# Patient Record
Sex: FEMALE | Race: WHITE | NOT HISPANIC OR LATINO | URBAN - METROPOLITAN AREA
[De-identification: names, ages, dates, MRNs, and addresses within clinical notes are randomized per-mention and may not be internally consistent; named-entity substitution may affect disease eponyms.]

---

## 2017-03-01 ENCOUNTER — EMERGENCY (EMERGENCY)
Facility: HOSPITAL | Age: 82
LOS: 1 days | End: 2017-03-01
Admitting: EMERGENCY MEDICINE
Payer: MEDICARE

## 2017-03-01 PROCEDURE — 72125 CT NECK SPINE W/O DYE: CPT | Mod: 26

## 2017-03-01 PROCEDURE — 12011 RPR F/E/E/N/L/M 2.5 CM/<: CPT

## 2017-03-01 PROCEDURE — 99284 EMERGENCY DEPT VISIT MOD MDM: CPT | Mod: 25

## 2017-03-01 PROCEDURE — 70450 CT HEAD/BRAIN W/O DYE: CPT | Mod: 26

## 2017-03-01 PROCEDURE — 96372 THER/PROPH/DIAG INJ SC/IM: CPT

## 2017-03-01 PROCEDURE — 72125 CT NECK SPINE W/O DYE: CPT

## 2017-03-01 PROCEDURE — 90471 IMMUNIZATION ADMIN: CPT

## 2017-03-01 PROCEDURE — 70450 CT HEAD/BRAIN W/O DYE: CPT

## 2017-03-01 PROCEDURE — 90715 TDAP VACCINE 7 YRS/> IM: CPT

## 2017-03-01 PROCEDURE — 70486 CT MAXILLOFACIAL W/O DYE: CPT

## 2017-03-01 PROCEDURE — 70486 CT MAXILLOFACIAL W/O DYE: CPT | Mod: 26

## 2017-06-25 ENCOUNTER — INPATIENT (INPATIENT)
Facility: HOSPITAL | Age: 82
LOS: 5 days | Discharge: HOME HEALTH | DRG: 641 | End: 2017-07-01
Attending: FAMILY MEDICINE | Admitting: FAMILY MEDICINE
Payer: MEDICARE

## 2017-06-25 VITALS
RESPIRATION RATE: 16 BRPM | WEIGHT: 134.92 LBS | HEART RATE: 72 BPM | HEIGHT: 62 IN | TEMPERATURE: 98 F | OXYGEN SATURATION: 94 % | SYSTOLIC BLOOD PRESSURE: 118 MMHG | DIASTOLIC BLOOD PRESSURE: 73 MMHG

## 2017-06-25 LAB
ALBUMIN SERPL ELPH-MCNC: 3 G/DL — LOW (ref 3.3–5)
ALP SERPL-CCNC: 96 U/L — SIGNIFICANT CHANGE UP (ref 40–120)
ALT FLD-CCNC: 19 U/L — SIGNIFICANT CHANGE UP (ref 12–78)
ANION GAP SERPL CALC-SCNC: 10 MMOL/L — SIGNIFICANT CHANGE UP (ref 5–17)
APTT BLD: 26.6 SEC — LOW (ref 27.5–37.4)
AST SERPL-CCNC: 28 U/L — SIGNIFICANT CHANGE UP (ref 15–37)
BASOPHILS # BLD AUTO: 0.1 K/UL — SIGNIFICANT CHANGE UP (ref 0–0.2)
BASOPHILS NFR BLD AUTO: 0.6 % — SIGNIFICANT CHANGE UP (ref 0–2)
BILIRUB SERPL-MCNC: 0.8 MG/DL — SIGNIFICANT CHANGE UP (ref 0.2–1.2)
BUN SERPL-MCNC: 10 MG/DL — SIGNIFICANT CHANGE UP (ref 7–23)
CALCIUM SERPL-MCNC: 8.2 MG/DL — LOW (ref 8.5–10.1)
CHLORIDE SERPL-SCNC: 83 MMOL/L — LOW (ref 96–108)
CK MB BLD-MCNC: 3.2 % — SIGNIFICANT CHANGE UP (ref 0–3.5)
CK MB CFR SERPL CALC: 9.1 NG/ML — HIGH (ref 0–3.6)
CK SERPL-CCNC: 284 U/L — HIGH (ref 26–192)
CO2 SERPL-SCNC: 28 MMOL/L — SIGNIFICANT CHANGE UP (ref 22–31)
CREAT SERPL-MCNC: 0.64 MG/DL — SIGNIFICANT CHANGE UP (ref 0.5–1.3)
EOSINOPHIL # BLD AUTO: 0.1 K/UL — SIGNIFICANT CHANGE UP (ref 0–0.5)
EOSINOPHIL NFR BLD AUTO: 0.5 % — SIGNIFICANT CHANGE UP (ref 0–6)
GLUCOSE SERPL-MCNC: 124 MG/DL — HIGH (ref 70–99)
HCT VFR BLD CALC: 36.5 % — SIGNIFICANT CHANGE UP (ref 34.5–45)
HGB BLD-MCNC: 12.8 G/DL — SIGNIFICANT CHANGE UP (ref 11.5–15.5)
INR BLD: 1.07 RATIO — SIGNIFICANT CHANGE UP (ref 0.88–1.16)
LYMPHOCYTES # BLD AUTO: 1.4 K/UL — SIGNIFICANT CHANGE UP (ref 1–3.3)
LYMPHOCYTES # BLD AUTO: 11.1 % — LOW (ref 13–44)
MCHC RBC-ENTMCNC: 32.3 PG — SIGNIFICANT CHANGE UP (ref 27–34)
MCHC RBC-ENTMCNC: 35.1 GM/DL — SIGNIFICANT CHANGE UP (ref 32–36)
MCV RBC AUTO: 92 FL — SIGNIFICANT CHANGE UP (ref 80–100)
MONOCYTES # BLD AUTO: 0.9 K/UL — SIGNIFICANT CHANGE UP (ref 0–0.9)
MONOCYTES NFR BLD AUTO: 6.9 % — SIGNIFICANT CHANGE UP (ref 1–9)
NEUTROPHILS # BLD AUTO: 10.5 K/UL — HIGH (ref 1.8–7.4)
NEUTROPHILS NFR BLD AUTO: 80.8 % — HIGH (ref 43–77)
PLATELET # BLD AUTO: 201 K/UL — SIGNIFICANT CHANGE UP (ref 150–400)
POTASSIUM SERPL-MCNC: 2.7 MMOL/L — CRITICAL LOW (ref 3.5–5.3)
POTASSIUM SERPL-SCNC: 2.7 MMOL/L — CRITICAL LOW (ref 3.5–5.3)
PROT SERPL-MCNC: 6.6 G/DL — SIGNIFICANT CHANGE UP (ref 6–8.3)
PROTHROM AB SERPL-ACNC: 11.7 SEC — SIGNIFICANT CHANGE UP (ref 9.8–12.7)
RBC # BLD: 3.96 M/UL — SIGNIFICANT CHANGE UP (ref 3.8–5.2)
RBC # FLD: 12 % — SIGNIFICANT CHANGE UP (ref 10.3–14.5)
SODIUM SERPL-SCNC: 121 MMOL/L — LOW (ref 135–145)
TROPONIN I SERPL-MCNC: 0.45 NG/ML — HIGH (ref 0.01–0.04)
WBC # BLD: 13 K/UL — HIGH (ref 3.8–10.5)
WBC # FLD AUTO: 13 K/UL — HIGH (ref 3.8–10.5)

## 2017-06-25 PROCEDURE — 93010 ELECTROCARDIOGRAM REPORT: CPT

## 2017-06-25 RX ORDER — CEFTRIAXONE 500 MG/1
1 INJECTION, POWDER, FOR SOLUTION INTRAMUSCULAR; INTRAVENOUS ONCE
Qty: 0 | Refills: 0 | Status: COMPLETED | OUTPATIENT
Start: 2017-06-25 | End: 2017-06-25

## 2017-06-25 RX ORDER — AMLODIPINE BESYLATE 2.5 MG/1
5 TABLET ORAL DAILY
Qty: 0 | Refills: 0 | Status: DISCONTINUED | OUTPATIENT
Start: 2017-06-25 | End: 2017-06-26

## 2017-06-25 RX ORDER — ACETAMINOPHEN 500 MG
650 TABLET ORAL EVERY 6 HOURS
Qty: 0 | Refills: 0 | Status: DISCONTINUED | OUTPATIENT
Start: 2017-06-25 | End: 2017-07-01

## 2017-06-25 RX ORDER — METOPROLOL TARTRATE 50 MG
25 TABLET ORAL DAILY
Qty: 0 | Refills: 0 | Status: DISCONTINUED | OUTPATIENT
Start: 2017-06-25 | End: 2017-07-01

## 2017-06-25 RX ORDER — LOSARTAN POTASSIUM 100 MG/1
25 TABLET, FILM COATED ORAL DAILY
Qty: 0 | Refills: 0 | Status: DISCONTINUED | OUTPATIENT
Start: 2017-06-25 | End: 2017-06-26

## 2017-06-25 RX ORDER — SENNA PLUS 8.6 MG/1
2 TABLET ORAL AT BEDTIME
Qty: 0 | Refills: 0 | Status: DISCONTINUED | OUTPATIENT
Start: 2017-06-25 | End: 2017-07-01

## 2017-06-25 RX ORDER — POTASSIUM CHLORIDE 20 MEQ
40 PACKET (EA) ORAL ONCE
Qty: 0 | Refills: 0 | Status: DISCONTINUED | OUTPATIENT
Start: 2017-06-25 | End: 2017-06-25

## 2017-06-25 RX ORDER — POTASSIUM CHLORIDE 20 MEQ
10 PACKET (EA) ORAL
Qty: 0 | Refills: 0 | Status: COMPLETED | OUTPATIENT
Start: 2017-06-25 | End: 2017-06-26

## 2017-06-25 RX ORDER — MIRTAZAPINE 45 MG/1
15 TABLET, ORALLY DISINTEGRATING ORAL AT BEDTIME
Qty: 0 | Refills: 0 | Status: DISCONTINUED | OUTPATIENT
Start: 2017-06-25 | End: 2017-07-01

## 2017-06-25 RX ORDER — SODIUM CHLORIDE 9 MG/ML
1000 INJECTION INTRAMUSCULAR; INTRAVENOUS; SUBCUTANEOUS
Qty: 0 | Refills: 0 | Status: DISCONTINUED | OUTPATIENT
Start: 2017-06-25 | End: 2017-06-26

## 2017-06-25 RX ORDER — AZITHROMYCIN 500 MG/1
500 TABLET, FILM COATED ORAL ONCE
Qty: 0 | Refills: 0 | Status: COMPLETED | OUTPATIENT
Start: 2017-06-25 | End: 2017-06-26

## 2017-06-25 RX ORDER — FOLIC ACID 0.8 MG
1 TABLET ORAL DAILY
Qty: 0 | Refills: 0 | Status: DISCONTINUED | OUTPATIENT
Start: 2017-06-25 | End: 2017-07-01

## 2017-06-25 RX ORDER — CEFTRIAXONE 500 MG/1
1 INJECTION, POWDER, FOR SOLUTION INTRAMUSCULAR; INTRAVENOUS EVERY 24 HOURS
Qty: 0 | Refills: 0 | Status: DISCONTINUED | OUTPATIENT
Start: 2017-06-26 | End: 2017-06-26

## 2017-06-25 RX ORDER — DIVALPROEX SODIUM 500 MG/1
125 TABLET, DELAYED RELEASE ORAL
Qty: 0 | Refills: 0 | Status: DISCONTINUED | OUTPATIENT
Start: 2017-06-25 | End: 2017-07-01

## 2017-06-25 RX ORDER — POTASSIUM CHLORIDE 20 MEQ
40 PACKET (EA) ORAL ONCE
Qty: 0 | Refills: 0 | Status: COMPLETED | OUTPATIENT
Start: 2017-06-25 | End: 2017-06-25

## 2017-06-25 RX ORDER — AZITHROMYCIN 500 MG/1
500 TABLET, FILM COATED ORAL EVERY 24 HOURS
Qty: 0 | Refills: 0 | Status: DISCONTINUED | OUTPATIENT
Start: 2017-06-26 | End: 2017-06-26

## 2017-06-25 RX ORDER — ENOXAPARIN SODIUM 100 MG/ML
40 INJECTION SUBCUTANEOUS DAILY
Qty: 0 | Refills: 0 | Status: DISCONTINUED | OUTPATIENT
Start: 2017-06-25 | End: 2017-07-01

## 2017-06-25 RX ORDER — LEVOTHYROXINE SODIUM 125 MCG
88 TABLET ORAL DAILY
Qty: 0 | Refills: 0 | Status: DISCONTINUED | OUTPATIENT
Start: 2017-06-25 | End: 2017-07-01

## 2017-06-25 RX ORDER — ENOXAPARIN SODIUM 100 MG/ML
60 INJECTION SUBCUTANEOUS ONCE
Qty: 0 | Refills: 0 | Status: DISCONTINUED | OUTPATIENT
Start: 2017-06-25 | End: 2017-06-27

## 2017-06-25 RX ORDER — ACETAMINOPHEN 500 MG
650 TABLET ORAL EVERY 6 HOURS
Qty: 0 | Refills: 0 | Status: DISCONTINUED | OUTPATIENT
Start: 2017-06-25 | End: 2017-06-28

## 2017-06-25 RX ORDER — CLOPIDOGREL BISULFATE 75 MG/1
75 TABLET, FILM COATED ORAL ONCE
Qty: 0 | Refills: 0 | Status: COMPLETED | OUTPATIENT
Start: 2017-06-25 | End: 2017-06-25

## 2017-06-25 RX ADMIN — CLOPIDOGREL BISULFATE 75 MILLIGRAM(S): 75 TABLET, FILM COATED ORAL at 23:21

## 2017-06-25 RX ADMIN — Medication 40 MILLIEQUIVALENT(S): at 23:53

## 2017-06-25 RX ADMIN — Medication 100 MILLIEQUIVALENT(S): at 23:21

## 2017-06-25 NOTE — ED PROVIDER NOTE - MEDICAL DECISION MAKING DETAILS
Pt is a 86 yo female sent from assisted living dementia unit for reported hemiplegia, resolved by time ems arrived. neuro exam wnl, check hct, ekg, labs, cxr, ce's.  neuro consult

## 2017-06-25 NOTE — ED ADULT NURSE NOTE - CHIEF COMPLAINT QUOTE
as per EMS facility member stated patient was found to have one sided weakness (unspecified what side) and that the weakness resolved. patients mental baseline is alert and oriented X2.   clear speech noted. patient is alert and oriented X2   as per EMS as per EMS facility member stated patient was found to have one sided weakness (unspecified what side) and that the weakness resolved. patients mental baseline is alert and oriented X2.   clear speech noted. patient is alert and oriented X2   as per EMS.  unknown start time of one sided weakness as per EMS.

## 2017-06-25 NOTE — ED PROVIDER NOTE - NS ED ROS FT
staff at assisted living called ems for hemiplegia, side unknown. pt states felt " faint"   denies any other compliant

## 2017-06-25 NOTE — ED ADULT NURSE REASSESSMENT NOTE - COMFORT CARE
side rails up/plan of care explained/warm blanket provided/assisted to bathroom/po fluids offered/repositioned

## 2017-06-25 NOTE — ED PROVIDER NOTE - CARE PLAN
Principal Discharge DX:	Transient cerebral ischemia, unspecified type  Secondary Diagnosis:	NSTEMI (non-ST elevation myocardial infarction)

## 2017-06-25 NOTE — ED ADULT TRIAGE NOTE - CHIEF COMPLAINT QUOTE
as per EMS facility member stated patient was found to have one sided weakness (unspecified what side) and that the weakness resolved. patients mental baseline is alert and oriented X2.   clear speech noted. patient is alert and oriented X2   as per EMS

## 2017-06-25 NOTE — ED ADULT NURSE NOTE - PMH
Anxiety disorder    Asthma    Constipation    Delusional disorder    Dementia    Hyperlipidemia    Hypertension

## 2017-06-25 NOTE — ED PROVIDER NOTE - OBJECTIVE STATEMENT
Pt is an 88 yo female with hx of dementia on dementia unit at [facility]. per ems, they were called for patient with hemiplegia. pt on their arrival had a [place] score of zero with no deficits. pt unable to recall what happened. pt glucose 138. pt denies any pain but states she felt "faint" earlier

## 2017-06-26 DIAGNOSIS — I10 ESSENTIAL (PRIMARY) HYPERTENSION: ICD-10-CM

## 2017-06-26 DIAGNOSIS — F22 DELUSIONAL DISORDERS: ICD-10-CM

## 2017-06-26 DIAGNOSIS — E03.9 HYPOTHYROIDISM, UNSPECIFIED: ICD-10-CM

## 2017-06-26 DIAGNOSIS — G45.9 TRANSIENT CEREBRAL ISCHEMIC ATTACK, UNSPECIFIED: ICD-10-CM

## 2017-06-26 DIAGNOSIS — Z71.89 OTHER SPECIFIED COUNSELING: ICD-10-CM

## 2017-06-26 DIAGNOSIS — E87.1 HYPO-OSMOLALITY AND HYPONATREMIA: ICD-10-CM

## 2017-06-26 DIAGNOSIS — I21.4 NON-ST ELEVATION (NSTEMI) MYOCARDIAL INFARCTION: ICD-10-CM

## 2017-06-26 LAB
ANION GAP SERPL CALC-SCNC: 9 MMOL/L — SIGNIFICANT CHANGE UP (ref 5–17)
BUN SERPL-MCNC: 7 MG/DL — SIGNIFICANT CHANGE UP (ref 7–23)
CALCIUM SERPL-MCNC: 8.6 MG/DL — SIGNIFICANT CHANGE UP (ref 8.5–10.1)
CHLORIDE SERPL-SCNC: 87 MMOL/L — LOW (ref 96–108)
CHOLEST SERPL-MCNC: 161 MG/DL — SIGNIFICANT CHANGE UP (ref 10–199)
CK SERPL-CCNC: 540 U/L — HIGH (ref 26–192)
CK SERPL-CCNC: 741 U/L — HIGH (ref 26–192)
CK SERPL-CCNC: 897 U/L — HIGH (ref 26–192)
CO2 SERPL-SCNC: 28 MMOL/L — SIGNIFICANT CHANGE UP (ref 22–31)
CREAT SERPL-MCNC: 0.5 MG/DL — SIGNIFICANT CHANGE UP (ref 0.5–1.3)
GLUCOSE SERPL-MCNC: 94 MG/DL — SIGNIFICANT CHANGE UP (ref 70–99)
HCT VFR BLD CALC: 39.1 % — SIGNIFICANT CHANGE UP (ref 34.5–45)
HDLC SERPL-MCNC: 83 MG/DL — SIGNIFICANT CHANGE UP (ref 40–125)
HGB BLD-MCNC: 14.1 G/DL — SIGNIFICANT CHANGE UP (ref 11.5–15.5)
LACTATE SERPL-SCNC: 2 MMOL/L — SIGNIFICANT CHANGE UP (ref 0.7–2)
LIPID PNL WITH DIRECT LDL SERPL: 61 MG/DL — SIGNIFICANT CHANGE UP
MAGNESIUM SERPL-MCNC: 1.8 MG/DL — SIGNIFICANT CHANGE UP (ref 1.6–2.6)
MCHC RBC-ENTMCNC: 33.1 PG — SIGNIFICANT CHANGE UP (ref 27–34)
MCHC RBC-ENTMCNC: 36 GM/DL — SIGNIFICANT CHANGE UP (ref 32–36)
MCV RBC AUTO: 91.9 FL — SIGNIFICANT CHANGE UP (ref 80–100)
PLATELET # BLD AUTO: 233 K/UL — SIGNIFICANT CHANGE UP (ref 150–400)
POTASSIUM SERPL-MCNC: 3.3 MMOL/L — LOW (ref 3.5–5.3)
POTASSIUM SERPL-SCNC: 3.3 MMOL/L — LOW (ref 3.5–5.3)
PROCALCITONIN SERPL-MCNC: 0.08 NG/ML — HIGH (ref 0–0.04)
RBC # BLD: 4.25 M/UL — SIGNIFICANT CHANGE UP (ref 3.8–5.2)
RBC # FLD: 11.8 % — SIGNIFICANT CHANGE UP (ref 10.3–14.5)
SODIUM SERPL-SCNC: 124 MMOL/L — LOW (ref 135–145)
T3 SERPL-MCNC: 69 NG/DL — LOW (ref 80–200)
T4 AB SER-ACNC: 8.5 UG/DL — SIGNIFICANT CHANGE UP (ref 4.6–12)
TOTAL CHOLESTEROL/HDL RATIO MEASUREMENT: 1.9 RATIO — LOW (ref 3.3–7.1)
TRIGL SERPL-MCNC: 85 MG/DL — SIGNIFICANT CHANGE UP (ref 10–149)
TROPONIN I SERPL-MCNC: 0.8 NG/ML — HIGH (ref 0.01–0.04)
TROPONIN I SERPL-MCNC: 1.2 NG/ML — HIGH (ref 0.01–0.04)
TROPONIN I SERPL-MCNC: 2.02 NG/ML — HIGH (ref 0.01–0.04)
TSH SERPL-MCNC: 0.08 UIU/ML — LOW (ref 0.36–3.74)
WBC # BLD: 10.8 K/UL — HIGH (ref 3.8–10.5)
WBC # FLD AUTO: 10.8 K/UL — HIGH (ref 3.8–10.5)

## 2017-06-26 PROCEDURE — 99223 1ST HOSP IP/OBS HIGH 75: CPT

## 2017-06-26 PROCEDURE — 93306 TTE W/DOPPLER COMPLETE: CPT | Mod: 26

## 2017-06-26 RX ORDER — SODIUM CHLORIDE 9 MG/ML
2 INJECTION INTRAMUSCULAR; INTRAVENOUS; SUBCUTANEOUS EVERY 6 HOURS
Qty: 0 | Refills: 0 | Status: COMPLETED | OUTPATIENT
Start: 2017-06-26 | End: 2017-06-26

## 2017-06-26 RX ORDER — POTASSIUM CHLORIDE 20 MEQ
40 PACKET (EA) ORAL EVERY 6 HOURS
Qty: 0 | Refills: 0 | Status: COMPLETED | OUTPATIENT
Start: 2017-06-26 | End: 2017-06-26

## 2017-06-26 RX ORDER — CLOPIDOGREL BISULFATE 75 MG/1
75 TABLET, FILM COATED ORAL DAILY
Qty: 0 | Refills: 0 | Status: DISCONTINUED | OUTPATIENT
Start: 2017-06-26 | End: 2017-06-28

## 2017-06-26 RX ORDER — AMLODIPINE BESYLATE 2.5 MG/1
5 TABLET ORAL DAILY
Qty: 0 | Refills: 0 | Status: DISCONTINUED | OUTPATIENT
Start: 2017-06-27 | End: 2017-06-30

## 2017-06-26 RX ADMIN — SODIUM CHLORIDE 2 GRAM(S): 9 INJECTION INTRAMUSCULAR; INTRAVENOUS; SUBCUTANEOUS at 17:45

## 2017-06-26 RX ADMIN — CLOPIDOGREL BISULFATE 75 MILLIGRAM(S): 75 TABLET, FILM COATED ORAL at 13:02

## 2017-06-26 RX ADMIN — Medication 40 MILLIEQUIVALENT(S): at 17:45

## 2017-06-26 RX ADMIN — CEFTRIAXONE 100 GRAM(S): 500 INJECTION, POWDER, FOR SOLUTION INTRAMUSCULAR; INTRAVENOUS at 00:36

## 2017-06-26 RX ADMIN — Medication 88 MICROGRAM(S): at 06:26

## 2017-06-26 RX ADMIN — SODIUM CHLORIDE 2 GRAM(S): 9 INJECTION INTRAMUSCULAR; INTRAVENOUS; SUBCUTANEOUS at 13:02

## 2017-06-26 RX ADMIN — AMLODIPINE BESYLATE 5 MILLIGRAM(S): 2.5 TABLET ORAL at 06:27

## 2017-06-26 RX ADMIN — Medication 1 MILLIGRAM(S): at 13:02

## 2017-06-26 RX ADMIN — ENOXAPARIN SODIUM 40 MILLIGRAM(S): 100 INJECTION SUBCUTANEOUS at 13:02

## 2017-06-26 RX ADMIN — DIVALPROEX SODIUM 125 MILLIGRAM(S): 500 TABLET, DELAYED RELEASE ORAL at 06:26

## 2017-06-26 RX ADMIN — CEFTRIAXONE 100 GRAM(S): 500 INJECTION, POWDER, FOR SOLUTION INTRAMUSCULAR; INTRAVENOUS at 22:05

## 2017-06-26 RX ADMIN — DIVALPROEX SODIUM 125 MILLIGRAM(S): 500 TABLET, DELAYED RELEASE ORAL at 17:45

## 2017-06-26 RX ADMIN — SENNA PLUS 2 TABLET(S): 8.6 TABLET ORAL at 22:05

## 2017-06-26 RX ADMIN — Medication 100 MILLIEQUIVALENT(S): at 08:27

## 2017-06-26 RX ADMIN — Medication 1 MILLIGRAM(S): at 03:50

## 2017-06-26 RX ADMIN — Medication 40 MILLIEQUIVALENT(S): at 13:02

## 2017-06-26 RX ADMIN — MIRTAZAPINE 15 MILLIGRAM(S): 45 TABLET, ORALLY DISINTEGRATING ORAL at 22:05

## 2017-06-26 RX ADMIN — AZITHROMYCIN 255 MILLIGRAM(S): 500 TABLET, FILM COATED ORAL at 02:59

## 2017-06-26 RX ADMIN — LOSARTAN POTASSIUM 25 MILLIGRAM(S): 100 TABLET, FILM COATED ORAL at 06:26

## 2017-06-26 RX ADMIN — AZITHROMYCIN 255 MILLIGRAM(S): 500 TABLET, FILM COATED ORAL at 22:05

## 2017-06-26 RX ADMIN — Medication 100 MILLIEQUIVALENT(S): at 05:08

## 2017-06-26 NOTE — DISCHARGE NOTE ADULT - CARE PROVIDER_API CALL
randy perea  Phone: (   )    -  Fax: (   )    -    Tomas Damian), Cardiology Cardiology  [facility]  [address] [place] [address]  Phone: [phone]  Fax: [phone]    Jeffrey Matthews), Urology  [address]  [place] [address]  Phone: [phone]  Fax: [phone]    Juan Lopez), Medicine  [address]  [place] [address]  Phone: [phone]  Fax: [phone]

## 2017-06-26 NOTE — H&P ADULT - PROBLEM SELECTOR PLAN 1
- Admit to tele  - plavix  - neuro check q4h  - carotid u/s  - 2decho  - lipid profile  - physical therapy  - neuro consult

## 2017-06-26 NOTE — DISCHARGE NOTE ADULT - CARE PROVIDERS DIRECT ADDRESSES
,DirectAddress_Unknown,DirectAddress_Unknown,[email or web address],DirectAddress_Unknown

## 2017-06-26 NOTE — DISCHARGE NOTE ADULT - SECONDARY DIAGNOSIS.
Anxiety disorder Essential hypertension Hyperlipidemia Hypertension Hyponatremia Hypothyroidism, unspecified type

## 2017-06-26 NOTE — DISCHARGE NOTE ADULT - NS AS DC STROKE ED MATERIALS
Prescribed Medications/Stroke Education Booklet/Stroke Warning Signs and Symptoms/Call 911 for Stroke/Need for Followup After Discharge/High Blood pressure is a risk factor for Stroke/Risk Factors for Stroke High Blood pressure is a risk factor for Stroke

## 2017-06-26 NOTE — DISCHARGE NOTE ADULT - CARE PLAN
Principal Discharge DX:	Syncope  Goal:	free from falling  Instructions for follow-up, activity and diet:	follow up with PMD Dr. GARCIA  Secondary Diagnosis:	Anxiety disorder  Secondary Diagnosis:	Essential hypertension  Secondary Diagnosis:	Hyperlipidemia  Secondary Diagnosis:	Hypertension  Secondary Diagnosis:	Hyponatremia  Secondary Diagnosis:	Hypothyroidism, unspecified type

## 2017-06-26 NOTE — DISCHARGE NOTE ADULT - MEDICATION SUMMARY - MEDICATIONS TO TAKE
I will START or STAY ON the medications listed below when I get home from the hospital:    losartan 100 mg oral tablet  -- 1 tab(s) by mouth once a day hold for SBP<110  -- with potassium   -- Indication: For Essential hypertension    magnesium hydroxide 8% oral suspension  -- 30 milliliter(s) by mouth once a day, As needed, Constipation  -- Indication: For Constipation    tamsulosin 0.4 mg oral capsule  -- 1 cap(s) by mouth once a day (at bedtime)  -- Indication: For Retention, urine    LORazepam 0.5 mg oral tablet  -- 0.5 tab(s) by mouth 3 times a day, As Needed  -- as needed for anxiety   -- Indication: For Anxiety disorder    Depakote Sprinkles 125 mg oral delayed release capsule  --  by mouth 2 times a day  -- Indication: For Delusional disorder    mirtazapine 15 mg oral tablet  -- 1 tab(s) by mouth once a day (at bedtime)  -- Indication: For Depression    Metoprolol Succinate ER 25 mg oral tablet, extended release  --  by mouth 2 times a day hold for SBP<110 HR<60  -- Indication: For Essential hypertension    Norvasc 5 mg oral tablet  -- 1 tab(s) by mouth once a day  hold for SBP<110  -- Indication: For Essential hypertension    senna 8.6 mg oral tablet  -- 2 tab(s) by mouth once a day (at bedtime)  -- Indication: For Constipation    docusate sodium 100 mg oral capsule  -- 1 cap(s) by mouth once a day  -- Indication: For Constipation    levothyroxine 88 mcg (0.088 mg) oral capsule  -- 1 cap(s) by mouth once a day  -- Indication: For Hypothyroidism, unspecified type    folic acid 1 mg oral tablet  -- 1 tab(s) by mouth once a day  -- Indication: For suplement I will START or STAY ON the medications listed below when I get home from the hospital:    losartan 100 mg oral tablet  -- 1 tab(s) by mouth once a day hold for SBP<110  -- with potassium   -- Indication: For Essential hypertension    magnesium hydroxide 8% oral suspension  -- 30 milliliter(s) by mouth once a day, As needed, Constipation  -- Indication: For Constipation    LORazepam 0.5 mg oral tablet  -- 0.5 tab(s) by mouth 3 times a day, As Needed  -- as needed for anxiety   -- Indication: For Anxiety disorder    Depakote Sprinkles 125 mg oral delayed release capsule  --  by mouth 2 times a day  -- Indication: For Delusional disorder    mirtazapine 15 mg oral tablet  -- 1 tab(s) by mouth once a day (at bedtime)  -- Indication: For Depression    Metoprolol Succinate ER 25 mg oral tablet, extended release  --  by mouth 2 times a day hold for SBP<110 HR<60  -- Indication: For Essential hypertension    senna 8.6 mg oral tablet  -- 2 tab(s) by mouth once a day (at bedtime)  -- Indication: For Constipation    docusate sodium 100 mg oral capsule  -- 1 cap(s) by mouth once a day  -- Indication: For Constipation    levothyroxine 88 mcg (0.088 mg) oral capsule  -- 1 cap(s) by mouth once a day  -- Indication: For Hypothyroidism, unspecified type    folic acid 1 mg oral tablet  -- 1 tab(s) by mouth once a day  -- Indication: For suplement I will START or STAY ON the medications listed below when I get home from the hospital:    losartan 100 mg oral tablet  -- 1 tab(s) by mouth once a day hold for SBP<110  -- with potassium   -- Indication: For Essential hypertension    magnesium hydroxide 8% oral suspension  -- 30 milliliter(s) by mouth once a day, As needed, Constipation  -- Indication: For Constipation    tamsulosin 0.4 mg oral capsule  -- 2 cap(s) by mouth once a day (at bedtime)  -- Indication: For Retention, urine    LORazepam 0.5 mg oral tablet  -- 0.5 tab(s) by mouth 3 times a day, As Needed  -- as needed for anxiety   -- Indication: For Anxiety disorder    Depakote Sprinkles 125 mg oral delayed release capsule  --  by mouth 2 times a day  -- Indication: For Delusional disorder    mirtazapine 15 mg oral tablet  -- 1 tab(s) by mouth once a day (at bedtime)  -- Indication: For Depression    Metoprolol Succinate ER 25 mg oral tablet, extended release  --  by mouth 2 times a day hold for SBP<110 HR<60  -- Indication: For Essential hypertension    amLODIPine 2.5 mg oral tablet  -- 1 tab(s) by mouth once a day  -- Indication: For Essential hypertension    senna 8.6 mg oral tablet  -- 2 tab(s) by mouth once a day (at bedtime)  -- Indication: For Constipation    docusate sodium 100 mg oral capsule  -- 1 cap(s) by mouth once a day  -- Indication: For Constipation    levothyroxine 88 mcg (0.088 mg) oral capsule  -- 1 cap(s) by mouth once a day  -- Indication: For Hypothyroidism, unspecified type    folic acid 1 mg oral tablet  -- 1 tab(s) by mouth once a day  -- Indication: For suplement

## 2017-06-26 NOTE — DISCHARGE NOTE ADULT - PROVIDER TOKENS
FREE:[LAST:[brit],FIRST:[randy],PHONE:[(   )    -],FAX:[(   )    -],ADDRESS:[[place]]],TOKEN:'[id number]:'[id number]:'[id number]'

## 2017-06-26 NOTE — DISCHARGE NOTE ADULT - MEDICATION SUMMARY - MEDICATIONS TO CHANGE
I will SWITCH the dose or number of times a day I take the medications listed below when I get home from the hospital:  None I will SWITCH the dose or number of times a day I take the medications listed below when I get home from the hospital:    Norvasc 5 mg oral tablet  -- 1 tab(s) by mouth once a day

## 2017-06-26 NOTE — H&P ADULT - HISTORY OF PRESENT ILLNESS
This is an 88 yo female with hx of dementia on dementia unit at [facility], HTN, HLD per ems, they were called for patient with hemiplegia. pt on their arrival had a c[place] score of zero with no deficits. pt unable to recall what happened. pt glucose 138. pt denies any pain but states she felt "faint" earlier This is an 86 yo female with hx of dementia on dementia unit at [facility], HTN, HLD per ems, they were called for patient with left hemiplegia. Pt on their arrival had a c[place] score of zero with no deficits. Pt unable to recall what happened. Pt glucose 138. Pt denies any pain but states she felt "faint" earlier. She denies CP, n/v/d, palpitations, SOB, cough, fever.

## 2017-06-26 NOTE — H&P ADULT - ASSESSMENT
This is an 88 F comes after passing out This is an 88 F comes after passing out with possible left sided weakness

## 2017-06-26 NOTE — CONSULT NOTE ADULT - ASSESSMENT
this is an 87-year-old white female with a known history of hypertension hyperlipidemia. According to the daughter, and she was told that she might have had a small heart attack many years ago. The daughter is not sure about her workup however no intervention was required. She does not complain of any chest pain, or shortness of breath. It is unclear if this event was due to a mechanical fall or an episode of lightheadedness, possibly due to hypotension. There were no symptoms that would be  consistent with acute cardiac syndrome. Her CK is rising from 284 to 897 consistent with her fall. Troponin is 0.445 and 2.0, which may be due to cardiac strain. in the absence of a good history we must rule out acute cardiac syndrome.    we will trend her cardiac enzymes and get MB on the elevated CK. We'll get an echocardiogram both to check on a murmur that sounds like aortic  valve disease, as well as her ejection fraction. In the meantime, she'll be started on low-dose aspirin will be continued on her metoprolol, amlodipine, and losartan. In view of her hyponatremia her diuretic will be held. also reduce her dose of hypertensive medications since she is relatively hypotensive in the hospital.    This was discussed with the daughter. this is an 87-year-old white female with a known history of hypertension hyperlipidemia. According to the daughter, and she was told that she might have had a small heart attack many years ago. The daughter is not sure about her workup however no intervention was required. She does not complain of any chest pain, or shortness of breath. It is unclear if this event was due to a mechanical fall or an episode of lightheadedness, possibly due to hypotension. There were no symptoms that would be  consistent with acute cardiac syndrome. Her CK is rising from 284 to 897 consistent with her fall. Troponin is 0.445 and 2.0, which may be due to cardiac strain. in the absence of a good history we must rule out acute cardiac syndrome.    we will trend her cardiac enzymes and get MB on the elevated CK. We'll get an echocardiogram both to check on a murmur that sounds like aortic  valve disease, as well as her ejection fraction. In the meantime, she'll be started on plavix and will be continued on her metoprolol, amlodipine, and losartan. In view of her hyponatremia her diuretic will be held. also reduce her dose of hypertensive medications since she is relatively hypotensive in the hospital.    This was discussed with the daughter.

## 2017-06-26 NOTE — DISCHARGE NOTE ADULT - HOSPITAL COURSE
admitted for syncope  neurocardiac work up negative  found to have hyponatremia - HCTZ discontinued  went into retention of urine - staight cath and borrero per Urology  periods of agitation - pulled IV and borrero  DC after urology and nephrology clearance

## 2017-06-26 NOTE — DISCHARGE NOTE ADULT - ADDITIONAL INSTRUCTIONS
follow up with heart Dr. SEN follow up with heart Dr. SEN  intermitent urinary catheterization as needed

## 2017-06-26 NOTE — DISCHARGE NOTE ADULT - PATIENT PORTAL LINK FT
“You can access the Follow[facility] Patient Portal, offered by [facility], by registering with the following website: [email or web address]”

## 2017-06-26 NOTE — INPATIENT CERTIFICATION FOR MEDICARE PATIENTS - RISKS OF ADVERSE EVENTS
Concern for delay in diagnosis and treatment/Concern for neurologic deterioration/Concern for cardiopulmonary deterioration

## 2017-06-26 NOTE — CONSULT NOTE ADULT - ASSESSMENT
·	Hyponatremia: HCTZ rx, ? SIADH  ·	hypokalemia  ·	Dementia  ·	MI  ·	? TIA    Maintain PO fluid restriction. Check urine sodium, urine osm and serum uric acid level. Avoid hypotonic fluids. Monitor BP closely. Low TSH. Endocrine evaluation. Check serial sodium levels. Salt tablets if BP acceptable. Pt advised on compliance with PO fluid restriction. Potassium supplements. Will follow electrolytes and renal function trend. Cardiology evaluation. Further recommendations pending clinical course. Thank you for the courtesy of this referral.

## 2017-06-27 LAB
-  CANDIDA ALBICANS: SIGNIFICANT CHANGE UP
-  CANDIDA GLABRATA: SIGNIFICANT CHANGE UP
-  CANDIDA KRUSEI: SIGNIFICANT CHANGE UP
-  CANDIDA PARAPSILOSIS: SIGNIFICANT CHANGE UP
-  CANDIDA TROPICALIS: SIGNIFICANT CHANGE UP
-  COAGULASE NEGATIVE STAPHYLOCOCCUS: SIGNIFICANT CHANGE UP
-  K. PNEUMONIAE GROUP: SIGNIFICANT CHANGE UP
-  KPC RESISTANCE GENE: SIGNIFICANT CHANGE UP
-  STREPTOCOCCUS SP. (NOT GRP A, B OR S PNEUMONIAE): SIGNIFICANT CHANGE UP
A BAUMANNII DNA SPEC QL NAA+PROBE: SIGNIFICANT CHANGE UP
ANION GAP SERPL CALC-SCNC: 8 MMOL/L — SIGNIFICANT CHANGE UP (ref 5–17)
BUN SERPL-MCNC: 8 MG/DL — SIGNIFICANT CHANGE UP (ref 7–23)
CALCIUM SERPL-MCNC: 8.4 MG/DL — LOW (ref 8.5–10.1)
CHLORIDE SERPL-SCNC: 89 MMOL/L — LOW (ref 96–108)
CO2 SERPL-SCNC: 27 MMOL/L — SIGNIFICANT CHANGE UP (ref 22–31)
CREAT SERPL-MCNC: 0.78 MG/DL — SIGNIFICANT CHANGE UP (ref 0.5–1.3)
E CLOAC COMP DNA BLD POS QL NAA+PROBE: SIGNIFICANT CHANGE UP
E COLI DNA BLD POS QL NAA+NON-PROBE: SIGNIFICANT CHANGE UP
ENTEROCOC DNA BLD POS QL NAA+NON-PROBE: SIGNIFICANT CHANGE UP
ENTEROCOC DNA BLD POS QL NAA+NON-PROBE: SIGNIFICANT CHANGE UP
GLUCOSE SERPL-MCNC: 143 MG/DL — HIGH (ref 70–99)
GP B STREP DNA BLD POS QL NAA+NON-PROBE: SIGNIFICANT CHANGE UP
GRAM STN FLD: SIGNIFICANT CHANGE UP
HAEM INFLU DNA BLD POS QL NAA+NON-PROBE: SIGNIFICANT CHANGE UP
HBA1C BLD-MCNC: 5.9 % — HIGH (ref 4–5.6)
K OXYTOCA DNA BLD POS QL NAA+NON-PROBE: SIGNIFICANT CHANGE UP
L MONOCYTOG DNA BLD POS QL NAA+NON-PROBE: SIGNIFICANT CHANGE UP
METHOD TYPE: SIGNIFICANT CHANGE UP
MRSA SPEC QL CULT: SIGNIFICANT CHANGE UP
MSSA DNA SPEC QL NAA+PROBE: SIGNIFICANT CHANGE UP
N MEN ISLT CULT: SIGNIFICANT CHANGE UP
OSMOLALITY UR: 310 MOS/KG — SIGNIFICANT CHANGE UP (ref 50–1200)
P AERUGINOSA DNA BLD POS NAA+NON-PROBE: SIGNIFICANT CHANGE UP
POTASSIUM SERPL-MCNC: 4.1 MMOL/L — SIGNIFICANT CHANGE UP (ref 3.5–5.3)
POTASSIUM SERPL-SCNC: 4.1 MMOL/L — SIGNIFICANT CHANGE UP (ref 3.5–5.3)
PROTEUS SP DNA BLD POS QL NAA+NON-PROBE: SIGNIFICANT CHANGE UP
S MARCESCENS DNA BLD POS NAA+NON-PROBE: SIGNIFICANT CHANGE UP
S PNEUM DNA BLD POS QL NAA+NON-PROBE: SIGNIFICANT CHANGE UP
S PYO DNA BLD POS QL NAA+NON-PROBE: SIGNIFICANT CHANGE UP
SODIUM SERPL-SCNC: 124 MMOL/L — LOW (ref 135–145)
SODIUM UR-SCNC: 30 MMOL/L — SIGNIFICANT CHANGE UP

## 2017-06-27 PROCEDURE — 99232 SBSQ HOSP IP/OBS MODERATE 35: CPT

## 2017-06-27 RX ORDER — VANCOMYCIN HCL 1 G
1000 VIAL (EA) INTRAVENOUS ONCE
Qty: 0 | Refills: 0 | Status: COMPLETED | OUTPATIENT
Start: 2017-06-27 | End: 2017-06-27

## 2017-06-27 RX ORDER — HALOPERIDOL DECANOATE 100 MG/ML
1 INJECTION INTRAMUSCULAR EVERY 6 HOURS
Qty: 0 | Refills: 0 | Status: DISCONTINUED | OUTPATIENT
Start: 2017-06-27 | End: 2017-06-28

## 2017-06-27 RX ADMIN — Medication 1 MILLIGRAM(S): at 12:33

## 2017-06-27 RX ADMIN — SENNA PLUS 2 TABLET(S): 8.6 TABLET ORAL at 21:42

## 2017-06-27 RX ADMIN — Medication 25 MILLIGRAM(S): at 16:02

## 2017-06-27 RX ADMIN — DIVALPROEX SODIUM 125 MILLIGRAM(S): 500 TABLET, DELAYED RELEASE ORAL at 16:52

## 2017-06-27 RX ADMIN — Medication 0.5 MILLIGRAM(S): at 16:52

## 2017-06-27 RX ADMIN — MIRTAZAPINE 15 MILLIGRAM(S): 45 TABLET, ORALLY DISINTEGRATING ORAL at 21:42

## 2017-06-27 RX ADMIN — HALOPERIDOL DECANOATE 1 MILLIGRAM(S): 100 INJECTION INTRAMUSCULAR at 18:01

## 2017-06-27 RX ADMIN — CLOPIDOGREL BISULFATE 75 MILLIGRAM(S): 75 TABLET, FILM COATED ORAL at 12:33

## 2017-06-27 RX ADMIN — AMLODIPINE BESYLATE 5 MILLIGRAM(S): 2.5 TABLET ORAL at 16:02

## 2017-06-27 RX ADMIN — ENOXAPARIN SODIUM 40 MILLIGRAM(S): 100 INJECTION SUBCUTANEOUS at 12:33

## 2017-06-27 RX ADMIN — Medication 250 MILLIGRAM(S): at 16:52

## 2017-06-27 NOTE — PROGRESS NOTE ADULT - ASSESSMENT
Sanaz is hemodynamically stable in sinus rhythm with no evidence of heart failure. Her elevated troponin level does not likely represent primary atherothrombosis although she may have had a component of demand ischemia. Conservative therapy is warranted. She has normal left ventricular function by echo with no significant valvular disease    Recommend     telemetry can be D/C'd  Continue aspirin but discontinue clopidogrel  Antibiotics per medicine  Continue amlodipine for hypertension  Continue metoprolol  We will follow closely with you

## 2017-06-27 NOTE — PROGRESS NOTE ADULT - ASSESSMENT
87 white female admitted with a syncopal episode.   HCTZ induced hyponatremia.   advised to stay on 1000 cc fluid restriction.  avoid HCTZ on discharge.  will also check magnesium in am.

## 2017-06-27 NOTE — PHYSICAL THERAPY INITIAL EVALUATION ADULT - PERTINENT HX OF CURRENT PROBLEM, REHAB EVAL
This is an 86 yo female with hx of dementia on dementia unit at [place], HTN, HLD per ems, they were called for patient with left hemiplegia. Pt on their arrival had a c[place] score of zero with no deficits.

## 2017-06-28 DIAGNOSIS — R41.0 DISORIENTATION, UNSPECIFIED: ICD-10-CM

## 2017-06-28 LAB
ALBUMIN SERPL ELPH-MCNC: 3.1 G/DL — LOW (ref 3.3–5)
ALP SERPL-CCNC: 99 U/L — SIGNIFICANT CHANGE UP (ref 40–120)
ALT FLD-CCNC: 28 U/L — SIGNIFICANT CHANGE UP (ref 12–78)
ANION GAP SERPL CALC-SCNC: 8 MMOL/L — SIGNIFICANT CHANGE UP (ref 5–17)
AST SERPL-CCNC: 37 U/L — SIGNIFICANT CHANGE UP (ref 15–37)
BILIRUB SERPL-MCNC: 0.8 MG/DL — SIGNIFICANT CHANGE UP (ref 0.2–1.2)
BUN SERPL-MCNC: 7 MG/DL — SIGNIFICANT CHANGE UP (ref 7–23)
CALCIUM SERPL-MCNC: 8.6 MG/DL — SIGNIFICANT CHANGE UP (ref 8.5–10.1)
CHLORIDE SERPL-SCNC: 90 MMOL/L — LOW (ref 96–108)
CO2 SERPL-SCNC: 29 MMOL/L — SIGNIFICANT CHANGE UP (ref 22–31)
CREAT SERPL-MCNC: 0.5 MG/DL — SIGNIFICANT CHANGE UP (ref 0.5–1.3)
CULTURE RESULTS: SIGNIFICANT CHANGE UP
CULTURE RESULTS: SIGNIFICANT CHANGE UP
GLUCOSE SERPL-MCNC: 104 MG/DL — HIGH (ref 70–99)
GRAM STN FLD: SIGNIFICANT CHANGE UP
GRAM STN FLD: SIGNIFICANT CHANGE UP
HCT VFR BLD CALC: 43.1 % — SIGNIFICANT CHANGE UP (ref 34.5–45)
HGB BLD-MCNC: 15.3 G/DL — SIGNIFICANT CHANGE UP (ref 11.5–15.5)
MAGNESIUM SERPL-MCNC: 1.9 MG/DL — SIGNIFICANT CHANGE UP (ref 1.6–2.6)
MCHC RBC-ENTMCNC: 32.9 PG — SIGNIFICANT CHANGE UP (ref 27–34)
MCHC RBC-ENTMCNC: 35.5 GM/DL — SIGNIFICANT CHANGE UP (ref 32–36)
MCV RBC AUTO: 92.7 FL — SIGNIFICANT CHANGE UP (ref 80–100)
ORGANISM # SPEC MICROSCOPIC CNT: SIGNIFICANT CHANGE UP
ORGANISM # SPEC MICROSCOPIC CNT: SIGNIFICANT CHANGE UP
PLATELET # BLD AUTO: 276 K/UL — SIGNIFICANT CHANGE UP (ref 150–400)
POTASSIUM SERPL-MCNC: 3.1 MMOL/L — LOW (ref 3.5–5.3)
POTASSIUM SERPL-SCNC: 3.1 MMOL/L — LOW (ref 3.5–5.3)
PROT SERPL-MCNC: 7 G/DL — SIGNIFICANT CHANGE UP (ref 6–8.3)
RBC # BLD: 4.65 M/UL — SIGNIFICANT CHANGE UP (ref 3.8–5.2)
RBC # FLD: 12.2 % — SIGNIFICANT CHANGE UP (ref 10.3–14.5)
SODIUM SERPL-SCNC: 127 MMOL/L — LOW (ref 135–145)
SPECIMEN SOURCE: SIGNIFICANT CHANGE UP
T3FREE SERPL-MCNC: 1.79 PG/ML — LOW (ref 1.8–4.6)
T4 FREE SERPL-MCNC: 1.5 NG/DL — SIGNIFICANT CHANGE UP (ref 0.9–1.8)
TSH SERPL-MCNC: 0.35 UIU/ML — LOW (ref 0.36–3.74)
WBC # BLD: 9.6 K/UL — SIGNIFICANT CHANGE UP (ref 3.8–10.5)
WBC # FLD AUTO: 9.6 K/UL — SIGNIFICANT CHANGE UP (ref 3.8–10.5)

## 2017-06-28 PROCEDURE — 99232 SBSQ HOSP IP/OBS MODERATE 35: CPT

## 2017-06-28 RX ORDER — SODIUM CHLORIDE 9 MG/ML
2 INJECTION INTRAMUSCULAR; INTRAVENOUS; SUBCUTANEOUS EVERY 6 HOURS
Qty: 0 | Refills: 0 | Status: COMPLETED | OUTPATIENT
Start: 2017-06-28 | End: 2017-06-28

## 2017-06-28 RX ORDER — POTASSIUM CHLORIDE 20 MEQ
40 PACKET (EA) ORAL EVERY 6 HOURS
Qty: 0 | Refills: 0 | Status: COMPLETED | OUTPATIENT
Start: 2017-06-28 | End: 2017-06-28

## 2017-06-28 RX ORDER — CLOPIDOGREL BISULFATE 75 MG/1
75 TABLET, FILM COATED ORAL DAILY
Qty: 0 | Refills: 0 | Status: DISCONTINUED | OUTPATIENT
Start: 2017-06-28 | End: 2017-07-01

## 2017-06-28 RX ORDER — ASPIRIN/CALCIUM CARB/MAGNESIUM 324 MG
81 TABLET ORAL DAILY
Qty: 0 | Refills: 0 | Status: DISCONTINUED | OUTPATIENT
Start: 2017-06-28 | End: 2017-06-28

## 2017-06-28 RX ADMIN — Medication 40 MILLIEQUIVALENT(S): at 23:30

## 2017-06-28 RX ADMIN — DIVALPROEX SODIUM 125 MILLIGRAM(S): 500 TABLET, DELAYED RELEASE ORAL at 05:17

## 2017-06-28 RX ADMIN — Medication 1 MILLIGRAM(S): at 11:59

## 2017-06-28 RX ADMIN — SODIUM CHLORIDE 2 GRAM(S): 9 INJECTION INTRAMUSCULAR; INTRAVENOUS; SUBCUTANEOUS at 17:31

## 2017-06-28 RX ADMIN — Medication 0.5 MILLIGRAM(S): at 21:07

## 2017-06-28 RX ADMIN — MIRTAZAPINE 15 MILLIGRAM(S): 45 TABLET, ORALLY DISINTEGRATING ORAL at 21:01

## 2017-06-28 RX ADMIN — SENNA PLUS 2 TABLET(S): 8.6 TABLET ORAL at 21:01

## 2017-06-28 RX ADMIN — SODIUM CHLORIDE 2 GRAM(S): 9 INJECTION INTRAMUSCULAR; INTRAVENOUS; SUBCUTANEOUS at 23:31

## 2017-06-28 RX ADMIN — AMLODIPINE BESYLATE 5 MILLIGRAM(S): 2.5 TABLET ORAL at 05:17

## 2017-06-28 RX ADMIN — CLOPIDOGREL BISULFATE 75 MILLIGRAM(S): 75 TABLET, FILM COATED ORAL at 11:59

## 2017-06-28 RX ADMIN — ENOXAPARIN SODIUM 40 MILLIGRAM(S): 100 INJECTION SUBCUTANEOUS at 11:59

## 2017-06-28 RX ADMIN — DIVALPROEX SODIUM 125 MILLIGRAM(S): 500 TABLET, DELAYED RELEASE ORAL at 17:31

## 2017-06-28 RX ADMIN — Medication 25 MILLIGRAM(S): at 05:17

## 2017-06-28 RX ADMIN — Medication 88 MICROGRAM(S): at 05:17

## 2017-06-28 RX ADMIN — Medication 40 MILLIEQUIVALENT(S): at 17:31

## 2017-06-28 NOTE — BEHAVIORAL HEALTH ASSESSMENT NOTE - HPI (INCLUDE ILLNESS QUALITY, SEVERITY, DURATION, TIMING, CONTEXT, MODIFYING FACTORS, ASSOCIATED SIGNS AND SYMPTOMS)
Patient is an 86 y/o WF, with multiple prior psychiatric hospitalizations, history of bipolar disorder

## 2017-06-28 NOTE — PROGRESS NOTE ADULT - ASSESSMENT
·	Hyponatremia: HCTZ rx, ? SIADH  ·	hypokalemia  ·	Dementia  ·	MI  ·	? TIA    Salt tabs. Potassium supps. Maintain PO fluid restriction.  Avoid hypotonic fluids. Monitor BP closely. Will follow electrolytes and renal function trend.

## 2017-06-28 NOTE — PROGRESS NOTE ADULT - ASSESSMENT
· Assessment		  Sanaz is hemodynamically stable in sinus rhythm with no evidence of heart failure. Her elevated troponin level does not likely represent primary atherothrombosis although she may have had a component of demand ischemia. Conservative therapy is warranted. She has normal left ventricular function by echo with no significant valvular disease.  Patient is a DNR.    Recommendation:     Patient is allergic to ASA.  Will continue on Plavix  Continue amlodipine for hypertension  Continue metoprolol  We will follow closely with you · Assessment		  Sanaz is hemodynamically stable in sinus rhythm with no evidence of heart failure. Her elevated troponin level does not likely represent primary atherothrombosis although she may have had a component of demand ischemia. Conservative therapy is warranted. She has normal left ventricular function by echo with no significant valvular disease.  Patient is a DNR.    Recommendation:     Patient is allergic to ASA.  Will continue on Plavix  Orthostatic BP and HR x 24 hrs  Continue amlodipine for hypertension  Continue metoprolol  We will follow closely with you

## 2017-06-28 NOTE — PROVIDER CONTACT NOTE (CRITICAL VALUE NOTIFICATION) - TEST AND RESULT REPORTED:
blood culture from 6/25 growth in aerobic bottle coag negative staphylococcus.single cell isolate possible contaminant.contact microbiology.2nd growth in anaerobic bottle gram positive cocciin clusters. blood culture from 6/25 growth in aerobic bottle coag negative     contaminant.contact microbiology if susceptibilitytesting clinically indicated and growth in anaerobic bottle gram positive cocciin clusters.

## 2017-06-28 NOTE — PROGRESS NOTE ADULT - ASSESSMENT
abnl tfts - pos dynamic changes: tsh 0.08 to 0.35 consistent w/ non-thyroidal illness in setting of hypothyroidism

## 2017-06-28 NOTE — CHART NOTE - NSCHARTNOTEFT_GEN_A_CORE
Do you have Advance Directives (HCP / LV / Organ donation / Documentation of oral advance Directive):   ( x   )  yes    (      )    NO                                                                            Do you have LV - Living will :                                                                                                                                             (    )  yes    (   x   )   No    Do you have HCP - Health Care Proxy:                                                                                                                            (   x  )  yes   (       ) N0    Do you have DNR- Do Not Resuscitate :                                                                                                                           (    x  )  yes  (        )  No    Do you have DNI- Do Not intubate  :                                                                                                                               (   x   )  yes   (       ) No    Do you have MOLST - Medical orders for Life sustaining treatment  :                                                                    (  x    ) yes    (       ) No  x  Decision Maker :  (     ) Patient     (   x   )  HCA   (     ) Public Health Law Surrogate     (      ) Surrogate  (       ) Guardian    Goals of Care :  (      )   Complete Care     (       ) No Limitations                              (       )   Comfort Care       (       )  Hospice                               (   x   )   Limited medical Intervention / s    Medical Interventions :   (        )   CPR       (    x    )  DNR                                               (        )  Intubation with MV - Mechanical Ventilation  (      ) BIPAP/CPAP    (     x    )   DNI                                               (         )  Artificial Nutrition -  IVF, TPN / PPN, Tube Feeds             (   x      )   No Feeding Tube                                                (  x      ) Use Antibiotics                         (          ) No Antibiotics                                                (  x       ) Blood and Blood Products     (         )   No Blood or Blood products                                                (          )  Dialysis                                    (    x     )  No Dialysis                                                (       x   )  Medical Management only  (         )  No Invasive Interventions or Surgery  Time spent :                        (     x  ) up to 30 minutes                       (           )   more than 30 minutes    reviewed goals of care

## 2017-06-29 DIAGNOSIS — I10 ESSENTIAL (PRIMARY) HYPERTENSION: ICD-10-CM

## 2017-06-29 DIAGNOSIS — R33.9 RETENTION OF URINE, UNSPECIFIED: ICD-10-CM

## 2017-06-29 LAB
ANION GAP SERPL CALC-SCNC: 8 MMOL/L — SIGNIFICANT CHANGE UP (ref 5–17)
BUN SERPL-MCNC: 11 MG/DL — SIGNIFICANT CHANGE UP (ref 7–23)
CALCIUM SERPL-MCNC: 8.5 MG/DL — SIGNIFICANT CHANGE UP (ref 8.5–10.1)
CHLORIDE SERPL-SCNC: 88 MMOL/L — LOW (ref 96–108)
CO2 SERPL-SCNC: 26 MMOL/L — SIGNIFICANT CHANGE UP (ref 22–31)
CREAT SERPL-MCNC: 0.65 MG/DL — SIGNIFICANT CHANGE UP (ref 0.5–1.3)
CULTURE RESULTS: SIGNIFICANT CHANGE UP
GLUCOSE SERPL-MCNC: 100 MG/DL — HIGH (ref 70–99)
POTASSIUM SERPL-MCNC: 4.5 MMOL/L — SIGNIFICANT CHANGE UP (ref 3.5–5.3)
POTASSIUM SERPL-SCNC: 4.5 MMOL/L — SIGNIFICANT CHANGE UP (ref 3.5–5.3)
SODIUM SERPL-SCNC: 122 MMOL/L — LOW (ref 135–145)

## 2017-06-29 PROCEDURE — 99232 SBSQ HOSP IP/OBS MODERATE 35: CPT

## 2017-06-29 RX ORDER — SODIUM CHLORIDE 9 MG/ML
1 INJECTION INTRAMUSCULAR; INTRAVENOUS; SUBCUTANEOUS DAILY
Qty: 0 | Refills: 0 | Status: DISCONTINUED | OUTPATIENT
Start: 2017-06-29 | End: 2017-06-29

## 2017-06-29 RX ORDER — TOLVAPTAN 15 MG/1
15 TABLET ORAL ONCE
Qty: 0 | Refills: 0 | Status: COMPLETED | OUTPATIENT
Start: 2017-06-29 | End: 2017-06-29

## 2017-06-29 RX ORDER — TAMSULOSIN HYDROCHLORIDE 0.4 MG/1
0.4 CAPSULE ORAL AT BEDTIME
Qty: 0 | Refills: 0 | Status: DISCONTINUED | OUTPATIENT
Start: 2017-06-29 | End: 2017-07-01

## 2017-06-29 RX ADMIN — ENOXAPARIN SODIUM 40 MILLIGRAM(S): 100 INJECTION SUBCUTANEOUS at 12:29

## 2017-06-29 RX ADMIN — Medication 88 MICROGRAM(S): at 05:43

## 2017-06-29 RX ADMIN — Medication 1 MILLIGRAM(S): at 12:29

## 2017-06-29 RX ADMIN — TAMSULOSIN HYDROCHLORIDE 0.4 MILLIGRAM(S): 0.4 CAPSULE ORAL at 21:53

## 2017-06-29 RX ADMIN — SENNA PLUS 2 TABLET(S): 8.6 TABLET ORAL at 21:53

## 2017-06-29 RX ADMIN — MIRTAZAPINE 15 MILLIGRAM(S): 45 TABLET, ORALLY DISINTEGRATING ORAL at 21:53

## 2017-06-29 RX ADMIN — Medication 25 MILLIGRAM(S): at 05:43

## 2017-06-29 RX ADMIN — DIVALPROEX SODIUM 125 MILLIGRAM(S): 500 TABLET, DELAYED RELEASE ORAL at 05:43

## 2017-06-29 RX ADMIN — CLOPIDOGREL BISULFATE 75 MILLIGRAM(S): 75 TABLET, FILM COATED ORAL at 12:29

## 2017-06-29 RX ADMIN — AMLODIPINE BESYLATE 5 MILLIGRAM(S): 2.5 TABLET ORAL at 05:43

## 2017-06-29 RX ADMIN — DIVALPROEX SODIUM 125 MILLIGRAM(S): 500 TABLET, DELAYED RELEASE ORAL at 17:41

## 2017-06-29 RX ADMIN — TOLVAPTAN 15 MILLIGRAM(S): 15 TABLET ORAL at 12:29

## 2017-06-29 NOTE — CONSULT NOTE ADULT - SUBJECTIVE AND OBJECTIVE BOX
Patient is a 87y old  Female who presents with a chief complaint of fainted (26 Jun 2017 12:27)      Reason For Consult: abnl tfts: tsh o.o8, low tt3 69 , nl tt4; hx of hypothyroidism on lt4 88mcg/day    HPI:  This is an 86 yo female with hx of dementia on dementia unit at [facility], HTN, HLD per ems, they were called for patient with left hemiplegia. Pt on their arrival had a [place] score of zero with no deficits. Pt unable to recall what happened. Pt glucose 138. Pt denies any pain but states she felt "faint" earlier. She denies CP, n/v/d, palpitations, SOB, cough, fever. (26 Jun 2017 10:29)      PAST MEDICAL & SURGICAL HISTORY:  Constipation  Asthma  Hyperlipidemia  Hypertension  Anxiety disorder  Delusional disorder  Dementia  No significant past surgical history      FAMILY HISTORY:  No pertinent family history in first degree relatives        Social History:    MEDICATIONS  (STANDING):  diVALproex Sprinkle 125 milliGRAM(s) Oral two times a day  mirtazapine 15 milliGRAM(s) Oral at bedtime  metoprolol succinate ER 25 milliGRAM(s) Oral daily  levothyroxine 88 MICROGram(s) Oral daily  folic acid 1 milliGRAM(s) Oral daily  enoxaparin Injectable 60 milliGRAM(s) SubCutaneous once  senna 2 Tablet(s) Oral at bedtime  enoxaparin Injectable 40 milliGRAM(s) SubCutaneous daily  LORazepam   Injectable 0.5 milliGRAM(s) IV Push once  clopidogrel Tablet 75 milliGRAM(s) Oral daily  amLODIPine   Tablet 5 milliGRAM(s) Oral daily    MEDICATIONS  (PRN):  acetaminophen   Tablet 650 milliGRAM(s) Oral every 6 hours PRN For Temp greater than 38 C (100.4 F)  acetaminophen   Tablet. 650 milliGRAM(s) Oral every 6 hours PRN Mild Pain (1 - 3)  LORazepam     Tablet 0.5 milliGRAM(s) Oral three times a day PRN anxiety/agitation        T(C): 36.8 (06-26-17 @ 23:44), Max: 37 (06-26-17 @ 21:40)  HR: 60 (06-26-17 @ 23:44) (60 - 72)  BP: 124/68 (06-26-17 @ 23:44) (120/67 - 155/80)  RR: 17 (06-26-17 @ 23:44) (17 - 18)  SpO2: 95% (06-26-17 @ 23:44) (95% - 100%)  Wt(kg): --    PHYSICAL EXAM:  GENERAL: NAD, well-groomed, well-developed  HEAD:  Atraumatic, Normocephalic  NECK: Supple, No JVD, Normal thyroid  CHEST/LUNG: Clear to percussion bilaterally; No rales, rhonchi, wheezing, or rubs  HEART: Regular rate and rhythm; No murmurs, rubs, or gallops  ABDOMEN: Soft, Nontender, Nondistended; Bowel sounds present  EXTREMITIES:  2+ Peripheral Pulses, No clubbing, cyanosis, or edema  SKIN: No rashes or lesions    CAPILLARY BLOOD GLUCOSE                                14.1   10.8  )-----------( 233      ( 26 Jun 2017 09:05 )             39.1       CMP:  06-26 @ 09:05  SGPT --  Albumin --   Alk Phos --   Anion Gap 9   SGOT --   Total Bili --   BUN 7   Calcium Total 8.6   CO2 28   Chloride 87   Creatinine 0.50   eGFR if    eGFR if non AA 87   Glucose 94   Potassium 3.3   Protein --   Sodium 124      Thyroid Function Tests:  06-26 @ 12:00 TSH -- FreeT4 -- T3 69 Anti TPO -- Anti Thyroglobulin Ab -- TSI --  06-26 @ 09:05 TSH 0.08 FreeT4 -- T3 -- Anti TPO -- Anti Thyroglobulin Ab -- TSI --      Diabetes Tests:       Radiology:
CHIEF COMPLAINT: Urinary retention    HISTORY OF PRESENT ILLNESS:This is an 86 yo female with hx of dementia on dementia unit at [facility], HTN, HLD per ems, they were called for patient with left hemiplegia. Pt on their arrival had a [place] score of zero with no deficits. Pt unable to recall what happened. Pt glucose 138. Pt denies any pain but states she felt "faint" earlier. She denies CP, n/v/d, palpitations, SOB, cough, fever. (26 Jun 2017 10:29)    History obtained from chart.          cc on bladder sono-unable to void , had not voided and straight cath for approximately 440 cc urine, incontinent and diaper soaked, PVR at present 425 cc, had BM earlier this afternoon    PAST MEDICAL & SURGICAL HISTORY:  Constipation  Asthma  Hyperlipidemia  Hypertension  Anxiety disorder  Delusional disorder  Dementia  No significant past surgical history      REVIEW OF SYSTEMS:    CONSTITUTIONAL: No weakness, fevers or chills  EYES/ENT: No visual changes;  No vertigo or throat pain   NECK: No pain or stiffness  RESPIRATORY: No cough, wheezing, hemoptysis; No shortness of breath  CARDIOVASCULAR: No chest pain or palpitations  GASTROINTESTINAL: No abdominal or epigastric pain. No nausea, vomiting, or hematemesis; No diarrhea or constipation. No melena or hematochezia.  GENITOURINARY:   incontinence  NEUROLOGICAL: No numbness or weakness  SKIN: No itching, burning, rashes, or lesions .    MEDICATIONS  (STANDING):  diVALproex Sprinkle 125 milliGRAM(s) Oral two times a day  mirtazapine 15 milliGRAM(s) Oral at bedtime  metoprolol succinate ER 25 milliGRAM(s) Oral daily  levothyroxine 88 MICROGram(s) Oral daily  folic acid 1 milliGRAM(s) Oral daily  senna 2 Tablet(s) Oral at bedtime  enoxaparin Injectable 40 milliGRAM(s) SubCutaneous daily  amLODIPine   Tablet 5 milliGRAM(s) Oral daily  clopidogrel Tablet 75 milliGRAM(s) Oral daily    MEDICATIONS  (PRN):  acetaminophen   Tablet 650 milliGRAM(s) Oral every 6 hours PRN For Temp greater than 38 C (100.4 F)  LORazepam   Injectable 0.5 milliGRAM(s) IntraMuscular every 6 hours PRN Agitation      Allergies    aspirin (Unknown)  mangoes (Unknown)  shellfish (Unknown)    Intolerances        SOCIAL HISTORY:    FAMILY HISTORY:  No pertinent family history in first degree relatives      Vital Signs Last 24 Hrs  T(C): 36.6 (29 Jun 2017 15:23), Max: 36.7 (28 Jun 2017 20:29)  T(F): 97.9 (29 Jun 2017 15:23), Max: 98.1 (28 Jun 2017 20:29)  HR: 83 (29 Jun 2017 15:23) (71 - 89)  BP: 109/71 (29 Jun 2017 15:23) (109/71 - 158/81)  BP(mean): --  RR: 16 (29 Jun 2017 15:23) (12 - 19)  SpO2: 95% (29 Jun 2017 15:23) (95% - 98%)    PHYSICAL EXAM:    Constitutional: NAD, well-developed  HEENT: LINDA, EOMI, Normal Hearing, MMM  Neck: No LAD, No JVD  Back: No CVA tenderness  Respiratory: CTAB   Cardiovascular: S1 and S2, RRR, no M/G/R  Abd: BS+, soft, NT/ND, No CVAT   Extremities: No peripheral edema  Vascular: 2+ peripheral pulses  Neurological: A/O x 3, no focal deficits  Psychiatric: Normal mood, normal affect  Musculoskeletal: 5/5 strength b/l upper and lower extremities  LABS:                        15.3   9.6   )-----------( 276      ( 28 Jun 2017 07:28 )             43.1     06-29    122<L>  |  88<L>  |  11  ----------------------------<  100<H>  4.5   |  26  |  0.65    Ca    8.5      29 Jun 2017 05:46  Mg     1.9     06-28    TPro  7.0  /  Alb  3.1<L>  /  TBili  0.8  /  DBili  x   /  AST  37  /  ALT  28  /  AlkPhos  99  06-28        Urine Culture:   Hemoglobin: 15.3 g/dL (06-28 @ 07:28)  Hematocrit: 43.1 % (06-28 @ 07:28)      RADIOLOGY & ADDITIONAL STUDIES:< from: US Urinary Bladder (06.29.17 @ 13:01) >  EXAM:  US URINARY BLADDER                            PROCEDURE DATE:  06/29/2017        INTERPRETATION:  Clinical indication: Urinary retention. Evaluate   postvoid residual.    Technique: Pre and post void sonographic images of the urinary bladder  were acquired.    Findings: The urinary bladder has a prevoid volume of 156 cc. No bladder   wall thickening is seen.    Patient was unable to void.    Ureteral jets were not visualized bilaterally.    Impression:    Unable to calculate a postvoid residual as the patient was unable to void.              SHANA TORRES M.D., ATTENDING RADIOLOGIST  This document has been electronically signed. Jun 29 2017  1:10PM        < end of copied text >
[facility] Consultation    CHIEF COMPLAINT: Patient is a 87y old  Female who presents with a chief complaint of feeling lightheaded. Patient is demented and a poor historian. According to the chart her daughter was present and she was found slumped over but apparently not unconscious.      HPI:  This is an 86 yo female with hx of dementia on dementia unit at [facility]. History obtained from chart since patient is a very poor historian. has a history of hypertension and hyperlipidemia. No diabetes and no known history of heart disease, EMS were called for patient with decreased level of consciousness. Pt on their arrival had  no deficits. Pt unable to recall what happened. she remembers that she felt lightheaded like her balance is off. No chest pain, shortness of breath, or palpitation.      PAST MEDICAL & SURGICAL HISTORY:  Constipation  Asthma  Hyperlipidemia  Hypertension  Anxiety disorder  Delusional disorder  Hyperlipidemia  Dementia  No significant past surgical history      SOCIAL HISTORY: No history of smoking, alcohol or illicit drugs    FAMILY HISTORY: FAMILY HISTORY:  No pertinent family history in first degree relatives      MEDICATIONS  (STANDING):  diVALproex Sprinkle 125milliGRAM(s) Oral two times a day  mirtazapine 15milliGRAM(s) Oral at bedtime  metoprolol succinate ER 25milliGRAM(s) Oral daily  amLODIPine   Tablet 5milliGRAM(s) Oral daily  levothyroxine 88MICROGram(s) Oral daily  folic acid 1milliGRAM(s) Oral daily  enoxaparin Injectable 60milliGRAM(s) SubCutaneous once  senna 2Tablet(s) Oral at bedtime  cefTRIAXone   IVPB 1Gram(s) IV Intermittent every 24 hours  azithromycin  IVPB 500milliGRAM(s) IV Intermittent every 24 hours  sodium chloride 0.9%. 1000milliLiter(s) IV Continuous <Continuous>  losartan 25milliGRAM(s) Oral daily  enoxaparin Injectable 40milliGRAM(s) SubCutaneous daily  LORazepam   Injectable 0.5milliGRAM(s) IV Push once    MEDICATIONS  (PRN):  acetaminophen   Tablet 650milliGRAM(s) Oral every 6 hours PRN For Temp greater than 38 C (100.4 F)  acetaminophen   Tablet. 650milliGRAM(s) Oral every 6 hours PRN Mild Pain (1 - 3)  LORazepam     Tablet 0.5milliGRAM(s) Oral three times a day PRN anxiety/agitation      Allergies    aspirin (Unknown)  mangoes (Unknown)  shellfish (Unknown)    Intolerances        REVIEW OF SYSTEMS:    CONSTITUTIONAL: No weakness, fevers or chills, patient states that she can walk with a walker  EYES: No visual changes, No diplopia  ENMT: No throat pain , No exudate  NECK: No pain or stiffness  RESPIRATORY: No cough, wheezing, hemoptysis; No shortness of breath  CARDIOVASCULAR: No chest pain or palpitations  GASTROINTESTINAL: No abdominal pain. No nausea, vomiting, or hematemesis; No diarrhea or constipation. No melena or hematochezia.  GENITOURINARY: No dysuria, frequency or hematuria  NEUROLOGICAL: No numbness or weakness  SKIN: No itching or rash  All other review of systems is negative unless indicated above    VITAL SIGNS:   Vital Signs Last 24 Hrs  T(C): 36.6, Max: 36.8 (06-26 @ 02:25)  T(F): 97.9, Max: 98.3 (06-26 @ 02:25)  HR: 60 (56 - 82)  BP: 112/66 (92/57 - 156/80)  BP(mean): --  RR: 17 (16 - 18)  SpO2: 92% (92% - 98%)    I&O's Summary    I & Os for current day (as of 26 Jun 2017 11:23)  =============================================  IN: 670 ml / OUT: 0 ml / NET: 670 ml      PHYSICAL EXAM:    Constitutional: NAD, awake and alert, well-developed  Eyes:  EOMI,  Pupils round, no lesions  ENMT: no exudate or erythema  Pulmonary: Non-labored, breath sounds are clear bilaterally, No wheezing, rales or rhonchi  Cardiovascular: PMI not palpable  Regular S1 and S2, MALAIKA LSB,  No rubs, gallops or clicks  Gastrointestinal: Bowel Sounds present, soft, nontender.   Lymph: No peripheral edema. No cervical lymphadenopathy.  Neurological: Alert, no focal deficits  Skin: No rashes. No changes of chronic venous stasis. No cyanosis.  Psych: difficult to evaluate with dementia    LABS: All Labs Reviewed:                        14.1   10.8  )-----------( 233      ( 26 Jun 2017 09:05 )             39.1                         12.8   13.0  )-----------( 201      ( 25 Jun 2017 21:05 )             36.5     26 Jun 2017 09:05    124    |  87     |  7      ----------------------------<  94     3.3     |  28     |  0.50   25 Jun 2017 21:05    121    |  83     |  10     ----------------------------<  124    2.7     |  28     |  0.64     Ca    8.6        26 Jun 2017 09:05  Ca    8.2        25 Jun 2017 21:05  Mg     1.8       26 Jun 2017 09:05    TPro  6.6    /  Alb  3.0    /  TBili  0.8    /  DBili  x      /  AST  28     /  ALT  19     /  AlkPhos  96     25 Jun 2017 21:05    PT/INR - ( 25 Jun 2017 21:05 )   PT: 11.7 sec;   INR: 1.07 ratio         PTT - ( 25 Jun 2017 21:05 )  PTT:26.6 sec  CARDIAC MARKERS ( 26 Jun 2017 09:05 )  2.020 ng/mL / x     / 897 U/L / x     / x      CARDIAC MARKERS ( 25 Jun 2017 21:05 )  .445 ng/mL / x     / 284 U/L / x     / 9.1 ng/mL      Blood Culture:     06-26 @ 09:05  TSH: 0.08      RADIOLOGY/EKG:    CT BRAIN  IMPRESSION:   No CT evidence of acute intracranial hemorrhage, brain edema, mass effect   or acute territorial infarct.  Mild cerebral volume loss and mild chronic   right thoracic ischemic disease.  No significant interval change since   03/01/2017.  Follow-up MRI can be obtained as clinically warranted.    EXAM:  PORTABLE CHEST URGENT                            PROCEDURE DATE:  06/25/2017        INTERPRETATION:  Admission.    AP chest. Prior 11/22/2016.    Low lung volumes. No change heart mediastinum. Grossly clear lungs. No   consolidation or effusion. Soft tissue calcification left breast    Impression: No active disease. Stable exam      ECG: RSR , voltage for LVH, nonspecific repolarization
Patient is a 87y old  Female who presents with a chief complaint of fainted (26 Jun 2017 10:29). Renal consult called for hyponatremia.    HPI:  This is an 88 yo female with hx of dementia on dementia unit at [facility], HTN, HLD per ems, they were called for patient with left hemiplegia. Pt on their arrival had a [place] score of zero with no deficits. Pt unable to recall what happened. Pt glucose 138. Pt denies any pain but states she felt "faint" earlier. She denies CP, n/v/d, palpitations, SOB, cough, fever. (26 Jun 2017 10:29)    Renal consult called for hyponatremia. History obtained from chart. Pt denies any urinary complaints.       PAST MEDICAL HISTORY:  Constipation  Asthma  Hyperlipidemia  Hypertension  Anxiety disorder  Delusional disorder  Hyperlipidemia  Dementia      PAST SURGICAL HISTORY:  No significant past surgical history      FAMILY HISTORY:  No pertinent family history in first degree relatives      SOCIAL HISTORY: No smoking or alcohol use     Allergies    aspirin (Unknown)  mangoes (Unknown)  shellfish (Unknown)    Intolerances      MEDICATIONS  (STANDING):  diVALproex Sprinkle 125milliGRAM(s) Oral two times a day  mirtazapine 15milliGRAM(s) Oral at bedtime  metoprolol succinate ER 25milliGRAM(s) Oral daily  amLODIPine   Tablet 5milliGRAM(s) Oral daily  levothyroxine 88MICROGram(s) Oral daily  folic acid 1milliGRAM(s) Oral daily  enoxaparin Injectable 60milliGRAM(s) SubCutaneous once  senna 2Tablet(s) Oral at bedtime  cefTRIAXone   IVPB 1Gram(s) IV Intermittent every 24 hours  azithromycin  IVPB 500milliGRAM(s) IV Intermittent every 24 hours  sodium chloride 0.9%. 1000milliLiter(s) IV Continuous <Continuous>  losartan 25milliGRAM(s) Oral daily  enoxaparin Injectable 40milliGRAM(s) SubCutaneous daily  LORazepam   Injectable 0.5milliGRAM(s) IV Push once    MEDICATIONS  (PRN):  acetaminophen   Tablet 650milliGRAM(s) Oral every 6 hours PRN For Temp greater than 38 C (100.4 F)  acetaminophen   Tablet. 650milliGRAM(s) Oral every 6 hours PRN Mild Pain (1 - 3)  LORazepam     Tablet 0.5milliGRAM(s) Oral three times a day PRN anxiety/agitation    HOME MEDS:  mirtazapine 15 mg oral tablet: 1 tab(s) orally once a day (at bedtime), Last Dose Taken:    · 	Depakote Sprinkles 125 mg oral delayed release capsule:  orally 2 times a day, Last Dose Taken:    · 	Metoprolol Succinate ER 25 mg oral tablet, extended release:  orally 2 times a day, Last Dose Taken:    · 	levothyroxine 88 mcg (0.088 mg) oral capsule: 1 cap(s) orally once a day, Last Dose Taken:    · 	senna 8.6 mg oral tablet: 2 tab(s) orally once a day (at bedtime), Last Dose Taken:    · 	LORazepam 0.5 mg oral tablet: 0.5 tab(s) orally 3 times a day, As Needed, Last Dose Taken:    · 	Norvasc 5 mg oral tablet: 1 tab(s) orally once a day, Last Dose Taken:    · 	folic acid 1 mg oral tablet: 1 tab(s) orally once a day, Last Dose Taken:    · 	hydroCHLOROthiazide 25 mg oral tablet: 1 tab(s) orally once a day, Last Dose Taken:    · 	losartan 100 mg oral tablet: 1 tab(s) orally once a day, Last Dose Taken:       REVIEW OF SYSTEMS:  General: NAD  Respiratory: No cough, SOB  Cardiovascular: No CP or Palpitations	  Gastrointestinal: No nausea, Vomiting. No diarrhea  Genitourinary: No urinary complaints	      T(F): 97.9, Max: 98.3 (06-26 @ 02:25)  HR: 60 (56 - 82)  BP: 112/66 (92/57 - 156/80)  RR: 17 (16 - 18)  SpO2: 92% (92% - 98%)  Wt(kg): --    PHYSICAL EXAM:  General: NAD  Respiratory: b/l air entry  Cardiovascular: S1 S2  Gastrointestinal: soft  Extremities: no edema        06-26    124<L>  |  87<L>  |  7   ----------------------------<  94  3.3<L>   |  28  |  0.50    Ca    8.6      26 Jun 2017 09:05  Mg     1.8     06-26    TPro  6.6  /  Alb  3.0<L>  /  TBili  0.8  /  DBili  x   /  AST  28  /  ALT  19  /  AlkPhos  96  06-25                          14.1   10.8  )-----------( 233      ( 26 Jun 2017 09:05 )             39.1       Potassium, Serum: 3.3 mmol/L (06-26 @ 09:05)  Blood Urea Nitrogen, Serum: 7 mg/dL (06-26 @ 09:05)  Calcium, Total Serum: 8.6 mg/dL (06-26 @ 09:05)  Hemoglobin: 14.1 g/dL (06-26 @ 09:05)      Creatinine, Serum: 0.50 (06-26 @ 09:05)  Creatinine, Serum: 0.64 (06-25 @ 21:05)        LIVER FUNCTIONS - ( 25 Jun 2017 21:05 )  Alb: 3.0 g/dL / Pro: 6.6 g/dL / ALK PHOS: 96 U/L / ALT: 19 U/L / AST: 28 U/L / GGT: x           CARDIAC MARKERS ( 26 Jun 2017 09:05 )  2.020 ng/mL / x     / 897 U/L / x     / x      CARDIAC MARKERS ( 25 Jun 2017 21:05 )  .445 ng/mL / x     / 284 U/L / x     / 9.1 ng/mL      Creatine Kinase, Serum: 897 U/L (06-26 @ 09:05)  Creatine Kinase, Serum: 284 U/L (06-25 @ 21:05)            I&O's Detail    I & Os for current day (as of 26 Jun 2017 11:42)  =============================================  IN:    sodium chloride 0.9%.: 320 ml    Solution: 250 ml    Solution: 100 ml    Total IN: 670 ml  ---------------------------------------------  OUT:    Total OUT: 0 ml  ---------------------------------------------  Total NET: 670 ml

## 2017-06-29 NOTE — PROGRESS NOTE ADULT - ATTENDING COMMENTS
87f dementia, fall htn, chol, hypotension, elevated ce      telemetry can be D/C'd  cont plavix  Continue amlodipine for hypertension  Continue metoprolol  We follow
Chart reviewed patient examined. Agree with plan as outlined above

## 2017-06-29 NOTE — PROVIDER CONTACT NOTE (OTHER) - ASSESSMENT
INSERTED HARO 16FR AS PER ORDERED WITH 10CC BALLON INFLATED.EMPTIED 450CC CLEAR YELLOW URINE.PT COMFORTABLE IN BED.RECHECKED ON PT AND  NOTED PT PULLED OUT HARO.MD NOTIFIED.

## 2017-06-29 NOTE — PROGRESS NOTE ADULT - ASSESSMENT
Sanaz is hemodynamically stable in sinus rhythm with no evidence of heart failure. Her elevated troponin level does not likely represent primary atherothrombosis although she may have had a component of demand ischemia. Conservative therapy is warranted. She has normal left ventricular function by echo with no significant valvular disease.  Patient is a DNR.  Recommendation:

## 2017-06-29 NOTE — CONSULT NOTE ADULT - ASSESSMENT
Incomplete baldder emptying with incontinence (overflow/, sensory unaware?), plan Flomax/borrero and tov in 2-3 days if BM stable

## 2017-06-29 NOTE — PROGRESS NOTE ADULT - ASSESSMENT
·	Hyponatremia: HCTZ rx, ? SIADH, R/o Urinary retention  ·	hypokalemia  ·	Dementia  ·	MI  ·	? TIA    Hold salt tabs. Will dose samsca. Check bladder scan. Avoid hypotonic fluids. Monitor BP closely. Will follow electrolytes and renal function trend. Potassium supplements as needed.

## 2017-06-30 LAB
ANION GAP SERPL CALC-SCNC: 9 MMOL/L — SIGNIFICANT CHANGE UP (ref 5–17)
BUN SERPL-MCNC: 13 MG/DL — SIGNIFICANT CHANGE UP (ref 7–23)
CALCIUM SERPL-MCNC: 9.1 MG/DL — SIGNIFICANT CHANGE UP (ref 8.5–10.1)
CHLORIDE SERPL-SCNC: 96 MMOL/L — SIGNIFICANT CHANGE UP (ref 96–108)
CO2 SERPL-SCNC: 28 MMOL/L — SIGNIFICANT CHANGE UP (ref 22–31)
CREAT SERPL-MCNC: 0.75 MG/DL — SIGNIFICANT CHANGE UP (ref 0.5–1.3)
GLUCOSE SERPL-MCNC: 87 MG/DL — SIGNIFICANT CHANGE UP (ref 70–99)
POTASSIUM SERPL-MCNC: 4.4 MMOL/L — SIGNIFICANT CHANGE UP (ref 3.5–5.3)
POTASSIUM SERPL-SCNC: 4.4 MMOL/L — SIGNIFICANT CHANGE UP (ref 3.5–5.3)
SODIUM SERPL-SCNC: 133 MMOL/L — LOW (ref 135–145)

## 2017-06-30 PROCEDURE — 99232 SBSQ HOSP IP/OBS MODERATE 35: CPT

## 2017-06-30 RX ORDER — DOCUSATE SODIUM 100 MG
100 CAPSULE ORAL DAILY
Qty: 0 | Refills: 0 | Status: DISCONTINUED | OUTPATIENT
Start: 2017-06-30 | End: 2017-07-01

## 2017-06-30 RX ORDER — MAGNESIUM HYDROXIDE 400 MG/1
30 TABLET, CHEWABLE ORAL
Qty: 0 | Refills: 0 | COMMUNITY
Start: 2017-06-30

## 2017-06-30 RX ORDER — DOCUSATE SODIUM 100 MG
1 CAPSULE ORAL
Qty: 0 | Refills: 0 | COMMUNITY
Start: 2017-06-30

## 2017-06-30 RX ORDER — TAMSULOSIN HYDROCHLORIDE 0.4 MG/1
1 CAPSULE ORAL
Qty: 0 | Refills: 0 | COMMUNITY
Start: 2017-06-30

## 2017-06-30 RX ORDER — AMLODIPINE BESYLATE 2.5 MG/1
2.5 TABLET ORAL
Qty: 0 | Refills: 0 | Status: DISCONTINUED | OUTPATIENT
Start: 2017-06-30 | End: 2017-07-01

## 2017-06-30 RX ORDER — MAGNESIUM HYDROXIDE 400 MG/1
30 TABLET, CHEWABLE ORAL DAILY
Qty: 0 | Refills: 0 | Status: DISCONTINUED | OUTPATIENT
Start: 2017-06-30 | End: 2017-07-01

## 2017-06-30 RX ADMIN — DIVALPROEX SODIUM 125 MILLIGRAM(S): 500 TABLET, DELAYED RELEASE ORAL at 17:39

## 2017-06-30 RX ADMIN — Medication 88 MICROGRAM(S): at 06:10

## 2017-06-30 RX ADMIN — AMLODIPINE BESYLATE 2.5 MILLIGRAM(S): 2.5 TABLET ORAL at 17:39

## 2017-06-30 RX ADMIN — AMLODIPINE BESYLATE 5 MILLIGRAM(S): 2.5 TABLET ORAL at 06:10

## 2017-06-30 RX ADMIN — TAMSULOSIN HYDROCHLORIDE 0.4 MILLIGRAM(S): 0.4 CAPSULE ORAL at 22:10

## 2017-06-30 RX ADMIN — ENOXAPARIN SODIUM 40 MILLIGRAM(S): 100 INJECTION SUBCUTANEOUS at 11:23

## 2017-06-30 RX ADMIN — CLOPIDOGREL BISULFATE 75 MILLIGRAM(S): 75 TABLET, FILM COATED ORAL at 11:23

## 2017-06-30 RX ADMIN — Medication 100 MILLIGRAM(S): at 11:23

## 2017-06-30 RX ADMIN — Medication 25 MILLIGRAM(S): at 06:10

## 2017-06-30 RX ADMIN — MIRTAZAPINE 15 MILLIGRAM(S): 45 TABLET, ORALLY DISINTEGRATING ORAL at 22:10

## 2017-06-30 RX ADMIN — DIVALPROEX SODIUM 125 MILLIGRAM(S): 500 TABLET, DELAYED RELEASE ORAL at 06:10

## 2017-06-30 RX ADMIN — SENNA PLUS 2 TABLET(S): 8.6 TABLET ORAL at 22:10

## 2017-06-30 RX ADMIN — Medication 1 MILLIGRAM(S): at 11:23

## 2017-06-30 NOTE — PROGRESS NOTE ADULT - ASSESSMENT
·	Hyponatremia: HCTZ rx, SIADH, Urinary retention  ·	hypokalemia  ·	Dementia  ·	MI  ·	? TIA    Improved sodium levels. Pt pulled out borrero catheter.  follow up. Avoid hypotonic fluids. Monitor BP closely. Will follow electrolytes and renal function trend.

## 2017-06-30 NOTE — PROGRESS NOTE ADULT - ASSESSMENT
Sanaz is hemodynamically stable in sinus rhythm with no evidence of heart failure. Her elevated troponin level does not likely represent primary atherothrombosis although she may have had a component of demand ischemia. Conservative therapy is warranted. She has normal left ventricular function by echo with no significant valvular disease  - Continue  clopidogrel 2/2 ASA allergy  - Continue amlodipine for hypertension  - Continue metoprolol  - Monitor and replete electrolytes. Keep K>4.0 and Mg>2.0.   - Further cardiac workup will depend on clinical course.   - All other workup per primary team. Will followup.

## 2017-06-30 NOTE — PROGRESS NOTE ADULT - PROBLEM SELECTOR PROBLEM 2
NSTEMI (non-ST elevation myocardial infarction)
Hypertension

## 2017-06-30 NOTE — PROGRESS NOTE ADULT - PROBLEM SELECTOR PROBLEM 1
Transient cerebral ischemia, unspecified type
Hypothyroidism, unspecified type
NSTEMI (non-ST elevation myocardial infarction)

## 2017-06-30 NOTE — CHART NOTE - NSCHARTNOTEFT_GEN_A_CORE
called by rn that pt pulled borrero in the afternoon and that she might be retianing   orederd staright cath for now for residual of 600cc  urethral meatus size of alyse , very enlarged , hard suprapubic palpated baldder   please consider sedation or restraints if re-starting borrero   please consider uro consult

## 2017-06-30 NOTE — PROGRESS NOTE ADULT - PROBLEM SELECTOR PLAN 3
- d/c HCTZ  - now on samsca  - renal consult with Dr. VIVEROS appreciated  - serial labs
- d/c HCTZ  - IVF  - renal consult with Dr. VIVEROS  - serial labs
- d/c HCTZ  - IVF  - renal consult with Dr. VIVEROS appreciated  - serial labs
- d/c HCTZ  - now on samsca and improved  - renal consult with Dr. VIVEROS appreciated  - serial labs

## 2017-06-30 NOTE — PROGRESS NOTE ADULT - PROBLEM SELECTOR PLAN 2
- serial enzymes/ekgs  - 2d echo  - continue metoprolol, losartan and plavix  - trend enzymes
-Orthostatic BP and HR x 24 hrs  -Continue amlodipine for hypertension  -We will follow closely

## 2017-06-30 NOTE — PROGRESS NOTE ADULT - PROBLEM SELECTOR PLAN 1
- Admit to tele  - plavix  - neuro check q4h  - carotid u/s  - 2decho  - lipid profile  - physical therapy  - neuro consult with Dr. PATTERSON
- Admit to tele  - plavix  - neuro check q4h  - carotid u/s - WNL  - 2decho  - lipid profile  - physical therapy  - neuro consult with Dr. PATTERSON
- Admit to tele  - plavix  - neuro check q4h  - carotid u/s - WNL  - 2decho  - lipid profile  - physical therapy  - neuro consult with Dr. PATTERSON appreciated
- Admit to tele  - plavix  - neuro check q4h  - carotid u/s - WNL  - 2decho  - lipid profile  - physical therapy  - neuro consult with Dr. PATTERSON appreciated
cont lt4 88mcg/day
-Patient is allergic to ASA.  Will continue on Plavix  -Continue amlodipine for hypertension  -Continue metoprolol  -We will follow closely - plans for discharge

## 2017-07-01 VITALS
HEART RATE: 90 BPM | SYSTOLIC BLOOD PRESSURE: 147 MMHG | OXYGEN SATURATION: 98 % | DIASTOLIC BLOOD PRESSURE: 77 MMHG | RESPIRATION RATE: 16 BRPM | TEMPERATURE: 99 F

## 2017-07-01 LAB
ANION GAP SERPL CALC-SCNC: 7 MMOL/L — SIGNIFICANT CHANGE UP (ref 5–17)
BUN SERPL-MCNC: 20 MG/DL — SIGNIFICANT CHANGE UP (ref 7–23)
CALCIUM SERPL-MCNC: 8.5 MG/DL — SIGNIFICANT CHANGE UP (ref 8.5–10.1)
CHLORIDE SERPL-SCNC: 96 MMOL/L — SIGNIFICANT CHANGE UP (ref 96–108)
CO2 SERPL-SCNC: 28 MMOL/L — SIGNIFICANT CHANGE UP (ref 22–31)
CREAT SERPL-MCNC: 0.75 MG/DL — SIGNIFICANT CHANGE UP (ref 0.5–1.3)
GLUCOSE SERPL-MCNC: 155 MG/DL — HIGH (ref 70–99)
POTASSIUM SERPL-MCNC: 5 MMOL/L — SIGNIFICANT CHANGE UP (ref 3.5–5.3)
POTASSIUM SERPL-SCNC: 5 MMOL/L — SIGNIFICANT CHANGE UP (ref 3.5–5.3)
SODIUM SERPL-SCNC: 131 MMOL/L — LOW (ref 135–145)

## 2017-07-01 PROCEDURE — 85027 COMPLETE CBC AUTOMATED: CPT

## 2017-07-01 PROCEDURE — 99232 SBSQ HOSP IP/OBS MODERATE 35: CPT

## 2017-07-01 PROCEDURE — 87150 DNA/RNA AMPLIFIED PROBE: CPT

## 2017-07-01 PROCEDURE — 71250 CT THORAX DX C-: CPT

## 2017-07-01 PROCEDURE — 85610 PROTHROMBIN TIME: CPT

## 2017-07-01 PROCEDURE — 93880 EXTRACRANIAL BILAT STUDY: CPT

## 2017-07-01 PROCEDURE — 83935 ASSAY OF URINE OSMOLALITY: CPT

## 2017-07-01 PROCEDURE — 96375 TX/PRO/DX INJ NEW DRUG ADDON: CPT

## 2017-07-01 PROCEDURE — 97530 THERAPEUTIC ACTIVITIES: CPT

## 2017-07-01 PROCEDURE — 71045 X-RAY EXAM CHEST 1 VIEW: CPT

## 2017-07-01 PROCEDURE — 84480 ASSAY TRIIODOTHYRONINE (T3): CPT

## 2017-07-01 PROCEDURE — 80053 COMPREHEN METABOLIC PANEL: CPT

## 2017-07-01 PROCEDURE — 99285 EMERGENCY DEPT VISIT HI MDM: CPT | Mod: 25

## 2017-07-01 PROCEDURE — 93005 ELECTROCARDIOGRAM TRACING: CPT

## 2017-07-01 PROCEDURE — 36415 COLL VENOUS BLD VENIPUNCTURE: CPT

## 2017-07-01 PROCEDURE — 76857 US EXAM PELVIC LIMITED: CPT

## 2017-07-01 PROCEDURE — 84145 PROCALCITONIN (PCT): CPT

## 2017-07-01 PROCEDURE — 84484 ASSAY OF TROPONIN QUANT: CPT

## 2017-07-01 PROCEDURE — 70450 CT HEAD/BRAIN W/O DYE: CPT

## 2017-07-01 PROCEDURE — 80061 LIPID PANEL: CPT

## 2017-07-01 PROCEDURE — 83036 HEMOGLOBIN GLYCOSYLATED A1C: CPT

## 2017-07-01 PROCEDURE — 83605 ASSAY OF LACTIC ACID: CPT

## 2017-07-01 PROCEDURE — 96365 THER/PROPH/DIAG IV INF INIT: CPT

## 2017-07-01 PROCEDURE — 85730 THROMBOPLASTIN TIME PARTIAL: CPT

## 2017-07-01 PROCEDURE — 80048 BASIC METABOLIC PNL TOTAL CA: CPT

## 2017-07-01 PROCEDURE — 83735 ASSAY OF MAGNESIUM: CPT

## 2017-07-01 PROCEDURE — 82553 CREATINE MB FRACTION: CPT

## 2017-07-01 PROCEDURE — 82550 ASSAY OF CK (CPK): CPT

## 2017-07-01 PROCEDURE — 84481 FREE ASSAY (FT-3): CPT

## 2017-07-01 PROCEDURE — 84443 ASSAY THYROID STIM HORMONE: CPT

## 2017-07-01 PROCEDURE — 93306 TTE W/DOPPLER COMPLETE: CPT

## 2017-07-01 PROCEDURE — 87040 BLOOD CULTURE FOR BACTERIA: CPT

## 2017-07-01 PROCEDURE — 84436 ASSAY OF TOTAL THYROXINE: CPT

## 2017-07-01 PROCEDURE — 97116 GAIT TRAINING THERAPY: CPT

## 2017-07-01 PROCEDURE — 84300 ASSAY OF URINE SODIUM: CPT

## 2017-07-01 PROCEDURE — 84439 ASSAY OF FREE THYROXINE: CPT

## 2017-07-01 RX ORDER — TAMSULOSIN HYDROCHLORIDE 0.4 MG/1
0.8 CAPSULE ORAL AT BEDTIME
Qty: 0 | Refills: 0 | Status: DISCONTINUED | OUTPATIENT
Start: 2017-07-01 | End: 2017-07-01

## 2017-07-01 RX ORDER — METOPROLOL TARTRATE 50 MG
1 TABLET ORAL
Qty: 60 | Refills: 0
Start: 2017-07-01 | End: 2017-07-31

## 2017-07-01 RX ORDER — SENNA PLUS 8.6 MG/1
2 TABLET ORAL
Qty: 60 | Refills: 0
Start: 2017-07-01 | End: 2017-07-31

## 2017-07-01 RX ORDER — MAGNESIUM HYDROXIDE 400 MG/1
30 TABLET, CHEWABLE ORAL
Qty: 900 | Refills: 0
Start: 2017-07-01 | End: 2017-07-31

## 2017-07-01 RX ORDER — FOLIC ACID 0.8 MG
1 TABLET ORAL
Qty: 30 | Refills: 0 | OUTPATIENT
Start: 2017-07-01 | End: 2017-07-31

## 2017-07-01 RX ORDER — LOSARTAN POTASSIUM 100 MG/1
1 TABLET, FILM COATED ORAL
Qty: 30 | Refills: 0
Start: 2017-07-01 | End: 2017-07-31

## 2017-07-01 RX ORDER — AMLODIPINE BESYLATE 2.5 MG/1
2.5 TABLET ORAL DAILY
Qty: 0 | Refills: 0 | Status: DISCONTINUED | OUTPATIENT
Start: 2017-07-01 | End: 2017-07-01

## 2017-07-01 RX ORDER — MIRTAZAPINE 45 MG/1
1 TABLET, ORALLY DISINTEGRATING ORAL
Qty: 30 | Refills: 0
Start: 2017-07-01 | End: 2017-07-31

## 2017-07-01 RX ORDER — DIVALPROEX SODIUM 500 MG/1
1 TABLET, DELAYED RELEASE ORAL
Qty: 60 | Refills: 0 | OUTPATIENT
Start: 2017-07-01 | End: 2017-07-31

## 2017-07-01 RX ORDER — AMLODIPINE BESYLATE 2.5 MG/1
1 TABLET ORAL
Qty: 30 | Refills: 0
Start: 2017-07-01 | End: 2017-07-31

## 2017-07-01 RX ORDER — DOCUSATE SODIUM 100 MG
1 CAPSULE ORAL
Qty: 30 | Refills: 0
Start: 2017-07-01 | End: 2017-07-31

## 2017-07-01 RX ORDER — LEVOTHYROXINE SODIUM 125 MCG
1 TABLET ORAL
Qty: 30 | Refills: 0 | OUTPATIENT
Start: 2017-07-01 | End: 2017-07-31

## 2017-07-01 RX ORDER — TAMSULOSIN HYDROCHLORIDE 0.4 MG/1
2 CAPSULE ORAL
Qty: 60 | Refills: 0 | OUTPATIENT
Start: 2017-07-01 | End: 2017-07-31

## 2017-07-01 RX ADMIN — CLOPIDOGREL BISULFATE 75 MILLIGRAM(S): 75 TABLET, FILM COATED ORAL at 12:27

## 2017-07-01 RX ADMIN — Medication 25 MILLIGRAM(S): at 06:45

## 2017-07-01 RX ADMIN — Medication 100 MILLIGRAM(S): at 12:28

## 2017-07-01 RX ADMIN — AMLODIPINE BESYLATE 2.5 MILLIGRAM(S): 2.5 TABLET ORAL at 06:45

## 2017-07-01 RX ADMIN — ENOXAPARIN SODIUM 40 MILLIGRAM(S): 100 INJECTION SUBCUTANEOUS at 12:28

## 2017-07-01 RX ADMIN — Medication 1 MILLIGRAM(S): at 12:28

## 2017-07-01 RX ADMIN — DIVALPROEX SODIUM 125 MILLIGRAM(S): 500 TABLET, DELAYED RELEASE ORAL at 06:45

## 2017-07-01 RX ADMIN — Medication 88 MICROGRAM(S): at 06:45

## 2017-07-01 NOTE — PROGRESS NOTE ADULT - NSHPATTENDINGPLANDISCUSS_GEN_ALL_CORE
Silvana Streeter at bedside and Dr. Mann notifier
RN and patient
patient and RN
patient and RN and daughter
patient and rn

## 2017-07-01 NOTE — PROGRESS NOTE ADULT - SUBJECTIVE AND OBJECTIVE BOX
CAPILLARY BLOOD GLUCOSE          Vital Signs Last 24 Hrs  T(C): 36.7 (28 Jun 2017 08:00), Max: 37.3 (27 Jun 2017 20:54)  T(F): 98.1 (28 Jun 2017 08:00), Max: 99.1 (27 Jun 2017 20:54)  HR: 69 (28 Jun 2017 08:00) (69 - 127)  BP: 158/82 (28 Jun 2017 08:00) (113/8 - 172/84)  BP(mean): --  RR: 17 (28 Jun 2017 08:00) (17 - 18)  SpO2: 95% (28 Jun 2017 08:00) (95% - 97%)    General: WN/WD NAD  Respiratory: CTA B/L  CV: RRR, S1S2, no murmurs, rubs or gallops  Abdominal: Soft, NT, ND +BS, Last BM  Extremities: No edema, + peripheral pulses    Hemoglobin A1C, Whole Blood: 5.9 % (06-27 @ 06:07)   06-28    127<L>  |  90<L>  |  7   ----------------------------<  104<H>  3.1<L>   |  29  |  0.50    Ca    8.6      28 Jun 2017 07:28  Mg     1.9     06-28    TPro  7.0  /  Alb  3.1<L>  /  TBili  0.8  /  DBili  x   /  AST  37  /  ALT  28  /  AlkPhos  99  06-28      levothyroxine 88 MICROGram(s) Oral daily
CHIEF COMPLAINT: Urinary retention    HISTORY OF PRESENT ILLNESS:  86 yo with incontinence and PVR greater than 500 ml on CIC, [place] placed and patient immediately pulled it out as she also pulls out IV's, CIC being continued with no complaint from patient, Creatinine 0.75      PAST MEDICAL & SURGICAL HISTORY:  Constipation  Asthma  Hyperlipidemia  Hypertension  Anxiety disorder  Delusional disorder  Dementia  No significant past surgical history      MEDICATIONS  (STANDING):  diVALproex Sprinkle 125 milliGRAM(s) Oral two times a day  mirtazapine 15 milliGRAM(s) Oral at bedtime  metoprolol succinate ER 25 milliGRAM(s) Oral daily  levothyroxine 88 MICROGram(s) Oral daily  folic acid 1 milliGRAM(s) Oral daily  senna 2 Tablet(s) Oral at bedtime  enoxaparin Injectable 40 milliGRAM(s) SubCutaneous daily  clopidogrel Tablet 75 milliGRAM(s) Oral daily  docusate sodium 100 milliGRAM(s) Oral daily  tamsulosin 0.8 milliGRAM(s) Oral at bedtime  amLODIPine   Tablet 2.5 milliGRAM(s) Oral daily    MEDICATIONS  (PRN):  acetaminophen   Tablet 650 milliGRAM(s) Oral every 6 hours PRN For Temp greater than 38 C (100.4 F)  LORazepam   Injectable 0.5 milliGRAM(s) IntraMuscular every 6 hours PRN Agitation  magnesium hydroxide Suspension 30 milliLiter(s) Oral daily PRN Constipation      Allergies    aspirin (Unknown)  mangoes (Unknown)  shellfish (Unknown)        FAMILY HISTORY:  No pertinent family history in first degree relatives      Vital Signs Last 24 Hrs  T(C): 36.4 (01 Jul 2017 08:44), Max: 36.4 (30 Jun 2017 12:06)  T(F): 97.5 (01 Jul 2017 08:44), Max: 97.6 (30 Jun 2017 20:25)  HR: 88 (01 Jul 2017 08:44) (69 - 88)  BP: 113/55 (01 Jul 2017 08:44) (104/57 - 150/77)  BP(mean): --  RR: 16 (01 Jul 2017 08:44) (16 - 18)  SpO2: 97% (01 Jul 2017 08:44) (96% - 99%)    PHYSICAL EXAM:    Constitutional: NAD, well-developed, confused    Abd: BS+, soft, NT/ND, No CVAT     Extremities: No peripheral edema      LABS:    07-01    131<L>  |  96  |  20  ----------------------------<  155<H>  5.0   |  28  |  0.75    Ca    8.5      01 Jul 2017 09:44          Urine Culture:       RADIOLOGY & ADDITIONAL STUDIES:
[facility] -- Ioana Zamorano Grossman, Erickson, Davian Demarco      Follow Up:  cva    Subjective/Observations: Patient seen and examined. Events noted. Resting comfortably in bed. No complaints of chest pain, dyspnea, or palpitations reported. Now with possible u      REVIEW OF SYSTEMS: All other review of systems is negative unless indicated above    PAST MEDICAL & SURGICAL HISTORY:  Constipation  Asthma  Hyperlipidemia  Hypertension  Anxiety disorder  Delusional disorder  Dementia  No significant past surgical history      MEDICATIONS  (STANDING):  diVALproex Sprinkle 125 milliGRAM(s) Oral two times a day  mirtazapine 15 milliGRAM(s) Oral at bedtime  metoprolol succinate ER 25 milliGRAM(s) Oral daily  levothyroxine 88 MICROGram(s) Oral daily  folic acid 1 milliGRAM(s) Oral daily  senna 2 Tablet(s) Oral at bedtime  enoxaparin Injectable 40 milliGRAM(s) SubCutaneous daily  clopidogrel Tablet 75 milliGRAM(s) Oral daily  docusate sodium 100 milliGRAM(s) Oral daily  tamsulosin 0.8 milliGRAM(s) Oral at bedtime  amLODIPine   Tablet 2.5 milliGRAM(s) Oral daily    MEDICATIONS  (PRN):  acetaminophen   Tablet 650 milliGRAM(s) Oral every 6 hours PRN For Temp greater than 38 C (100.4 F)  LORazepam   Injectable 0.5 milliGRAM(s) IntraMuscular every 6 hours PRN Agitation  magnesium hydroxide Suspension 30 milliLiter(s) Oral daily PRN Constipation      Allergies    aspirin (Unknown)  mangoes (Unknown)  shellfish (Unknown)    Intolerances            Vital Signs Last 24 Hrs  T(C): 37.1 (01 Jul 2017 12:39), Max: 37.1 (01 Jul 2017 12:39)  T(F): 98.8 (01 Jul 2017 12:39), Max: 98.8 (01 Jul 2017 12:39)  HR: 90 (01 Jul 2017 12:39) (71 - 90)  BP: 147/77 (01 Jul 2017 12:39) (113/55 - 150/77)  BP(mean): --  RR: 16 (01 Jul 2017 12:39) (16 - 18)  SpO2: 98% (01 Jul 2017 12:39) (96% - 99%)    I&O's Summary    30 Jun 2017 07:01  -  01 Jul 2017 07:00  --------------------------------------------------------  IN: 0 mL / OUT: 500 mL / NET: -500 mL    01 Jul 2017 07:01  -  01 Jul 2017 14:14  --------------------------------------------------------  IN: 240 mL / OUT: 0 mL / NET: 240 mL          PHYSICAL EXAM:     Constitutional: NAD, awake and alert, well-developed  HEENT: Moist Mucous Membranes, Anicteric  Pulmonary: Non-labored, breath sounds are clear bilaterally, No wheezing, rales or rhonchi  Cardiovascular: Regular, S1 and S2, No murmurs, rubs, gallops or clicks  Gastrointestinal: Bowel Sounds present, soft, nontender.   Lymph: No peripheral edema. No lymphadenopathy.  Skin: No visible rashes or ulcers.  Psych:  Mood & affect appropriate    LABS: All Labs Reviewed:    01 Jul 2017 09:44    131    |  96     |  20     ----------------------------<  155    5.0     |  28     |  0.75   30 Jun 2017 06:40    133    |  96     |  13     ----------------------------<  87     4.4     |  28     |  0.75   29 Jun 2017 05:46    122    |  88     |  11     ----------------------------<  100    4.5     |  26     |  0.65     Ca    8.5        01 Jul 2017 09:44  Ca    9.1        30 Jun 2017 06:40  Ca    8.5        29 Jun 2017 05:46
Fall -- possible loc vs mechanical fall   no signs of CVA  tele eval  cardiac work up   fall precautions  pt eval   spoke to daughter
Follow up: Fall, elev troponin    HPI:  This is an 86 yo female with hx of dementia on dementia unit at [facility], HTN, HLD per ems, they were called for patient with left hemiplegia. Pt on their arrival had a [place] score of zero with no deficits. Pt unable to recall what happened. Pt glucose 138. Pt denies any pain but states she felt "faint" earlier. She denies CP, n/v/d, palpitations, SOB, cough, fever. (26 Jun 2017 10:29)    She is sleeping but arousable, appears comfortable today    PAST MEDICAL & SURGICAL HISTORY:  Constipation  Asthma  Hyperlipidemia  Hypertension  Anxiety disorder  Delusional disorder  Dementia  No significant past surgical history      MEDICATIONS  (STANDING):  diVALproex Sprinkle 125 milliGRAM(s) Oral two times a day  mirtazapine 15 milliGRAM(s) Oral at bedtime  metoprolol succinate ER 25 milliGRAM(s) Oral daily  levothyroxine 88 MICROGram(s) Oral daily  folic acid 1 milliGRAM(s) Oral daily  senna 2 Tablet(s) Oral at bedtime  enoxaparin Injectable 40 milliGRAM(s) SubCutaneous daily  clopidogrel Tablet 75 milliGRAM(s) Oral daily  amLODIPine   Tablet 5 milliGRAM(s) Oral daily  vancomycin  IVPB 1000 milliGRAM(s) IV Intermittent once    MEDICATIONS  (PRN):  acetaminophen   Tablet 650 milliGRAM(s) Oral every 6 hours PRN For Temp greater than 38 C (100.4 F)  acetaminophen   Tablet. 650 milliGRAM(s) Oral every 6 hours PRN Mild Pain (1 - 3)  LORazepam     Tablet 0.5 milliGRAM(s) Oral three times a day PRN anxiety/agitation      Vital Signs Last 24 Hrs  T(C): 36.8 (27 Jun 2017 16:15), Max: 37 (26 Jun 2017 21:40)  T(F): 98.3 (27 Jun 2017 16:15), Max: 98.6 (26 Jun 2017 21:40)  HR: 115 (27 Jun 2017 16:15) (60 - 127)  BP: 172/84 (27 Jun 2017 15:52) (122/63 - 172/84)  BP(mean): --  RR: 18 (27 Jun 2017 16:15) (17 - 18)  SpO2: 96% (27 Jun 2017 16:15) (93% - 100%)    I&O's Summary    26 Jun 2017 07:01  -  27 Jun 2017 07:00  --------------------------------------------------------  IN: 250 mL / OUT: 151 mL / NET: 99 mL        PHYSICAL EXAM:    Constitutional: NAD,   Eyes:   Pupils round, no lesions  ENMT: no exudate or erythema  Pulmonary: Non-labored, breath sounds are clear bilaterally, No wheezing, rales or rhonchi  Cardiovascular: PMI not palpable  Regular S1 and S2, 2/6 early systolic murmur at the right upper sternal border radiating to the lsb, no rubs, gallops or clicks  Gastrointestinal: Bowel Sounds present, soft, nontender.   Lymph: No peripheral edema. No cervical lymphadenopathy.  Neurological: no focal deficits  Skin: No rashes. Changes of chronic venous stasis. No cyanosis.  Psych:   Confused.      CARDIAC MARKERS ( 26 Jun 2017 21:11 )  .800 ng/mL / x     / 540 U/L / x     / x      CARDIAC MARKERS ( 26 Jun 2017 14:05 )  1.200 ng/mL / x     / 741 U/L / x     / x      CARDIAC MARKERS ( 26 Jun 2017 09:05 )  2.020 ng/mL / x     / 897 U/L / x     / x      CARDIAC MARKERS ( 25 Jun 2017 21:05 )  .445 ng/mL / x     / 284 U/L / x     / 9.1 ng/mL                            14.1   10.8  )-----------( 233      ( 26 Jun 2017 09:05 )             39.1     CBC Full  -  ( 26 Jun 2017 09:05 )  WBC Count : 10.8 K/uL  Hemoglobin : 14.1 g/dL  Hematocrit : 39.1 %  Platelet Count - Automated : 233 K/uL  Mean Cell Volume : 91.9 fl  Mean Cell Hemoglobin : 33.1 pg  Mean Cell Hemoglobin Concentration : 36.0 gm/dL  Auto Neutrophil # : x  Auto Lymphocyte # : x  Auto Monocyte # : x  Auto Eosinophil # : x  Auto Basophil # : x  Auto Neutrophil % : x  Auto Lymphocyte % : x  Auto Monocyte % : x  Auto Eosinophil % : x  Auto Basophil % : x    06-26    124<L>  |  87<L>  |  7   ----------------------------<  94  3.3<L>   |  28  |  0.50    Ca    8.6      26 Jun 2017 09:05  Mg     1.8     06-26    TPro  6.6  /  Alb  3.0<L>  /  TBili  0.8  /  DBili  x   /  AST  28  /  ALT  19  /  AlkPhos  96  06-25    < from: 12 Lead ECG (06.25.17 @ 20:22) >    Ventricular Rate 69 BPM    Atrial Rate 69 BPM    P-R Interval 200 ms    QRS Duration 88 ms    Q-T Interval 434 ms    QTC Calculation(Bezet) 465 ms    P Axis 66 degrees    R Axis -6 degrees    T Axis -6 degrees    Diagnosis Line Normal sinus rhythm  Minimal voltage criteria for LVH, may be normal variant  Nonspecific ST and T wave abnormality  Abnormal ECG  When compared with ECG of 25-JUN-2017 20:20, (Unconfirmed)  No significant change was found  Confirmed by Marky COLMENARES, Ellis ([id number]) on 6/26/2017 8:20:57 AM    < end of copied text >  < from: CT Chest No Cont (06.26.17 @ 12:01) >    EXAM:  CT CHEST                            PROCEDURE DATE:  06/26/2017        INTERPRETATION:  History: Hypoxemia rule out pneumonia    Noncontrast chest CT.    Normal lung volumes. Very mild fibroatelectatic changes at the dependent   lung bases. No lobar consolidation.  No pleural pericardial effusion.  Central airways patent. Mildly ectatic aortic arch measuring up to 3.8   cm. No mediastinal or hilar adenopathy by CT size criteria. Hilar   evaluation limited in the absence of contrast.  Upper abdomen as imaged not remarkable.  Several subacute to chronic healing left rib fractures.  Multiple poorly defined lucencies throughout the thoracic spine may   reflect profound demineralization. Marrow infiltrative process not   excluded. Correlate with MRI as warranted.    Impression:  No evidence of pneumonia or other acute intrathoracic pathology.  Mildly ectatic aortic arch.  Additional findings as discussed              DELANEY GRIMALDO M.D., ATTENDING RADIOLOGIST  This document has been electronically signed. Jun 26 2017 12:28PM                < end of copied text >    < from: TTE Echo Doppler w/o Cont (06.26.17 @ 10:51) >     EXAM:  ECHO TTE W/O CON COMP W/DOPPLR         PROCEDURE DATE:  06/26/2017        INTERPRETATION:  Ordering Physician: JOANNE ARTEAGA [id number]    Indication: TIA    Technician: DANA    Study Quality: Fair   A complete echocardiographic study was performed utilizing standard   protocol including spectral and color Doppler in all echocardiographic   windows.    Height: 157 cm  Weight: 61 kg  BSA: 1.61m2  Blood Pressure: 112/66    MEASUREMENTS  IVS: 1.1cm  PWT: 1.1cm  LA: 3.2cm  AO: 3.9cm  LVIDd: 4.5cm  LVIDs: 2.5cm  LVOT:    cm    LVEF: 60%  RVSP: 27mm/Hg  RA Pressure: 10mm/Hg  IVC:    cm    FINDINGS  Left Ventricle: Normal left ventricular size, wall thickness, and global   systolic function  Right Ventricle: Normal  Left Atrium: Normal  Right Atrium: Normal  Mitral Valve: Mild thickening of the mitral valve leaflets with mild to   moderate mitral regurgitation  Aortic Valve: Aortic sclerosis with mild to moderate aortic regurgitation  Tricuspid Valve: Mild tricuspid regurgitation  Pulmonic Valve: Mild pulmonic regurgitation  Diastolic Function: Normal  Pericardium/Pleura: No significant effusions      CONCLUSIONS:    1. Normal left ventricular size, wall thickness, and global systolic   function  2. Mild thickening of the mitral valve leaflets with mild to moderate   mitral regurgitation  3. Aortic sclerosis with mild to moderate aortic regurgitation                  NAVID CRUZ M.D., ATTENDING CARDIOLOGIST  This document has been electronically signed. Jun 27 2017  2:25PM              < end of copied text >
GISELA PINA  87y  Female    Patient is a 87y old  Female who presents with a chief complaint of fainted (26 Jun 2017 12:27)  feels much better. no chest pain or shortness of breath.       PAST MEDICAL & SURGICAL HISTORY:  Constipation  Asthma  Hyperlipidemia  Hypertension  Anxiety disorder  Delusional disorder  Dementia  No significant past surgical history          PHYSICAL EXAM:    T(C): 36.8 (06-27-17 @ 16:15), Max: 37 (06-26-17 @ 21:40)  HR: 115 (06-27-17 @ 16:15) (60 - 127)  BP: 172/84 (06-27-17 @ 15:52) (122/63 - 172/84)  RR: 18 (06-27-17 @ 16:15) (17 - 18)  SpO2: 96% (06-27-17 @ 16:15) (93% - 98%)  Wt(kg): --    I&O's Detail    26 Jun 2017 07:01  -  27 Jun 2017 07:00  --------------------------------------------------------  IN:    Solution: 50 mL    Solution: 200 mL  Total IN: 250 mL    OUT:    Voided: 151 mL  Total OUT: 151 mL    Total NET: 99 mL          Respiratory: clear anteriorly, decreased BS at bases  Cardiovascular: S1 S2  Gastrointestinal: soft NT ND +BS  Extremities: edema   Neuro: Awake and alert X 3    MEDICATIONS  (STANDING):  diVALproex Sprinkle 125 milliGRAM(s) Oral two times a day  mirtazapine 15 milliGRAM(s) Oral at bedtime  metoprolol succinate ER 25 milliGRAM(s) Oral daily  levothyroxine 88 MICROGram(s) Oral daily  folic acid 1 milliGRAM(s) Oral daily  senna 2 Tablet(s) Oral at bedtime  enoxaparin Injectable 40 milliGRAM(s) SubCutaneous daily  clopidogrel Tablet 75 milliGRAM(s) Oral daily  amLODIPine   Tablet 5 milliGRAM(s) Oral daily  vancomycin  IVPB 1000 milliGRAM(s) IV Intermittent once    MEDICATIONS  (PRN):  acetaminophen   Tablet 650 milliGRAM(s) Oral every 6 hours PRN For Temp greater than 38 C (100.4 F)  acetaminophen   Tablet. 650 milliGRAM(s) Oral every 6 hours PRN Mild Pain (1 - 3)  LORazepam     Tablet 0.5 milliGRAM(s) Oral three times a day PRN anxiety/agitation  haloperidol    Injectable 1 milliGRAM(s) IntraMuscular every 6 hours PRN Agitation                            14.1   10.8  )-----------( 233      ( 26 Jun 2017 09:05 )             39.1       06-26    124<L>  |  87<L>  |  7   ----------------------------<  94  3.3<L>   |  28  |  0.50    Ca    8.6      26 Jun 2017 09:05  Mg     1.8     06-26    TPro  6.6  /  Alb  3.0<L>  /  TBili  0.8  /  DBili  x   /  AST  28  /  ALT  19  /  AlkPhos  96  06-25
HPI:  This is an 86 yo female with hx of dementia on dementia unit at [facility], HTN, HLD per ems, they were called for patient with left hemiplegia. Pt on their arrival had a [place] score of zero with no deficits. Pt unable to recall what happened. Pt glucose 138. Pt denies any pain but states she felt "faint" earlier. She denies CP, n/v/d, palpitations, SOB, cough, fever. (2017 10:29)      SUBJECTIVE:  Patient is a 87y old  Female who presents with a chief complaint of fainted (2017 12:27)  Seen sitting on the chair.  Denies CP, however, c/o dizziness upon standing.  Also c/o mild BANUELOS, such as getting OOB into the chair.    OBJECTIVE:  Review Of Systems:  Constitutional: [ ] Fever [ ] Chills [ ] Fatigue [ ] Weight change   HEENT: [ ] Blurred vision [ ] Eye Pain [ ] Headache [ ] Runny nose [ ] Sore Throat   Respiratory: [ ] Cough [ ] Wheezing [ ] Shortness of breath  Cardiovascular: [ ] Chest Pain [ ] Palpitations [ ] BANUELOS [ ] PND [ ] Orthopnea  Gastrointestinal: [ ] Abdominal Pain [ ] Diarrhea [ ] Constipation [ ] Hemorrhoids [ ] Nausea [ ] Vomiting  Genitourinary: [ ] Nocturia [ ] Dysuria [ ] Incontinence  Extremities: [ ] Swelling [ ] Joint Pain  Neurologic: [ ] Focal deficit [ ] Paresthesias [ ] Syncope  Lymphatic: [ ] Swelling [ ] Lymphadenopathy   Skin: [ ] Rash [ ] Ecchymoses [ ] Wounds [ ] Lesions  Psychiatry: [ ] Depression [ ] Suicidal/Homicidal Ideation [ ] Anxiety [ ] Sleep Disturbances  [x ] 10 point review of systems is otherwise negative except as mentioned above            [ ]Unable to obtain    Allergy:  Allergies    aspirin (Unknown)  mangoes (Unknown)  shellfish (Unknown)    Intolerances    Medications:  MEDICATIONS  (STANDING):  diVALproex Sprinkle 125 milliGRAM(s) Oral two times a day  mirtazapine 15 milliGRAM(s) Oral at bedtime  metoprolol succinate ER 25 milliGRAM(s) Oral daily  levothyroxine 88 MICROGram(s) Oral daily  folic acid 1 milliGRAM(s) Oral daily  senna 2 Tablet(s) Oral at bedtime  enoxaparin Injectable 40 milliGRAM(s) SubCutaneous daily  amLODIPine   Tablet 5 milliGRAM(s) Oral daily  clopidogrel Tablet 75 milliGRAM(s) Oral daily    MEDICATIONS  (PRN):  acetaminophen   Tablet 650 milliGRAM(s) Oral every 6 hours PRN For Temp greater than 38 C (100.4 F)  LORazepam   Injectable 0.5 milliGRAM(s) IntraMuscular every 6 hours PRN Agitation    PMH/PSH/FH/SH: [ ] Unchanged    Vitals:  T(C): 36.7 (17 @ 08:00), Max: 37.3 (17 @ 20:54)  HR: 69 (17 @ 08:00) (69 - 127)  BP: 158/82 (17 @ 08:00) (113/8 - 172/84)  BP(mean): --  RR: 17 (17 @ 08:00) (17 - 18)  SpO2: 95% (17 @ 08:00) (95% - 96%)  Wt(kg): --  Daily     Daily Weight in k.2 (2017 04:15)  I&O's Summary    Labs:                        15.3   9.6   )-----------( 276      ( 2017 07:28 )             43.1         127<L>  |  90<L>  |  7   ----------------------------<  104<H>  3.1<L>   |  29  |  0.50    Ca    8.6      2017 07:28  Mg     1.9         TPro  7.0  /  Alb  3.1<L>  /  TBili  0.8  /  DBili  x   /  AST  37  /  ALT  28  /  AlkPhos  99        CARDIAC MARKERS ( 2017 21:11 )  .800 ng/mL / x     / 540 U/L / x     / x      CARDIAC MARKERS ( 2017 14:05 )  1.200 ng/mL / x     / 741 U/L / x     / x        Magnesium, Serum: 1.9 mg/dL ( @ 07:28)    ECG:  < from: 12 Lead ECG (17 @ 20:22) >    Ventricular Rate 69 BPM    Atrial Rate 69 BPM    P-R Interval 200 ms    QRS Duration 88 ms    Q-T Interval 434 ms    QTC Calculation(Bezet) 465 ms    P Axis 66 degrees    R Axis -6 degrees    T Axis -6 degrees    Diagnosis Line Normal sinus rhythm  Minimal voltage criteria for LVH, may be normal variant  Nonspecific ST and T wave abnormality  Abnormal ECG  When compared with ECG of 2017 20:20, (Unconfirmed)  No significant change was found  Confirmed by Ellis Negro MD ([id number]) on 2017 8:20:57 AM    < end of copied text >    Echo:  < from: TTE Echo Doppler w/o Cont (17 @ 10:51) >     EXAM:  ECHO TTE W/O CON COMP W/DOPPLR         PROCEDURE DATE:  2017        INTERPRETATION:  Ordering Physician: JOANNE ARTEAGA [id number]    Indication: TIA    Technician: DANA    Study Quality: Fair   A complete echocardiographic study was performed utilizing standard   protocol including spectral and color Doppler in all echocardiographic   windows.    Height: 157 cm  Weight: 61 kg  BSA: 1.61m2  Blood Pressure: 112/66    MEASUREMENTS  IVS: 1.1cm  PWT: 1.1cm  LA: 3.2cm  AO: 3.9cm  LVIDd: 4.5cm  LVIDs: 2.5cm  LVOT:    cm    LVEF: 60%  RVSP: 27mm/Hg  RA Pressure: 10mm/Hg  IVC:    cm    FINDINGS  Left Ventricle: Normal left ventricular size, wall thickness, and global   systolic function  Right Ventricle: Normal  Left Atrium: Normal  Right Atrium: Normal  Mitral Valve: Mild thickening of the mitral valve leaflets with mild to   moderate mitral regurgitation  Aortic Valve: Aortic sclerosis with mild to moderate aortic regurgitation  Tricuspid Valve: Mild tricuspid regurgitation  Pulmonic Valve: Mild pulmonic regurgitation  Diastolic Function: Normal  Pericardium/Pleura: No significant effusions  CONCLUSIONS:    1. Normal left ventricular size, wall thickness, and global systolic   function  2. Mild thickening of the mitral valve leaflets with mild to moderate   mitral regurgitation  3. Aortic sclerosis with mild to moderate aortic regurgitation      NAVID CRUZ M.D., ATTENDING CARDIOLOGIST  This document has been electronically signed. 2017  2:25PM    Stress Testing:     Cath:    Imaging:    Interpretation of Telemetry:      Physical Exam:  Appearance: [x ] Normal  [ ] abnormal [x ] NAD   Eyes: [x ] PERRL [ ] EOMI  HENT: [x ] Normal [ ] Abnormal oral muscosa [ ]NC/AT  Cardiovascular: [x ] S1 [x ] S2 [ ] RRR [x ] m/r/g [ ]edema [ ] JVP  Procedural Access Site: [ ]  hematoma [ ] tender to palpation [ ] 2+ pulse [ ] bruit [ ] Ecchymosis  Respiratory: [x ] Clear to auscultation bilaterally  Gastrointestinal: [ ] Soft [ ] tenderness[ ] distension [x ] BS+  Musculoskeletal: [ ] clubbing [ ] joint deformity   Neurologic: [x ] Non-focal   Lymphatic: [ ] lymphadenopathy  Psychiatry: [ ] AAOx3  [x ] confused [ ] disoriented [ ] Mood & affect appropriate [x] Flat  Skin: [ ]  rashes [ ] ecchymoses [ ] cyanosis
Neurology follow up note    GISELA [id number]      Interval History:    Patient feels ok no new complaints.    MEDICATIONS    diVALproex Sprinkle 125 milliGRAM(s) Oral two times a day  mirtazapine 15 milliGRAM(s) Oral at bedtime  metoprolol succinate ER 25 milliGRAM(s) Oral daily  levothyroxine 88 MICROGram(s) Oral daily  folic acid 1 milliGRAM(s) Oral daily  acetaminophen   Tablet 650 milliGRAM(s) Oral every 6 hours PRN  senna 2 Tablet(s) Oral at bedtime  enoxaparin Injectable 40 milliGRAM(s) SubCutaneous daily  LORazepam   Injectable 0.5 milliGRAM(s) IntraMuscular every 6 hours PRN  clopidogrel Tablet 75 milliGRAM(s) Oral daily  tamsulosin 0.4 milliGRAM(s) Oral at bedtime  docusate sodium 100 milliGRAM(s) Oral daily  magnesium hydroxide Suspension 30 milliLiter(s) Oral daily PRN  amLODIPine   Tablet 2.5 milliGRAM(s) Oral two times a day      Allergies    aspirin (Unknown)  mangoes (Unknown)  shellfish (Unknown)    Intolerances            Vital Signs Last 24 Hrs  T(C): 36.4 (30 Jun 2017 12:06), Max: 36.7 (29 Jun 2017 23:30)  T(F): 97.5 (30 Jun 2017 12:06), Max: 98 (29 Jun 2017 23:30)  HR: 69 (30 Jun 2017 12:06) (61 - 83)  BP: 104/57 (30 Jun 2017 12:06) (104/57 - 134/72)  BP(mean): --  RR: 17 (30 Jun 2017 12:06) (16 - 19)  SpO2: 97% (30 Jun 2017 12:06) (93% - 98%)        REVIEW OF SYSTEMS: Non Verbal  Limit or unable to obtain secondary to patient's poor mental status.    Constitutional: No fever, chills, fatigue, weakness  Eyes: no eye pain, visual disturbances, or discharge  ENT:  No difficulty hearing, tinnitus, vertigo; No sinus or throat pain  Neck: No pain or stiffness  Respiratory: No cough, dyspnea, wheezing   Cardiovascular: No chest pain, palpitations,   Gastrointestinal: No abdominal or epigastric pain. No nausea, vomiting  No diarrhea or constipation.   Genitourinary: No dysuria, frequency, hematuria or incontinence  Neurological: No headaches, lightheadedness, vertigo, numbness or tremors  Psychiatric: No depression, anxiety, mood swings or difficulty sleeping  Musculoskeletal: No joint pain or swelling; No muscle, back or extremity pain  Skin: No itching, burning, rashes or lesions   Lymph Nodes: No enlarged glands  Endocrine: No heat or cold intolerance; No hair loss   Allergy and Immunologic: No hives or eczema    On Neurological Examination:    Mental Status - Patient is alert, awake,  Confused Dementia       Follow simple commands      Speech -   Fluent                Cranial Nerves - Pupils 3 mm equal and reactive to light,   extraocular eye movements intact.   smile symmetric  intact bilateral NLF    Motor Exam -     With stimuli positive movement of all 4 extremities    Muscle tone - is normal all over.  No asymmetry is seen.      Sensory    Bilateral intact to light touch      GENERAL Exam: Nontoxic , No Acute Distress   	  HEENT:  normocephalic, atraumatic  		  LUNGS: Clear bilaterally    	  HEART: Normal S1S2   No murmur RRR        	  GI/ ABDOMEN:  Soft  Non tender    EXTREMITIES:   No Edema  No Clubbing  No Cyanosis No Edema    MUSCULOSKELETAL: Normal Range of Motion  	   SKIN: Normal  No Ecchymosis             LABS:      06-30    133<L>  |  96  |  13  ----------------------------<  87  4.4   |  28  |  0.75    Ca    9.1      30 Jun 2017 06:40      Hemoglobin A1C:       Vitamin B12         RADIOLOGY    ANALYSIS AND PLAN:    1.	An 87-year-old with a history of dementia and change in mental status, found on the ground.  2.	For episode of found on the ground, possible syncopal event, the patient at one point was hypotensive in the hospital setting versus possibly a mechanical fall.  3.	Telemetry evaluation to rule out underlying arrhythmia.  4.	For history of dementia secondary to the patient's age and she did try medications from the outside, which she did have side effects from, I would recommend supportive therapy.  5.	Changes in mental status most likely secondary to dementia made worse in the hospital setting.  6.	I would recommend Physical Therapy evaluation.  7.	Fall precautions.  8.	Spoke with daughter, Syl, at [phone] 6/30/17, she does understand that while in the hospital setting, the mother does become more disoriented and confused, sundowning.  9.	monitor sodium levels   10.	 neurologic wise stable   11.	no new events     Thank you for the courtesy of this consultation.    Physical therapy evaluation.  OOB to chair/ambulation with assistance only.  Advanced care planning was discussed with family.  Pain is accessed and addressed.  Pt was screened for signs of clinical depression. Appropriate referral made.  Risk of falls accessed. Fall prevention and plan of care was discussed with family.  Pt is screened for tobacco and alcohol use. Counseling was done for smoking and alcohol cessation.  Use of narcotic pain meds was discussed Pt is advised to use narcotic meds wisely and to refrain from over using them.  Plan of care was discussed with family. Questions answered.  Would continue to follow.  25 minutes spent on total encounter; more than 50% of the visit was spent counseling and/or coordinating care by the attending physician.
Neurology follow up note    GISELA [id number]      Interval History:    Patient feels ok no new complaints.    MEDICATIONS    diVALproex Sprinkle 125 milliGRAM(s) Oral two times a day  mirtazapine 15 milliGRAM(s) Oral at bedtime  metoprolol succinate ER 25 milliGRAM(s) Oral daily  levothyroxine 88 MICROGram(s) Oral daily  folic acid 1 milliGRAM(s) Oral daily  acetaminophen   Tablet 650 milliGRAM(s) Oral every 6 hours PRN  senna 2 Tablet(s) Oral at bedtime  enoxaparin Injectable 40 milliGRAM(s) SubCutaneous daily  amLODIPine   Tablet 5 milliGRAM(s) Oral daily  LORazepam   Injectable 0.5 milliGRAM(s) IntraMuscular every 6 hours PRN  clopidogrel Tablet 75 milliGRAM(s) Oral daily  tolvaptan 15 milliGRAM(s) Oral once      Allergies    aspirin (Unknown)  mangoes (Unknown)  shellfish (Unknown)    Intolerances            Vital Signs Last 24 Hrs  T(C): 36.4 (29 Jun 2017 07:53), Max: 36.7 (28 Jun 2017 20:29)  T(F): 97.6 (29 Jun 2017 07:53), Max: 98.1 (28 Jun 2017 20:29)  HR: 71 (29 Jun 2017 07:53) (71 - 89)  BP: 128/75 (29 Jun 2017 07:53) (128/75 - 164/76)  BP(mean): --  RR: 17 (29 Jun 2017 07:53) (12 - 19)  SpO2: 96% (29 Jun 2017 07:53) (96% - 98%)    REVIEW OF SYSTEMS: Non Verbal  Limit or unable to obtain secondary to patient's poor mental status.    Constitutional: No fever, chills, fatigue, weakness  Eyes: no eye pain, visual disturbances, or discharge  ENT:  No difficulty hearing, tinnitus, vertigo; No sinus or throat pain  Neck: No pain or stiffness  Respiratory: No cough, dyspnea, wheezing   Cardiovascular: No chest pain, palpitations,   Gastrointestinal: No abdominal or epigastric pain. No nausea, vomiting  No diarrhea or constipation.   Genitourinary: No dysuria, frequency, hematuria or incontinence  Neurological: No headaches, lightheadedness, vertigo, numbness or tremors  Psychiatric: No depression, anxiety, mood swings or difficulty sleeping  Musculoskeletal: No joint pain or swelling; No muscle, back or extremity pain  Skin: No itching, burning, rashes or lesions   Lymph Nodes: No enlarged glands  Endocrine: No heat or cold intolerance; No hair loss   Allergy and Immunologic: No hives or eczema    On Neurological Examination:    Mental Status - Patient is alert, awake,  Confused Dementia       Follow simple commands      Speech -   Fluent                Cranial Nerves - Pupils 3 mm equal and reactive to light,   extraocular eye movements intact.   smile symmetric  intact bilateral NLF    Motor Exam -     With stimuli positive movement of all 4 extremities    Muscle tone - is normal all over.  No asymmetry is seen.      Sensory    Bilateral intact to light touch      GENERAL Exam: Nontoxic , No Acute Distress   	  HEENT:  normocephalic, atraumatic  		  LUNGS: Clear bilaterally    	  HEART: Normal S1S2   No murmur RRR        	  GI/ ABDOMEN:  Soft  Non tender    EXTREMITIES:   No Edema  No Clubbing  No Cyanosis No Edema    MUSCULOSKELETAL: Normal Range of Motion  	   SKIN: Normal  No Ecchymosis            LABS:  CBC Full  -  ( 28 Jun 2017 07:28 )  WBC Count : 9.6 K/uL  Hemoglobin : 15.3 g/dL  Hematocrit : 43.1 %  Platelet Count - Automated : 276 K/uL  Mean Cell Volume : 92.7 fl  Mean Cell Hemoglobin : 32.9 pg  Mean Cell Hemoglobin Concentration : 35.5 gm/dL  Auto Neutrophil # : x  Auto Lymphocyte # : x  Auto Monocyte # : x  Auto Eosinophil # : x  Auto Basophil # : x  Auto Neutrophil % : x  Auto Lymphocyte % : x  Auto Monocyte % : x  Auto Eosinophil % : x  Auto Basophil % : x      06-29    122<L>  |  88<L>  |  11  ----------------------------<  100<H>  4.5   |  26  |  0.65    Ca    8.5      29 Jun 2017 05:46  Mg     1.9     06-28    TPro  7.0  /  Alb  3.1<L>  /  TBili  0.8  /  DBili  x   /  AST  37  /  ALT  28  /  AlkPhos  99  06-28    Hemoglobin A1C:     LIVER FUNCTIONS - ( 28 Jun 2017 07:28 )  Alb: 3.1 g/dL / Pro: 7.0 g/dL / ALK PHOS: 99 U/L / ALT: 28 U/L / AST: 37 U/L / GGT: x           Vitamin B12         RADIOLOGY      ANALYSIS AND PLAN:    1.	An 87-year-old with a history of dementia and change in mental status, found on the ground.  2.	For episode of found on the ground, possible syncopal event, the patient at one point was hypotensive in the hospital setting versus possibly a mechanical fall.  3.	I would recommend to rule out cardiac etiology with elevated troponins.  4.	Telemetry evaluation to rule out underlying arrhythmia.  5.	For history of dementia secondary to the patient's age and she did try medications from the outside, which she did have side effects from, I would recommend supportive therapy.  6.	Changes in mental status most likely secondary to dementia made worse in the hospital setting.  7.	I would recommend Physical Therapy evaluation.  8.	Fall precautions.  9.	Spoke with daughter, Syl, at [phone] 6/29/17, she does understand that while in the hospital setting, the mother does become more disoriented and confused, sundowning.  10.	monitor sodium levels   11.	 neurologic wise stable   12.	no new events     Thank you for the courtesy of this consultation.    Physical therapy evaluation.  OOB to chair/ambulation with assistance only.  Advanced care planning was discussed with family.  Pain is accessed and addressed.  Pt was screened for signs of clinical depression. Appropriate referral made.  Risk of falls accessed. Fall prevention and plan of care was discussed with family.  Pt is screened for tobacco and alcohol use. Counseling was done for smoking and alcohol cessation.  Use of narcotic pain meds was discussed Pt is advised to use narcotic meds wisely and to refrain from over using them.  Plan of care was discussed with family. Questions answered.  Would continue to follow.  30 minutes spent on total encounter; more than 50% of the visit was spent counseling and/or coordinating care by the attending physician.
Neurology follow up note    GISELA [id number]      Interval History:    Patient feels ok no new complaints.    MEDICATIONS    diVALproex Sprinkle 125 milliGRAM(s) Oral two times a day  mirtazapine 15 milliGRAM(s) Oral at bedtime  metoprolol succinate ER 25 milliGRAM(s) Oral daily  levothyroxine 88 MICROGram(s) Oral daily  folic acid 1 milliGRAM(s) Oral daily  acetaminophen   Tablet 650 milliGRAM(s) Oral every 6 hours PRN  senna 2 Tablet(s) Oral at bedtime  enoxaparin Injectable 40 milliGRAM(s) SubCutaneous daily  LORazepam   Injectable 0.5 milliGRAM(s) IntraMuscular every 6 hours PRN  clopidogrel Tablet 75 milliGRAM(s) Oral daily  docusate sodium 100 milliGRAM(s) Oral daily  magnesium hydroxide Suspension 30 milliLiter(s) Oral daily PRN  tamsulosin 0.8 milliGRAM(s) Oral at bedtime  amLODIPine   Tablet 2.5 milliGRAM(s) Oral daily      Allergies    aspirin (Unknown)  mangoes (Unknown)  shellfish (Unknown)    Intolerances            Vital Signs Last 24 Hrs  T(C): 36.4 (01 Jul 2017 08:44), Max: 36.4 (30 Jun 2017 12:06)  T(F): 97.5 (01 Jul 2017 08:44), Max: 97.6 (30 Jun 2017 20:25)  HR: 88 (01 Jul 2017 08:44) (69 - 88)  BP: 113/55 (01 Jul 2017 08:44) (104/57 - 150/77)  BP(mean): --  RR: 16 (01 Jul 2017 08:44) (16 - 18)  SpO2: 97% (01 Jul 2017 08:44) (96% - 99%)    REVIEW OF SYSTEMS: Non Verbal  Limit or unable to obtain secondary to patient's poor mental status.    Constitutional: No fever, chills, fatigue, weakness  Eyes: no eye pain, visual disturbances, or discharge  ENT:  No difficulty hearing, tinnitus, vertigo; No sinus or throat pain  Neck: No pain or stiffness  Respiratory: No cough, dyspnea, wheezing   Cardiovascular: No chest pain, palpitations,   Gastrointestinal: No abdominal or epigastric pain. No nausea, vomiting  No diarrhea or constipation.   Genitourinary: No dysuria, frequency, hematuria or incontinence  Neurological: No headaches, lightheadedness, vertigo, numbness or tremors  Psychiatric: No depression, anxiety, mood swings or difficulty sleeping  Musculoskeletal: No joint pain or swelling; No muscle, back or extremity pain  Skin: No itching, burning, rashes or lesions   Lymph Nodes: No enlarged glands  Endocrine: No heat or cold intolerance; No hair loss   Allergy and Immunologic: No hives or eczema    On Neurological Examination:    Mental Status - Patient is alert, awake,  Confused Dementia       Follow simple commands      Speech -   Fluent                Cranial Nerves - Pupils 3 mm equal and reactive to light,   extraocular eye movements intact.   smile symmetric  intact bilateral NLF    Motor Exam -     With stimuli positive movement of all 4 extremities    Muscle tone - is normal all over.  No asymmetry is seen.      Sensory    Bilateral intact to light touch      GENERAL Exam: Nontoxic , No Acute Distress   	  HEENT:  normocephalic, atraumatic  		  LUNGS: Clear bilaterally    	  HEART: Normal S1S2   No murmur RRR        	  GI/ ABDOMEN:  Soft  Non tender    EXTREMITIES:   No Edema  No Clubbing  No Cyanosis No Edema    MUSCULOSKELETAL: Normal Range of Motion  	   SKIN: Normal  No Ecchymosis             LABS:      07-01    131<L>  |  96  |  20  ----------------------------<  155<H>  5.0   |  28  |  0.75    Ca    8.5      01 Jul 2017 09:44      Hemoglobin A1C:       Vitamin B12         RADIOLOGY    ANALYSIS AND PLAN:    1.	An 87-year-old with a history of dementia and change in mental status, found on the ground.  2.	For episode of found on the ground, possible syncopal event, the patient at one point was hypotensive in the hospital setting versus possibly a mechanical fall.  3.	Telemetry evaluation to rule out underlying arrhythmia.  4.	For history of dementia secondary to the patient's age and she did try medications from the outside, which she did have side effects from, I would recommend supportive therapy.  5.	Changes in mental status most likely secondary to dementia made worse in the hospital setting.  6.	I would recommend Physical Therapy evaluation.  7.	Fall precautions.  8.	Spoke with daughter, Syl, at [phone] 7/1/17, she does understand that while in the hospital setting, the mother does become more disoriented and confused, sundowning.  9.	monitor sodium levels   10.	 neurologic wise stable   11.	no new events     Thank you for the courtesy of this consultation.    Physical therapy evaluation.  OOB to chair/ambulation with assistance only.  Advanced care planning was discussed with family.  Pain is accessed and addressed.  Pt was screened for signs of clinical depression. Appropriate referral made.  Risk of falls accessed. Fall prevention and plan of care was discussed with family.  Pt is screened for tobacco and alcohol use. Counseling was done for smoking and alcohol cessation.  Use of narcotic pain meds was discussed Pt is advised to use narcotic meds wisely and to refrain from over using them.  Plan of care was discussed with family. Questions answered.  Would continue to follow.  25 minutes spent on total encounter; more than 50% of the visit was spent counseling and/or coordinating care by the attending physician.
Neurology follow up note    GISELA [id number]      Interval History:    Patient feels ok no new complaints.    MEDICATIONS    diVALproex Sprinkle 125 milliGRAM(s) Oral two times a day  mirtazapine 15 milliGRAM(s) Oral at bedtime  metoprolol succinate ER 25 milliGRAM(s) Oral daily  levothyroxine 88 MICROGram(s) Oral daily  folic acid 1 milliGRAM(s) Oral daily  acetaminophen   Tablet 650 milliGRAM(s) Oral every 6 hours PRN  enoxaparin Injectable 60 milliGRAM(s) SubCutaneous once  acetaminophen   Tablet. 650 milliGRAM(s) Oral every 6 hours PRN  senna 2 Tablet(s) Oral at bedtime  enoxaparin Injectable 40 milliGRAM(s) SubCutaneous daily  LORazepam   Injectable 0.5 milliGRAM(s) IV Push once  LORazepam     Tablet 0.5 milliGRAM(s) Oral three times a day PRN  clopidogrel Tablet 75 milliGRAM(s) Oral daily  amLODIPine   Tablet 5 milliGRAM(s) Oral daily      Allergies    aspirin (Unknown)  mangoes (Unknown)  shellfish (Unknown)    Intolerances            Vital Signs Last 24 Hrs  T(C): 36.7 (27 Jun 2017 12:00), Max: 37 (26 Jun 2017 21:40)  T(F): 98.1 (27 Jun 2017 12:00), Max: 98.6 (26 Jun 2017 21:40)  HR: 91 (27 Jun 2017 12:00) (60 - 91)  BP: 132/77 (27 Jun 2017 12:00) (122/63 - 159/78)  BP(mean): --  RR: 18 (27 Jun 2017 12:00) (17 - 18)  SpO2: 97% (27 Jun 2017 12:00) (93% - 100%)      REVIEW OF SYSTEMS: Non Verbal  Limit or unable to obtain secondary to patient's poor mental status.    Constitutional: No fever, chills, fatigue, weakness  Eyes: no eye pain, visual disturbances, or discharge  ENT:  No difficulty hearing, tinnitus, vertigo; No sinus or throat pain  Neck: No pain or stiffness  Respiratory: No cough, dyspnea, wheezing   Cardiovascular: No chest pain, palpitations,   Gastrointestinal: No abdominal or epigastric pain. No nausea, vomiting  No diarrhea or constipation.   Genitourinary: No dysuria, frequency, hematuria or incontinence  Neurological: No headaches, lightheadedness, vertigo, numbness or tremors  Psychiatric: No depression, anxiety, mood swings or difficulty sleeping  Musculoskeletal: No joint pain or swelling; No muscle, back or extremity pain  Skin: No itching, burning, rashes or lesions   Lymph Nodes: No enlarged glands  Endocrine: No heat or cold intolerance; No hair loss   Allergy and Immunologic: No hives or eczema    On Neurological Examination:    Mental Status - Patient is alert, awake,  Confused Dementia       Follow simple commands      Speech -   Fluent                Cranial Nerves - Pupils 3 mm equal and reactive to light,   extraocular eye movements intact.   smile symmetric  intact bilateral NLF    Motor Exam -     With stimuli positive movement of all 4 extremities    Muscle tone - is normal all over.  No asymmetry is seen.      Sensory    Bilateral intact to light touch      GENERAL Exam: Nontoxic , No Acute Distress   	  HEENT:  normocephalic, atraumatic  		  LUNGS: Clear bilaterally    	  HEART: Normal S1S2   No murmur RRR        	  GI/ ABDOMEN:  Soft  Non tender    EXTREMITIES:   No Edema  No Clubbing  No Cyanosis No Edema    MUSCULOSKELETAL: Normal Range of Motion  	   SKIN: Normal  No Ecchymosis               LABS:  CBC Full  -  ( 26 Jun 2017 09:05 )  WBC Count : 10.8 K/uL  Hemoglobin : 14.1 g/dL  Hematocrit : 39.1 %  Platelet Count - Automated : 233 K/uL  Mean Cell Volume : 91.9 fl  Mean Cell Hemoglobin : 33.1 pg  Mean Cell Hemoglobin Concentration : 36.0 gm/dL  Auto Neutrophil # : x  Auto Lymphocyte # : x  Auto Monocyte # : x  Auto Eosinophil # : x  Auto Basophil # : x  Auto Neutrophil % : x  Auto Lymphocyte % : x  Auto Monocyte % : x  Auto Eosinophil % : x  Auto Basophil % : x      06-26    124<L>  |  87<L>  |  7   ----------------------------<  94  3.3<L>   |  28  |  0.50    Ca    8.6      26 Jun 2017 09:05  Mg     1.8     06-26    TPro  6.6  /  Alb  3.0<L>  /  TBili  0.8  /  DBili  x   /  AST  28  /  ALT  19  /  AlkPhos  96  06-25    Hemoglobin A1C: Hemoglobin A1C, Whole Blood: 5.9 % (06-27 @ 06:07)      LIVER FUNCTIONS - ( 25 Jun 2017 21:05 )  Alb: 3.0 g/dL / Pro: 6.6 g/dL / ALK PHOS: 96 U/L / ALT: 19 U/L / AST: 28 U/L / GGT: x           Vitamin B12   PT/INR - ( 25 Jun 2017 21:05 )   PT: 11.7 sec;   INR: 1.07 ratio         PTT - ( 25 Jun 2017 21:05 )  PTT:26.6 sec      RADIOLOGY    ANALYSIS AND PLAN:    1.	An 87-year-old with a history of dementia and change in mental status, found on the ground.  2.	For episode of found on the ground, possible syncopal event, the patient at one point was hypotensive in the hospital setting versus possibly a mechanical fall.  3.	I would recommend to rule out cardiac etiology with elevated troponins.  4.	Telemetry evaluation to rule out underlying arrhythmia.  5.	For history of dementia secondary to the patient's age and she did try medications from the outside, which she did have side effects from, I would recommend supportive therapy.  6.	Changes in mental status most likely secondary to dementia made worse in the hospital setting.  7.	I would recommend Physical Therapy evaluation.  8.	Fall precautions.  9.	Spoke with daughter, Syl, at [phone] 6/27/17, she does understand that while in the hospital setting, the mother does become more disoriented and confused, sundowning.  10.	 neurologic wise stable     Thank you for the courtesy of this consultation.        Physical therapy evaluation.  OOB to chair/ambulation with assistance only.  Advanced care planning was discussed with family.  Pain is accessed and addressed.  Pt was screened for signs of clinical depression. Appropriate referral made.  Risk of falls accessed. Fall prevention and plan of care was discussed with family.  Pt is screened for tobacco and alcohol use. Counseling was done for smoking and alcohol cessation.  Use of narcotic pain meds was dicussed. Pt is advised to use narcotic meds wisely and to refrain from over using them.  Plan of care was discussed with family. Questions answered.  Would continue to follow.  30 minutes spent on total encounter; more than 50% of the visit was spent counseling and/or coordinating care by the attending physician.
Neurology follow up note    GISELA [id number]      Interval History:    Patient feels ok no new complaints.    MEDICATIONS    diVALproex Sprinkle 125 milliGRAM(s) Oral two times a day  mirtazapine 15 milliGRAM(s) Oral at bedtime  metoprolol succinate ER 25 milliGRAM(s) Oral daily  levothyroxine 88 MICROGram(s) Oral daily  folic acid 1 milliGRAM(s) Oral daily  acetaminophen   Tablet 650 milliGRAM(s) Oral every 6 hours PRN  senna 2 Tablet(s) Oral at bedtime  enoxaparin Injectable 40 milliGRAM(s) SubCutaneous daily  amLODIPine   Tablet 5 milliGRAM(s) Oral daily  LORazepam   Injectable 0.5 milliGRAM(s) IntraMuscular every 6 hours PRN  clopidogrel Tablet 75 milliGRAM(s) Oral daily  potassium chloride    Tablet ER 40 milliEquivalent(s) Oral every 6 hours  sodium chloride 2 Gram(s) Oral every 6 hours      Allergies    aspirin (Unknown)  mangoes (Unknown)  shellfish (Unknown)    Intolerances            Vital Signs Last 24 Hrs  T(C): 36.7 (28 Jun 2017 08:00), Max: 37.3 (27 Jun 2017 20:54)  T(F): 98.1 (28 Jun 2017 08:00), Max: 99.1 (27 Jun 2017 20:54)  HR: 79 (28 Jun 2017 12:00) (69 - 127)  BP: 129/77 (28 Jun 2017 12:00) (113/8 - 172/84)  BP(mean): --  RR: 16 (28 Jun 2017 12:00) (16 - 18)  SpO2: 98% (28 Jun 2017 12:00) (95% - 98%)      REVIEW OF SYSTEMS: Non Verbal  Limit or unable to obtain secondary to patient's poor mental status.    Constitutional: No fever, chills, fatigue, weakness  Eyes: no eye pain, visual disturbances, or discharge  ENT:  No difficulty hearing, tinnitus, vertigo; No sinus or throat pain  Neck: No pain or stiffness  Respiratory: No cough, dyspnea, wheezing   Cardiovascular: No chest pain, palpitations,   Gastrointestinal: No abdominal or epigastric pain. No nausea, vomiting  No diarrhea or constipation.   Genitourinary: No dysuria, frequency, hematuria or incontinence  Neurological: No headaches, lightheadedness, vertigo, numbness or tremors  Psychiatric: No depression, anxiety, mood swings or difficulty sleeping  Musculoskeletal: No joint pain or swelling; No muscle, back or extremity pain  Skin: No itching, burning, rashes or lesions   Lymph Nodes: No enlarged glands  Endocrine: No heat or cold intolerance; No hair loss   Allergy and Immunologic: No hives or eczema    EVIEW OF SYSTEMS: Non Verbal  Limit or unable to obtain secondary to patient's poor mental status.    Constitutional: No fever, chills, fatigue, weakness  Eyes: no eye pain, visual disturbances, or discharge  ENT:  No difficulty hearing, tinnitus, vertigo; No sinus or throat pain  Neck: No pain or stiffness  Respiratory: No cough, dyspnea, wheezing   Cardiovascular: No chest pain, palpitations,   Gastrointestinal: No abdominal or epigastric pain. No nausea, vomiting  No diarrhea or constipation.   Genitourinary: No dysuria, frequency, hematuria or incontinence  Neurological: No headaches, lightheadedness, vertigo, numbness or tremors  Psychiatric: No depression, anxiety, mood swings or difficulty sleeping  Musculoskeletal: No joint pain or swelling; No muscle, back or extremity pain  Skin: No itching, burning, rashes or lesions   Lymph Nodes: No enlarged glands  Endocrine: No heat or cold intolerance; No hair loss   Allergy and Immunologic: No hives or eczema    On Neurological Examination:    Mental Status - Patient is alert, awake,  Confused Dementia       Follow simple commands      Speech -   Fluent                Cranial Nerves - Pupils 3 mm equal and reactive to light,   extraocular eye movements intact.   smile symmetric  intact bilateral NLF    Motor Exam -     With stimuli positive movement of all 4 extremities    Muscle tone - is normal all over.  No asymmetry is seen.      Sensory    Bilateral intact to light touch      GENERAL Exam: Nontoxic , No Acute Distress   	  HEENT:  normocephalic, atraumatic  		  LUNGS: Clear bilaterally    	  HEART: Normal S1S2   No murmur RRR        	  GI/ ABDOMEN:  Soft  Non tender    EXTREMITIES:   No Edema  No Clubbing  No Cyanosis No Edema    MUSCULOSKELETAL: Normal Range of Motion  	   SKIN: Normal  No Ecchymosis                    LABS:  CBC Full  -  ( 28 Jun 2017 07:28 )  WBC Count : 9.6 K/uL  Hemoglobin : 15.3 g/dL  Hematocrit : 43.1 %  Platelet Count - Automated : 276 K/uL  Mean Cell Volume : 92.7 fl  Mean Cell Hemoglobin : 32.9 pg  Mean Cell Hemoglobin Concentration : 35.5 gm/dL  Auto Neutrophil # : x  Auto Lymphocyte # : x  Auto Monocyte # : x  Auto Eosinophil # : x  Auto Basophil # : x  Auto Neutrophil % : x  Auto Lymphocyte % : x  Auto Monocyte % : x  Auto Eosinophil % : x  Auto Basophil % : x      06-28    127<L>  |  90<L>  |  7   ----------------------------<  104<H>  3.1<L>   |  29  |  0.50    Ca    8.6      28 Jun 2017 07:28  Mg     1.9     06-28    TPro  7.0  /  Alb  3.1<L>  /  TBili  0.8  /  DBili  x   /  AST  37  /  ALT  28  /  AlkPhos  99  06-28    Hemoglobin A1C:     LIVER FUNCTIONS - ( 28 Jun 2017 07:28 )  Alb: 3.1 g/dL / Pro: 7.0 g/dL / ALK PHOS: 99 U/L / ALT: 28 U/L / AST: 37 U/L / GGT: x           Vitamin B12         RADIOLOGY    ANALYSIS AND PLAN:    1.	An 87-year-old with a history of dementia and change in mental status, found on the ground.  2.	For episode of found on the ground, possible syncopal event, the patient at one point was hypotensive in the hospital setting versus possibly a mechanical fall.  3.	I would recommend to rule out cardiac etiology with elevated troponins.  4.	Telemetry evaluation to rule out underlying arrhythmia.  5.	For history of dementia secondary to the patient's age and she did try medications from the outside, which she did have side effects from, I would recommend supportive therapy.  6.	Changes in mental status most likely secondary to dementia made worse in the hospital setting.  7.	I would recommend Physical Therapy evaluation.  8.	Fall precautions.  9.	Spoke with daughter, Syl, at [phone] 6/28/17, she does understand that while in the hospital setting, the mother does become more disoriented and confused, sundowning.  10.	 neurologic wise stable   11.	no new events     Thank you for the courtesy of this consultation.        Physical therapy evaluation.  OOB to chair/ambulation with assistance only.  Advanced care planning was discussed with family.  Pain is accessed and addressed.  Pt was screened for signs of clinical depression. Appropriate referral made.  Risk of falls accessed. Fall prevention and plan of care was discussed with family.  Pt is screened for tobacco and alcohol use. Counseling was done for smoking and alcohol cessation.  Use of narcotic pain meds was discussed Pt is advised to use narcotic meds wisely and to refrain from over using them.  Plan of care was discussed with family. Questions answered.  Would continue to follow.  30 minutes spent on total encounter; more than 50% of the visit was spent counseling and/or coordinating care by the attending physician.
Patient is a 87y old  Female who presents with a chief complaint of fainted (26 Jun 2017 12:27)      INTERVAL /OVERNIGHT EVENTS: less confused today    MEDICATIONS  (STANDING):  diVALproex Sprinkle 125 milliGRAM(s) Oral two times a day  mirtazapine 15 milliGRAM(s) Oral at bedtime  metoprolol succinate ER 25 milliGRAM(s) Oral daily  levothyroxine 88 MICROGram(s) Oral daily  folic acid 1 milliGRAM(s) Oral daily  senna 2 Tablet(s) Oral at bedtime  enoxaparin Injectable 40 milliGRAM(s) SubCutaneous daily  amLODIPine   Tablet 5 milliGRAM(s) Oral daily  clopidogrel Tablet 75 milliGRAM(s) Oral daily  potassium chloride    Tablet ER 40 milliEquivalent(s) Oral every 6 hours  sodium chloride 2 Gram(s) Oral every 6 hours    MEDICATIONS  (PRN):  acetaminophen   Tablet 650 milliGRAM(s) Oral every 6 hours PRN For Temp greater than 38 C (100.4 F)  LORazepam   Injectable 0.5 milliGRAM(s) IntraMuscular every 6 hours PRN Agitation      Allergies    aspirin (Unknown)  mangoes (Unknown)  shellfish (Unknown)    Intolerances        REVIEW OF SYSTEMS:  CONSTITUTIONAL: No fever, weight loss, or fatigue  EYES: No eye pain, visual disturbances, or discharge  ENMT:  No difficulty hearing, tinnitus, vertigo; No sinus or throat pain  NECK: No pain or stiffness  RESPIRATORY: No cough, wheezing, chills or hemoptysis; No shortness of breath  CARDIOVASCULAR: No chest pain, palpitations, dizziness, or leg swelling  GASTROINTESTINAL: No abdominal or epigastric pain. No nausea, vomiting, or hematemesis; No diarrhea or constipation. No melena or hematochezia.  GENITOURINARY: No dysuria, frequency, hematuria, or incontinence  NEUROLOGICAL: No headaches, memory loss, loss of strength, numbness, or tremors  SKIN: No itching, burning, rashes, or lesions   LYMPH NODES: No enlarged glands  ENDOCRINE: No heat or cold intolerance; No hair loss; No polydipsia or polyuria  MUSCULOSKELETAL: No joint pain or swelling; No muscle, back, or extremity pain  PSYCHIATRIC: No depression, anxiety, mood swings, or difficulty sleeping  HEME/LYMPH: No easy bruising, or bleeding gums  ALLERGY AND IMMUNOLOGIC: No hives or eczema    Vital Signs Last 24 Hrs  T(C): 36.3 (28 Jun 2017 15:49), Max: 37.3 (27 Jun 2017 20:54)  T(F): 97.3 (28 Jun 2017 15:49), Max: 99.1 (27 Jun 2017 20:54)  HR: 84 (28 Jun 2017 15:49) (69 - 94)  BP: 164/76 (28 Jun 2017 15:49) (113/8 - 164/76)  BP(mean): --  RR: 18 (28 Jun 2017 15:49) (16 - 18)  SpO2: 97% (28 Jun 2017 15:49) (95% - 98%)    PHYSICAL EXAM:  GENERAL: NAD, well-groomed, well-developed  HEAD:  Atraumatic, Normocephalic  EYES: EOMI, PERRLA, conjunctiva and sclera clear  ENMT: No tonsillar erythema, exudates, or enlargement; Moist mucous membranes, Good dentition, No lesions  NECK: Supple, No JVD, Normal thyroid  NERVOUS SYSTEM:  Alert & Oriented X3, Good concentration; Motor Strength 5/5 B/L upper and lower extremities; DTRs 2+ intact and symmetric  CHEST/LUNG: Clear to auscultation bilaterally; No rales, rhonchi, wheezing, or rubs  HEART: Regular rate and rhythm; No murmurs, rubs, or gallops  ABDOMEN: Soft, Nontender, Nondistended; Bowel sounds present  EXTREMITIES:  2+ Peripheral Pulses, No clubbing, cyanosis, or edema  LYMPH: No lymphadenopathy noted  SKIN: No rashes or lesions    LABS:                        15.3   9.6   )-----------( 276      ( 28 Jun 2017 07:28 )             43.1     28 Jun 2017 07:28    127    |  90     |  7      ----------------------------<  104    3.1     |  29     |  0.50     Ca    8.6        28 Jun 2017 07:28  Mg     1.9       28 Jun 2017 07:28    TPro  7.0    /  Alb  3.1    /  TBili  0.8    /  DBili  x      /  AST  37     /  ALT  28     /  AlkPhos  99     28 Jun 2017 07:28        CAPILLARY BLOOD GLUCOSE          RADIOLOGY & ADDITIONAL TESTS: < from: US Duplex Carotid Arteries Complete, Bilateral (06.26.17 @ 12:38) >  EXAM:  US DPLX CAROTIDS COMPL BI                            PROCEDURE DATE:  06/26/2017        INTERPRETATION:  History: TIA.    Bilateral carotid Doppler.  Nascet criteria applied.  Minimal intimal thickening bilateral common carotids. No subjective   luminal narrowing is appreciated. Peak systolic flow velocities in the   bilateral common external and internal carotids within normal limits.   Normal bilateral ICA/CCA velocity ratios. Antegrade flow vertebrals.    Impression: No flow-limiting stenosis        < end of copied text >      Notes Reviewed:  [x ] YES  [ ] NO    Care Discussed with Consultants/Other Providers [x ] YES  [ ] NO
Patient is a 87y old  Female who presents with a chief complaint of fainted (26 Jun 2017 12:27)      INTERVAL /OVERNIGHT EVENTS: now in retention of urine, non compliant with HARO    MEDICATIONS  (STANDING):  diVALproex Sprinkle 125 milliGRAM(s) Oral two times a day  mirtazapine 15 milliGRAM(s) Oral at bedtime  metoprolol succinate ER 25 milliGRAM(s) Oral daily  levothyroxine 88 MICROGram(s) Oral daily  folic acid 1 milliGRAM(s) Oral daily  senna 2 Tablet(s) Oral at bedtime  enoxaparin Injectable 40 milliGRAM(s) SubCutaneous daily  clopidogrel Tablet 75 milliGRAM(s) Oral daily  tamsulosin 0.4 milliGRAM(s) Oral at bedtime  docusate sodium 100 milliGRAM(s) Oral daily  amLODIPine   Tablet 2.5 milliGRAM(s) Oral two times a day    MEDICATIONS  (PRN):  acetaminophen   Tablet 650 milliGRAM(s) Oral every 6 hours PRN For Temp greater than 38 C (100.4 F)  LORazepam   Injectable 0.5 milliGRAM(s) IntraMuscular every 6 hours PRN Agitation  magnesium hydroxide Suspension 30 milliLiter(s) Oral daily PRN Constipation      Allergies    aspirin (Unknown)  mangoes (Unknown)  shellfish (Unknown)    Intolerances        REVIEW OF SYSTEMS:  CONSTITUTIONAL: No fever, weight loss, or fatigue  EYES: No eye pain, visual disturbances, or discharge  ENMT:  No difficulty hearing, tinnitus, vertigo; No sinus or throat pain  NECK: No pain or stiffness  RESPIRATORY: No cough, wheezing, chills or hemoptysis; No shortness of breath  CARDIOVASCULAR: No chest pain, palpitations, dizziness, or leg swelling  GASTROINTESTINAL: No abdominal or epigastric pain. No nausea, vomiting, or hematemesis; No diarrhea or constipation. No melena or hematochezia.  GENITOURINARY: No dysuria, frequency, hematuria, or incontinence  NEUROLOGICAL: No headaches, memory loss, loss of strength, numbness, or tremors  SKIN: No itching, burning, rashes, or lesions   LYMPH NODES: No enlarged glands  ENDOCRINE: No heat or cold intolerance; No hair loss; No polydipsia or polyuria  MUSCULOSKELETAL: No joint pain or swelling; No muscle, back, or extremity pain  PSYCHIATRIC: No depression, anxiety, mood swings, or difficulty sleeping  HEME/LYMPH: No easy bruising, or bleeding gums  ALLERGY AND IMMUNOLOGIC: No hives or eczema    Vital Signs Last 24 Hrs  T(C): 36.3 (30 Jun 2017 15:31), Max: 36.7 (29 Jun 2017 23:30)  T(F): 97.3 (30 Jun 2017 15:31), Max: 98 (29 Jun 2017 23:30)  HR: 71 (30 Jun 2017 15:31) (61 - 80)  BP: 138/80 (30 Jun 2017 15:31) (104/57 - 138/80)  BP(mean): --  RR: 18 (30 Jun 2017 15:31) (17 - 19)  SpO2: 96% (30 Jun 2017 15:31) (93% - 98%)    PHYSICAL EXAM:  GENERAL: NAD, well-groomed, well-developed  HEAD:  Atraumatic, Normocephalic  EYES: EOMI, PERRLA, conjunctiva and sclera clear  ENMT: No tonsillar erythema, exudates, or enlargement; Moist mucous membranes, Good dentition, No lesions  NECK: Supple, No JVD, Normal thyroid  NERVOUS SYSTEM:  Alert & Oriented X3, Good concentration; Motor Strength 5/5 B/L upper and lower extremities; DTRs 2+ intact and symmetric  CHEST/LUNG: Clear to auscultation bilaterally; No rales, rhonchi, wheezing, or rubs  HEART: Regular rate and rhythm; No murmurs, rubs, or gallops  ABDOMEN: Soft, Nontender, Nondistended; Bowel sounds present  EXTREMITIES:  2+ Peripheral Pulses, No clubbing, cyanosis, or edema  LYMPH: No lymphadenopathy noted  SKIN: No rashes or lesions    LABS:    30 Jun 2017 06:40    133    |  96     |  13     ----------------------------<  87     4.4     |  28     |  0.75     Ca    9.1        30 Jun 2017 06:40          CAPILLARY BLOOD GLUCOSE          RADIOLOGY & ADDITIONAL TESTS: < from: US Urinary Bladder (06.29.17 @ 13:01) >  EXAM:  US URINARY BLADDER                            PROCEDURE DATE:  06/29/2017        INTERPRETATION:  Clinical indication: Urinary retention. Evaluate   postvoid residual.    Technique: Pre and post void sonographic images of the urinary bladder  were acquired.    Findings: The urinary bladder has a prevoid volume of 156 cc. No bladder   wall thickening is seen.    Patient was unable to void.    Ureteral jets were not visualized bilaterally.    Impression:    Unable to calculate a postvoid residual as the patient was unable to void.      < end of copied text >      Notes Reviewed:  [x ] YES  [ ] NO    Care Discussed with Consultants/Other Providers [x ] YES  [ ] NO
Patient is a 87y old  Female who presents with a chief complaint of fainted (26 Jun 2017 12:27)      INTERVAL /OVERNIGHT EVENTS: per RN periods of confusion and agitation    MEDICATIONS  (STANDING):  diVALproex Sprinkle 125 milliGRAM(s) Oral two times a day  mirtazapine 15 milliGRAM(s) Oral at bedtime  metoprolol succinate ER 25 milliGRAM(s) Oral daily  levothyroxine 88 MICROGram(s) Oral daily  folic acid 1 milliGRAM(s) Oral daily  senna 2 Tablet(s) Oral at bedtime  enoxaparin Injectable 40 milliGRAM(s) SubCutaneous daily  clopidogrel Tablet 75 milliGRAM(s) Oral daily  amLODIPine   Tablet 5 milliGRAM(s) Oral daily    MEDICATIONS  (PRN):  acetaminophen   Tablet 650 milliGRAM(s) Oral every 6 hours PRN For Temp greater than 38 C (100.4 F)  acetaminophen   Tablet. 650 milliGRAM(s) Oral every 6 hours PRN Mild Pain (1 - 3)  haloperidol    Injectable 1 milliGRAM(s) IntraMuscular every 6 hours PRN Agitation  LORazepam   Injectable 0.5 milliGRAM(s) IntraMuscular every 6 hours PRN Agitation      Allergies    aspirin (Unknown)  mangoes (Unknown)  shellfish (Unknown)    Intolerances        REVIEW OF SYSTEMS:  CONSTITUTIONAL: No fever, weight loss, or fatigue  EYES: No eye pain, visual disturbances, or discharge  ENMT:  No difficulty hearing, tinnitus, vertigo; No sinus or throat pain  NECK: No pain or stiffness  RESPIRATORY: No cough, wheezing, chills or hemoptysis; No shortness of breath  CARDIOVASCULAR: No chest pain, palpitations, dizziness, or leg swelling  GASTROINTESTINAL: No abdominal or epigastric pain. No nausea, vomiting, or hematemesis; No diarrhea or constipation. No melena or hematochezia.  GENITOURINARY: No dysuria, frequency, hematuria, or incontinence  NEUROLOGICAL: No headaches, memory loss, loss of strength, numbness, or tremors  SKIN: No itching, burning, rashes, or lesions   LYMPH NODES: No enlarged glands  ENDOCRINE: No heat or cold intolerance; No hair loss; No polydipsia or polyuria  MUSCULOSKELETAL: No joint pain or swelling; No muscle, back, or extremity pain  PSYCHIATRIC: No depression, anxiety, mood swings, or difficulty sleeping  HEME/LYMPH: No easy bruising, or bleeding gums  ALLERGY AND IMMUNOLOGIC: No hives or eczema    Vital Signs Last 24 Hrs  T(C): 37.3 (27 Jun 2017 20:54), Max: 37.3 (27 Jun 2017 20:54)  T(F): 99.1 (27 Jun 2017 20:54), Max: 99.1 (27 Jun 2017 20:54)  HR: 94 (27 Jun 2017 20:54) (60 - 127)  BP: 113/8 (27 Jun 2017 20:54) (113/8 - 172/84)  BP(mean): --  RR: 18 (27 Jun 2017 20:54) (17 - 18)  SpO2: 96% (27 Jun 2017 20:54) (93% - 97%)    PHYSICAL EXAM:  GENERAL: NAD, well-groomed, well-developed  HEAD:  Atraumatic, Normocephalic  EYES: EOMI, PERRLA, conjunctiva and sclera clear  ENMT: No tonsillar erythema, exudates, or enlargement; Moist mucous membranes, Good dentition, No lesions  NECK: Supple, No JVD, Normal thyroid  NERVOUS SYSTEM:  Alert  CHEST/LUNG: Clear to auscultation bilaterally; No rales, rhonchi, wheezing, or rubs  HEART: Regular rate and rhythm; No murmurs, rubs, or gallops  ABDOMEN: Soft, Nontender, Nondistended; Bowel sounds present  EXTREMITIES:  2+ Peripheral Pulses, No clubbing, cyanosis, or edema  LYMPH: No lymphadenopathy noted  SKIN: No rashes or lesions    LABS:    27 Jun 2017 19:15    124    |  89     |  8      ----------------------------<  143    4.1     |  27     |  0.78     Ca    8.4        27 Jun 2017 19:15          CAPILLARY BLOOD GLUCOSE          RADIOLOGY & ADDITIONAL TESTS: < from: US Duplex Carotid Arteries Complete, Bilateral (06.26.17 @ 12:38) >  EXAM:  US DPLX CAROTIDS COMPL BI                            PROCEDURE DATE:  06/26/2017        INTERPRETATION:  History: TIA.    Bilateral carotid Doppler.  Nascet criteria applied.  Minimal intimal thickening bilateral common carotids. No subjective   luminal narrowing is appreciated. Peak systolic flow velocities in the   bilateral common external and internal carotids within normal limits.   Normal bilateral ICA/CCA velocity ratios. Antegrade flow vertebrals.    Impression: No flow-limiting stenosis      < end of copied text >      Notes Reviewed:  [x ] YES  [ ] NO    Care Discussed with Consultants/Other Providers [x ] YES  [ ] NO
Patient is a 87y old  Female who presents with a chief complaint of fainted (26 Jun 2017 12:27)      INTERVAL /OVERNIGHT EVENTS: trouble urinating, still hyponatremic    MEDICATIONS  (STANDING):  diVALproex Sprinkle 125 milliGRAM(s) Oral two times a day  mirtazapine 15 milliGRAM(s) Oral at bedtime  metoprolol succinate ER 25 milliGRAM(s) Oral daily  levothyroxine 88 MICROGram(s) Oral daily  folic acid 1 milliGRAM(s) Oral daily  senna 2 Tablet(s) Oral at bedtime  enoxaparin Injectable 40 milliGRAM(s) SubCutaneous daily  amLODIPine   Tablet 5 milliGRAM(s) Oral daily  clopidogrel Tablet 75 milliGRAM(s) Oral daily    MEDICATIONS  (PRN):  acetaminophen   Tablet 650 milliGRAM(s) Oral every 6 hours PRN For Temp greater than 38 C (100.4 F)  LORazepam   Injectable 0.5 milliGRAM(s) IntraMuscular every 6 hours PRN Agitation      Allergies    aspirin (Unknown)  mangoes (Unknown)  shellfish (Unknown)    Intolerances        REVIEW OF SYSTEMS:  CONSTITUTIONAL: No fever, weight loss, or fatigue  EYES: No eye pain, visual disturbances, or discharge  ENMT:  No difficulty hearing, tinnitus, vertigo; No sinus or throat pain  NECK: No pain or stiffness  RESPIRATORY: No cough, wheezing, chills or hemoptysis; No shortness of breath  CARDIOVASCULAR: No chest pain, palpitations, dizziness, or leg swelling  GASTROINTESTINAL: No abdominal or epigastric pain. No nausea, vomiting, or hematemesis; No diarrhea or constipation. No melena or hematochezia.  GENITOURINARY: No dysuria, frequency, hematuria, or incontinence  NEUROLOGICAL: No headaches, memory loss, loss of strength, numbness, or tremors  SKIN: No itching, burning, rashes, or lesions   LYMPH NODES: No enlarged glands  ENDOCRINE: No heat or cold intolerance; No hair loss; No polydipsia or polyuria  MUSCULOSKELETAL: No joint pain or swelling; No muscle, back, or extremity pain  PSYCHIATRIC: No depression, anxiety, mood swings, or difficulty sleeping  HEME/LYMPH: No easy bruising, or bleeding gums  ALLERGY AND IMMUNOLOGIC: No hives or eczema    Vital Signs Last 24 Hrs  T(C): 36.6 (29 Jun 2017 12:03), Max: 36.7 (28 Jun 2017 20:29)  T(F): 97.8 (29 Jun 2017 12:03), Max: 98.1 (28 Jun 2017 20:29)  HR: 76 (29 Jun 2017 12:03) (71 - 89)  BP: 158/74 (29 Jun 2017 12:03) (128/75 - 164/76)  BP(mean): --  RR: 18 (29 Jun 2017 12:03) (12 - 19)  SpO2: 95% (29 Jun 2017 12:03) (95% - 98%)    PHYSICAL EXAM:  GENERAL: NAD, well-groomed, well-developed  HEAD:  Atraumatic, Normocephalic  EYES: EOMI, PERRLA, conjunctiva and sclera clear  ENMT: No tonsillar erythema, exudates, or enlargement; Moist mucous membranes, Good dentition, No lesions  NECK: Supple, No JVD, Normal thyroid  NERVOUS SYSTEM:  Alert & Oriented X3, Good concentration; Motor Strength 5/5 B/L upper and lower extremities; DTRs 2+ intact and symmetric  CHEST/LUNG: Clear to auscultation bilaterally; No rales, rhonchi, wheezing, or rubs  HEART: Regular rate and rhythm; No murmurs, rubs, or gallops  ABDOMEN: Soft, Nontender, Nondistended; Bowel sounds present  EXTREMITIES:  2+ Peripheral Pulses, No clubbing, cyanosis, or edema  LYMPH: No lymphadenopathy noted  SKIN: No rashes or lesions    LABS:    29 Jun 2017 05:46    122    |  88     |  11     ----------------------------<  100    4.5     |  26     |  0.65     Ca    8.5        29 Jun 2017 05:46          CAPILLARY BLOOD GLUCOSE          RADIOLOGY & ADDITIONAL TESTS: < from: US Urinary Bladder (06.29.17 @ 13:01) >  EXAM:  US URINARY BLADDER                            PROCEDURE DATE:  06/29/2017        INTERPRETATION:  Clinical indication: Urinary retention. Evaluate   postvoid residual.    Technique: Pre and post void sonographic images of the urinary bladder  were acquired.    Findings: The urinary bladder has a prevoid volume of 156 cc. No bladder   wall thickening is seen.    Patient was unable to void.    Ureteral jets were not visualized bilaterally.    Impression:    Unable to calculate a postvoid residual as the patient was unable to void.    < end of copied text >      Notes Reviewed:  [x ] YES  [ ] NO    Care Discussed with Consultants/Other Providers [x ] YES  [ ] NO
Patient is a 87y old  Female who presents with a chief complaint of fainted (26 Jun 2017 12:27)      Patient seen in follow up for hyponatremia. Improved sodium levels. [place] insereted for urinary retention was pulled out by pt. Seen by RUDDY.     MEDICATIONS  (STANDING):  diVALproex Sprinkle 125 milliGRAM(s) Oral two times a day  mirtazapine 15 milliGRAM(s) Oral at bedtime  metoprolol succinate ER 25 milliGRAM(s) Oral daily  levothyroxine 88 MICROGram(s) Oral daily  folic acid 1 milliGRAM(s) Oral daily  senna 2 Tablet(s) Oral at bedtime  enoxaparin Injectable 40 milliGRAM(s) SubCutaneous daily  clopidogrel Tablet 75 milliGRAM(s) Oral daily  tamsulosin 0.4 milliGRAM(s) Oral at bedtime  docusate sodium 100 milliGRAM(s) Oral daily  amLODIPine   Tablet 2.5 milliGRAM(s) Oral two times a day    MEDICATIONS  (PRN):  acetaminophen   Tablet 650 milliGRAM(s) Oral every 6 hours PRN For Temp greater than 38 C (100.4 F)  LORazepam   Injectable 0.5 milliGRAM(s) IntraMuscular every 6 hours PRN Agitation  magnesium hydroxide Suspension 30 milliLiter(s) Oral daily PRN Constipation    T(C): 36.4 (06-30-17 @ 08:10), Max: 36.7 (06-28-17 @ 20:29)  HR: 61 (06-30-17 @ 08:10) (61 - 89)  BP: 118/56 (06-30-17 @ 08:10) (105/66 - 164/76)  RR: 17 (06-30-17 @ 08:10)  SpO2: 98% (06-30-17 @ 08:10)  Wt(kg): --  I&O's Detail    29 Jun 2017 07:01  -  30 Jun 2017 07:00  --------------------------------------------------------  IN:  Total IN: 0 mL    OUT:    Intermittent Catheterization - Urethral: 550 mL  Total OUT: 550 mL    Total NET: -550 mL              PHYSICAL EXAM:  General: NAD  Respiratory: b/l air entry  Cardiovascular: S1 S2  Gastrointestinal: soft  Extremities:  no edema    LABORATORY:    06-30    133<L>  |  96  |  13  ----------------------------<  87  4.4   |  28  |  0.75    Ca    9.1      30 Jun 2017 06:40      Sodium, Serum: 133 mmol/L (06-30 @ 06:40)  Sodium, Serum: 122 mmol/L (06-29 @ 05:46)    Potassium, Serum: 4.4 mmol/L (06-30 @ 06:40)  Potassium, Serum: 4.5 mmol/L (06-29 @ 05:46)    Hemoglobin: 15.3 g/dL (06-28 @ 07:28)    Creatinine, Serum 0.75 (06-30 @ 06:40)  Creatinine, Serum 0.65 (06-29 @ 05:46)  Creatinine, Serum 0.50 (06-28 @ 07:28)  Creatinine, Serum 0.78 (06-27 @ 19:15)
Patient is a 87y old  Female who presents with a chief complaint of fainted (26 Jun 2017 12:27)      Patient seen in follow up for hyponatremia. Improving sodium levels.     MEDICATIONS  (STANDING):  diVALproex Sprinkle 125 milliGRAM(s) Oral two times a day  mirtazapine 15 milliGRAM(s) Oral at bedtime  metoprolol succinate ER 25 milliGRAM(s) Oral daily  levothyroxine 88 MICROGram(s) Oral daily  folic acid 1 milliGRAM(s) Oral daily  senna 2 Tablet(s) Oral at bedtime  enoxaparin Injectable 40 milliGRAM(s) SubCutaneous daily  amLODIPine   Tablet 5 milliGRAM(s) Oral daily  clopidogrel Tablet 75 milliGRAM(s) Oral daily    MEDICATIONS  (PRN):  acetaminophen   Tablet 650 milliGRAM(s) Oral every 6 hours PRN For Temp greater than 38 C (100.4 F)  LORazepam   Injectable 0.5 milliGRAM(s) IntraMuscular every 6 hours PRN Agitation    T(C): 36.7 (06-28-17 @ 08:00), Max: 37.3 (06-27-17 @ 20:54)  HR: 79 (06-28-17 @ 12:00) (60 - 127)  BP: 129/77 (06-28-17 @ 12:00) (113/8 - 172/84)  RR: 16 (06-28-17 @ 12:00)  SpO2: 98% (06-28-17 @ 12:00)  Wt(kg): --  I&O's Detail      PHYSICAL EXAM:  General: NAD  Respiratory: b/l air entry  Cardiovascular: S1 S2  Gastrointestinal: soft  Extremities:  no edema    LABORATORY:                        15.3   9.6   )-----------( 276      ( 28 Jun 2017 07:28 )             43.1     06-28    127<L>  |  90<L>  |  7   ----------------------------<  104<H>  3.1<L>   |  29  |  0.50    Ca    8.6      28 Jun 2017 07:28  Mg     1.9     06-28    TPro  7.0  /  Alb  3.1<L>  /  TBili  0.8  /  DBili  x   /  AST  37  /  ALT  28  /  AlkPhos  99  06-28    Sodium, Serum: 127 mmol/L (06-28 @ 07:28)  Sodium, Serum: 124 mmol/L (06-27 @ 19:15)    Potassium, Serum: 3.1 mmol/L (06-28 @ 07:28)  Potassium, Serum: 4.1 mmol/L (06-27 @ 19:15)    Hemoglobin: 15.3 g/dL (06-28 @ 07:28)  Hemoglobin: 14.1 g/dL (06-26 @ 09:05)  Hemoglobin: 12.8 g/dL (06-25 @ 21:05)    Creatinine, Serum 0.50 (06-28 @ 07:28)  Creatinine, Serum 0.78 (06-27 @ 19:15)  Creatinine, Serum 0.50 (06-26 @ 09:05)  Creatinine, Serum 0.64 (06-25 @ 21:05)    CARDIAC MARKERS ( 26 Jun 2017 21:11 )  .800 ng/mL / x     / 540 U/L / x     / x      CARDIAC MARKERS ( 26 Jun 2017 14:05 )  1.200 ng/mL / x     / 741 U/L / x     / x          LIVER FUNCTIONS - ( 28 Jun 2017 07:28 )  Alb: 3.1 g/dL / Pro: 7.0 g/dL / ALK PHOS: 99 U/L / ALT: 28 U/L / AST: 37 U/L / GGT: x
Patient is a 87y old  Female who presents with a chief complaint of fainted (26 Jun 2017 12:27)      Patient seen in follow up for hyponatremia. Low sodium despite salt tabs.     MEDICATIONS  (STANDING):  diVALproex Sprinkle 125 milliGRAM(s) Oral two times a day  mirtazapine 15 milliGRAM(s) Oral at bedtime  metoprolol succinate ER 25 milliGRAM(s) Oral daily  levothyroxine 88 MICROGram(s) Oral daily  folic acid 1 milliGRAM(s) Oral daily  senna 2 Tablet(s) Oral at bedtime  enoxaparin Injectable 40 milliGRAM(s) SubCutaneous daily  amLODIPine   Tablet 5 milliGRAM(s) Oral daily  clopidogrel Tablet 75 milliGRAM(s) Oral daily  tolvaptan 15 milliGRAM(s) Oral once    MEDICATIONS  (PRN):  acetaminophen   Tablet 650 milliGRAM(s) Oral every 6 hours PRN For Temp greater than 38 C (100.4 F)  LORazepam   Injectable 0.5 milliGRAM(s) IntraMuscular every 6 hours PRN Agitation    T(C): 36.4 (06-29-17 @ 07:53), Max: 37.3 (06-27-17 @ 20:54)  HR: 71 (06-29-17 @ 07:53) (69 - 127)  BP: 128/75 (06-29-17 @ 07:53) (113/8 - 172/84)  RR: 17 (06-29-17 @ 07:53)  SpO2: 96% (06-29-17 @ 07:53)  Wt(kg): --  I&O's Detail    28 Jun 2017 07:01  -  29 Jun 2017 07:00  --------------------------------------------------------  IN:    Oral Fluid: 360 mL  Total IN: 360 mL    OUT:  Total OUT: 0 mL    Total NET: 360 mL          PHYSICAL EXAM:  General: NAD  Respiratory: b/l air entry  Cardiovascular: S1 S2  Gastrointestinal: soft  Extremities:  no edema    LABORATORY:                        15.3   9.6   )-----------( 276      ( 28 Jun 2017 07:28 )             43.1     06-29    122<L>  |  88<L>  |  11  ----------------------------<  100<H>  4.5   |  26  |  0.65    Ca    8.5      29 Jun 2017 05:46  Mg     1.9     06-28    TPro  7.0  /  Alb  3.1<L>  /  TBili  0.8  /  DBili  x   /  AST  37  /  ALT  28  /  AlkPhos  99  06-28    Sodium, Serum: 122 mmol/L (06-29 @ 05:46)  Sodium, Serum: 127 mmol/L (06-28 @ 07:28)  Sodium, Serum: 124 mmol/L (06-27 @ 19:15)    Potassium, Serum: 4.5 mmol/L (06-29 @ 05:46)  Potassium, Serum: 3.1 mmol/L (06-28 @ 07:28)  Potassium, Serum: 4.1 mmol/L (06-27 @ 19:15)    Hemoglobin: 15.3 g/dL (06-28 @ 07:28)    Creatinine, Serum 0.65 (06-29 @ 05:46)  Creatinine, Serum 0.50 (06-28 @ 07:28)  Creatinine, Serum 0.78 (06-27 @ 19:15)        LIVER FUNCTIONS - ( 28 Jun 2017 07:28 )  Alb: 3.1 g/dL / Pro: 7.0 g/dL / ALK PHOS: 99 U/L / ALT: 28 U/L / AST: 37 U/L / GGT: x
Patient is a 87y old  Female who presents with a chief complaint of fainted (26 Jun 2017 12:27)    Event	  HPI:  This is an 86 yo female with hx of dementia on dementia unit at [facility], HTN, HLD per ems, they were called for patient with left hemiplegia. Pt on their arrival had a [place] score of zero with no deficits. Pt unable to recall what happened. Pt glucose 138. Pt denies any pain but states she felt "faint" earlier. She denies CP, n/v/d, palpitations, SOB, cough, fever. (26 Jun 2017 10:29)    MEDICATIONS  (STANDING):  diVALproex Sprinkle 125 milliGRAM(s) Oral two times a day  mirtazapine 15 milliGRAM(s) Oral at bedtime  metoprolol succinate ER 25 milliGRAM(s) Oral daily  levothyroxine 88 MICROGram(s) Oral daily  folic acid 1 milliGRAM(s) Oral daily  senna 2 Tablet(s) Oral at bedtime  enoxaparin Injectable 40 milliGRAM(s) SubCutaneous daily  amLODIPine   Tablet 5 milliGRAM(s) Oral daily  clopidogrel Tablet 75 milliGRAM(s) Oral daily  tolvaptan 15 milliGRAM(s) Oral once    MEDICATIONS  (PRN):  acetaminophen   Tablet 650 milliGRAM(s) Oral every 6 hours PRN For Temp greater than 38 C (100.4 F)  LORazepam   Injectable 0.5 milliGRAM(s) IntraMuscular every 6 hours PRN Agitation      REVIEW OF SYSTEMS:  Otherwise, 10 point ROS done and otherwise negative.    PHYSICAL EXAM:  Vital Signs Last 24 Hrs  T(C): 36.4 (29 Jun 2017 07:53), Max: 36.7 (28 Jun 2017 20:29)  T(F): 97.6 (29 Jun 2017 07:53), Max: 98.1 (28 Jun 2017 20:29)  HR: 71 (29 Jun 2017 07:53) (71 - 89)  BP: 128/75 (29 Jun 2017 07:53) (128/75 - 164/76)  BP(mean): --  RR: 17 (29 Jun 2017 07:53) (12 - 19)  SpO2: 96% (29 Jun 2017 07:53) (96% - 98%)  I&O's Summary    28 Jun 2017 07:01  -  29 Jun 2017 07:00  --------------------------------------------------------  IN: 360 mL / OUT: 0 mL / NET: 360 mL        Appearance: Normal	  HEENT:   Normal oral mucosa, PERRL, EOMI	  Lymphatic: No lymphadenopathy  Cardiovascular: Normal S1 S2, No JVD, II/VI AS murmurs, No edema  Respiratory: Lungs clear to auscultation	  Psychiatry: A & O x 3, Mood & affect appropriate  Gastrointestinal:  Soft, Non-tender, + BS	  Skin: No rashes, No ecchymoses, No cyanosis	  Neurologic: Non-focal  Extremities: Normal range of motion, No clubbing, cyanosis or edema  Vascular: Peripheral pulses palpable 2+ bilaterally    LABS:	 	                        15.3   9.6   )-----------( 276      ( 28 Jun 2017 07:28 )             43.1     Auto Eosinophil # x     / Auto Eosinophil % x     / Auto Neutrophil # x     / Auto Neutrophil % x     / BANDS % x        INR: 1.07 ratio (06-25 @ 21:05)    06-29    122<L>  |  88<L>  |  11  ----------------------------<  100<H>  4.5   |  26  |  0.65  06-28    127<L>  |  90<L>  |  7   ----------------------------<  104<H>  3.1<L>   |  29  |  0.50  06-27    124<L>  |  89<L>  |  8   ----------------------------<  143<H>  4.1   |  27  |  0.78    Ca    8.5      29 Jun 2017 05:46  Mg     1.9     06-28  TPro  7.0  /  Alb  3.1<L>  /  TBili  0.8  /  DBili  x   /  AST  37  /  ALT  28  /  AlkPhos  99  06-28        proBNP:   Lipid Profile: 06-26 Chol 161 LDL 61 HDL 83 Trig 85  HgA1c: 5.9 % (06-27 @ 06:07)    TSH: Thyroid Stimulating Hormone, Serum: 0.35 uIU/mL (06-28 @ 07:28)      CARDIAC MARKERS:   26 Jun 2017 21:11 Troponin x     / Creatine Kinase 540 U/L /  CKMB x     / CPK Mass Assay % x       26 Jun 2017 14:05 Troponin x     / Creatine Kinase 741 U/L /  CKMB x     / CPK Mass Assay % x       26 Jun 2017 09:05 Troponin x     / Creatine Kinase 897 U/L /  CKMB x     / CPK Mass Assay % x            TELEMETRY: 	    ECG:  	  RADIOLOGY:  OTHER: 	    PREVIOUS DIAGNOSTIC TESTING:    [ ] Echocardiogram:  MEASUREMENTS  IVS: 1.1cm  PWT: 1.1cm  LA: 3.2cm  AO: 3.9cm  LVIDd: 4.5cm  LVIDs: 2.5cm  LVOT:    cm    LVEF: 60%  RVSP: 27mm/Hg  RA Pressure: 10mm/Hg  IVC:    cm    FINDINGS  Left Ventricle: Normal left ventricular size, wall thickness, and global   systolic function  Right Ventricle: Normal  Left Atrium: Normal  Right Atrium: Normal  Mitral Valve: Mild thickening of the mitral valve leaflets with mild to   moderate mitral regurgitation  Aortic Valve: Aortic sclerosis with mild to moderate aortic regurgitation  Tricuspid Valve: Mild tricuspid regurgitation  Pulmonic Valve: Mild pulmonic regurgitation  Diastolic Function: Normal  Pericardium/Pleura: No significant effusions
[facility] -- Ioana Zamornao Grossman, [place], Davian Demarco      Follow Up:  NSTEMI    Subjective/Observations: Patient seen and examined. Events noted. Resting comfortably in chair. No complaints of chest pain, dyspnea, or palpitations reported.       REVIEW OF SYSTEMS: All other review of systems is negative unless indicated above    PAST MEDICAL & SURGICAL HISTORY:  Constipation  Asthma  Hyperlipidemia  Hypertension  Anxiety disorder  Delusional disorder  Dementia  No significant past surgical history      MEDICATIONS  (STANDING):  diVALproex Sprinkle 125 milliGRAM(s) Oral two times a day  mirtazapine 15 milliGRAM(s) Oral at bedtime  metoprolol succinate ER 25 milliGRAM(s) Oral daily  levothyroxine 88 MICROGram(s) Oral daily  folic acid 1 milliGRAM(s) Oral daily  senna 2 Tablet(s) Oral at bedtime  enoxaparin Injectable 40 milliGRAM(s) SubCutaneous daily  clopidogrel Tablet 75 milliGRAM(s) Oral daily  tamsulosin 0.4 milliGRAM(s) Oral at bedtime  docusate sodium 100 milliGRAM(s) Oral daily  amLODIPine   Tablet 2.5 milliGRAM(s) Oral two times a day    MEDICATIONS  (PRN):  acetaminophen   Tablet 650 milliGRAM(s) Oral every 6 hours PRN For Temp greater than 38 C (100.4 F)  LORazepam   Injectable 0.5 milliGRAM(s) IntraMuscular every 6 hours PRN Agitation  magnesium hydroxide Suspension 30 milliLiter(s) Oral daily PRN Constipation      Allergies    aspirin (Unknown)  mangoes (Unknown)  shellfish (Unknown)    Intolerances            Vital Signs Last 24 Hrs  T(C): 36.3 (30 Jun 2017 15:31), Max: 36.7 (29 Jun 2017 23:30)  T(F): 97.3 (30 Jun 2017 15:31), Max: 98 (29 Jun 2017 23:30)  HR: 71 (30 Jun 2017 15:31) (61 - 80)  BP: 138/80 (30 Jun 2017 15:31) (104/57 - 138/80)  BP(mean): --  RR: 18 (30 Jun 2017 15:31) (17 - 19)  SpO2: 96% (30 Jun 2017 15:31) (93% - 98%)    I&O's Summary    29 Jun 2017 07:01  -  30 Jun 2017 07:00  --------------------------------------------------------  IN: 0 mL / OUT: 550 mL / NET: -550 mL          PHYSICAL EXAM:  TELE: off  Constitutional: NAD, awake and alert, well-developed  HEENT: Moist Mucous Membranes, Anicteric  Pulmonary: Decreased breath sounds b/l.   Cardiovascular: Regular, S1 and S2, 2/6 SM  Gastrointestinal: Bowel Sounds present, soft, nontender.   Lymph: No peripheral edema. No lymphadenopathy.  Skin: No visible rashes or ulcers.  Psych:  flat affect     LABS: All Labs Reviewed:                        15.3   9.6   )-----------( 276      ( 28 Jun 2017 07:28 )             43.1     30 Jun 2017 06:40    133    |  96     |  13     ----------------------------<  87     4.4     |  28     |  0.75   29 Jun 2017 05:46    122    |  88     |  11     ----------------------------<  100    4.5     |  26     |  0.65   28 Jun 2017 07:28    127    |  90     |  7      ----------------------------<  104    3.1     |  29     |  0.50     Ca    9.1        30 Jun 2017 06:40  Ca    8.5        29 Jun 2017 05:46  Ca    8.6        28 Jun 2017 07:28  Mg     1.9       28 Jun 2017 07:28    TPro  7.0    /  Alb  3.1    /  TBili  0.8    /  DBili  x      /  AST  37     /  ALT  28     /  AlkPhos  99     28 Jun 2017 07:28

## 2017-07-01 NOTE — PROGRESS NOTE ADULT - ASSESSMENT
Sanaz is hemodynamically stable in sinus rhythm with no evidence of heart failure. Her elevated troponin level does not likely represent primary atherothrombosis although she may have had a component of demand ischemia. Conservative therapy is warranted. She has normal left ventricular function by echo with no significant valvular disease  - Continue  clopidogrel 2/2 ASA allergy  - Continue amlodipine for hypertension  - Continue metoprolol  - Monitor and replete electrolytes. Keep K>4.0 and Mg>2.0.   -  eval  - Further cardiac workup will depend on clinical course.   - All other workup per primary team. Will followup.

## 2017-07-01 NOTE — PROGRESS NOTE ADULT - ASSESSMENT
Incomplete bladder emptying with normal renal function and incontinence.  D/W daughter Syl, made aware options including SPT/sedating and restraining patient with [place] or SPY, CIC/observation,   She requests observation and will follow as outpatient Daughter aware risks and complications of observation questions answered.  Will follow as out patient.

## 2017-07-02 LAB
CULTURE RESULTS: SIGNIFICANT CHANGE UP
CULTURE RESULTS: SIGNIFICANT CHANGE UP
SPECIMEN SOURCE: SIGNIFICANT CHANGE UP
SPECIMEN SOURCE: SIGNIFICANT CHANGE UP

## 2017-07-05 DIAGNOSIS — I10 ESSENTIAL (PRIMARY) HYPERTENSION: ICD-10-CM

## 2017-07-05 DIAGNOSIS — E87.1 HYPO-OSMOLALITY AND HYPONATREMIA: ICD-10-CM

## 2017-07-05 DIAGNOSIS — E78.5 HYPERLIPIDEMIA, UNSPECIFIED: ICD-10-CM

## 2017-07-05 DIAGNOSIS — Z66 DO NOT RESUSCITATE: ICD-10-CM

## 2017-07-05 DIAGNOSIS — Z88.8 ALLERGY STATUS TO OTHER DRUGS, MEDICAMENTS AND BIOLOGICAL SUBSTANCES STATUS: ICD-10-CM

## 2017-07-05 DIAGNOSIS — J45.909 UNSPECIFIED ASTHMA, UNCOMPLICATED: ICD-10-CM

## 2017-07-05 DIAGNOSIS — F41.9 ANXIETY DISORDER, UNSPECIFIED: ICD-10-CM

## 2017-07-05 DIAGNOSIS — K59.00 CONSTIPATION, UNSPECIFIED: ICD-10-CM

## 2017-07-05 DIAGNOSIS — F03.90 UNSPECIFIED DEMENTIA, UNSPECIFIED SEVERITY, WITHOUT BEHAVIORAL DISTURBANCE, PSYCHOTIC DISTURBANCE, MOOD DISTURBANCE, AND ANXIETY: ICD-10-CM
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
## 2017-07-05 DIAGNOSIS — I95.9 HYPOTENSION, UNSPECIFIED: ICD-10-CM

## 2017-07-05 DIAGNOSIS — R55 SYNCOPE AND COLLAPSE: ICD-10-CM

## 2017-07-05 DIAGNOSIS — F89 UNSPECIFIED DISORDER OF PSYCHOLOGICAL DEVELOPMENT: ICD-10-CM

## 2017-07-05 LAB
CULTURE RESULTS: SIGNIFICANT CHANGE UP
SPECIMEN SOURCE: SIGNIFICANT CHANGE UP

## 2017-07-10 DIAGNOSIS — E22.2 SYNDROME OF INAPPROPRIATE SECRETION OF ANTIDIURETIC HORMONE: ICD-10-CM

## 2017-07-10 DIAGNOSIS — F22 DELUSIONAL DISORDERS: ICD-10-CM

## 2017-07-10 DIAGNOSIS — E03.9 HYPOTHYROIDISM, UNSPECIFIED: ICD-10-CM

## 2017-07-10 DIAGNOSIS — R33.9 RETENTION OF URINE, UNSPECIFIED: ICD-10-CM

## 2017-07-10 DIAGNOSIS — T50.2X5A ADVERSE EFFECT OF CARBONIC-ANHYDRASE INHIBITORS, BENZOTHIADIAZIDES AND OTHER DIURETICS, INITIAL ENCOUNTER: ICD-10-CM

## 2017-07-12 DIAGNOSIS — G45.9 TRANSIENT CEREBRAL ISCHEMIC ATTACK, UNSPECIFIED: ICD-10-CM

## 2017-07-25 LAB — CULTURE RESULTS: SIGNIFICANT CHANGE UP

## 2017-07-28 RX ORDER — MIRTAZAPINE 45 MG/1
1 TABLET, ORALLY DISINTEGRATING ORAL
Qty: 0 | Refills: 0 | COMMUNITY

## 2017-07-28 RX ORDER — LOSARTAN POTASSIUM 100 MG/1
1 TABLET, FILM COATED ORAL
Qty: 0 | Refills: 0 | COMMUNITY

## 2017-07-28 RX ORDER — LEVOTHYROXINE SODIUM 125 MCG
1 TABLET ORAL
Qty: 0 | Refills: 0 | COMMUNITY

## 2017-07-28 RX ORDER — SENNA PLUS 8.6 MG/1
2 TABLET ORAL
Qty: 0 | Refills: 0 | COMMUNITY

## 2017-07-28 RX ORDER — METOPROLOL TARTRATE 50 MG
0 TABLET ORAL
Qty: 0 | Refills: 0 | COMMUNITY

## 2017-07-28 RX ORDER — FOLIC ACID 0.8 MG
1 TABLET ORAL
Qty: 0 | Refills: 0 | COMMUNITY

## 2017-07-28 RX ORDER — DIVALPROEX SODIUM 500 MG/1
0 TABLET, DELAYED RELEASE ORAL
Qty: 0 | Refills: 0 | COMMUNITY

## 2017-07-28 RX ORDER — AMLODIPINE BESYLATE 2.5 MG/1
1 TABLET ORAL
Qty: 0 | Refills: 0 | COMMUNITY

## 2017-08-10 ENCOUNTER — EMERGENCY (EMERGENCY)
Facility: HOSPITAL | Age: 82
LOS: 1 days | Discharge: HOME | End: 2017-08-10
Attending: EMERGENCY MEDICINE | Admitting: EMERGENCY MEDICINE
Payer: MEDICARE

## 2017-08-10 VITALS
HEART RATE: 71 BPM | WEIGHT: 128.97 LBS | SYSTOLIC BLOOD PRESSURE: 149 MMHG | OXYGEN SATURATION: 94 % | DIASTOLIC BLOOD PRESSURE: 79 MMHG | TEMPERATURE: 98 F | HEIGHT: 64 IN | RESPIRATION RATE: 16 BRPM

## 2017-08-10 VITALS
OXYGEN SATURATION: 98 % | HEART RATE: 76 BPM | SYSTOLIC BLOOD PRESSURE: 138 MMHG | TEMPERATURE: 98 F | RESPIRATION RATE: 16 BRPM | DIASTOLIC BLOOD PRESSURE: 78 MMHG

## 2017-08-10 DIAGNOSIS — I10 ESSENTIAL (PRIMARY) HYPERTENSION: ICD-10-CM

## 2017-08-10 DIAGNOSIS — J45.909 UNSPECIFIED ASTHMA, UNCOMPLICATED: ICD-10-CM

## 2017-08-10 DIAGNOSIS — F41.9 ANXIETY DISORDER, UNSPECIFIED: ICD-10-CM

## 2017-08-10 DIAGNOSIS — F03.90 UNSPECIFIED DEMENTIA, UNSPECIFIED SEVERITY, WITHOUT BEHAVIORAL DISTURBANCE, PSYCHOTIC DISTURBANCE, MOOD DISTURBANCE, AND ANXIETY: ICD-10-CM

## 2017-08-10 DIAGNOSIS — E78.5 HYPERLIPIDEMIA, UNSPECIFIED: ICD-10-CM

## 2017-08-10 DIAGNOSIS — E87.1 HYPO-OSMOLALITY AND HYPONATREMIA: ICD-10-CM

## 2017-08-10 PROBLEM — K59.00 CONSTIPATION, UNSPECIFIED: Chronic | Status: ACTIVE | Noted: 2017-06-25

## 2017-08-10 PROBLEM — F22 DELUSIONAL DISORDERS: Chronic | Status: ACTIVE | Noted: 2017-06-25

## 2017-08-10 LAB
ALBUMIN SERPL ELPH-MCNC: 3.2 G/DL — LOW (ref 3.3–5)
ALP SERPL-CCNC: 87 U/L — SIGNIFICANT CHANGE UP (ref 40–120)
ALT FLD-CCNC: 19 U/L — SIGNIFICANT CHANGE UP (ref 12–78)
ANION GAP SERPL CALC-SCNC: 6 MMOL/L — SIGNIFICANT CHANGE UP (ref 5–17)
AST SERPL-CCNC: 17 U/L — SIGNIFICANT CHANGE UP (ref 15–37)
BASOPHILS # BLD AUTO: 0.1 K/UL — SIGNIFICANT CHANGE UP (ref 0–0.2)
BASOPHILS NFR BLD AUTO: 1 % — SIGNIFICANT CHANGE UP (ref 0–2)
BILIRUB SERPL-MCNC: 0.3 MG/DL — SIGNIFICANT CHANGE UP (ref 0.2–1.2)
BUN SERPL-MCNC: 14 MG/DL — SIGNIFICANT CHANGE UP (ref 7–23)
CALCIUM SERPL-MCNC: 8.6 MG/DL — SIGNIFICANT CHANGE UP (ref 8.5–10.1)
CHLORIDE SERPL-SCNC: 96 MMOL/L — SIGNIFICANT CHANGE UP (ref 96–108)
CO2 SERPL-SCNC: 31 MMOL/L — SIGNIFICANT CHANGE UP (ref 22–31)
CREAT SERPL-MCNC: 0.65 MG/DL — SIGNIFICANT CHANGE UP (ref 0.5–1.3)
EOSINOPHIL # BLD AUTO: 0.1 K/UL — SIGNIFICANT CHANGE UP (ref 0–0.5)
EOSINOPHIL NFR BLD AUTO: 1.4 % — SIGNIFICANT CHANGE UP (ref 0–6)
GLUCOSE SERPL-MCNC: 102 MG/DL — HIGH (ref 70–99)
HCT VFR BLD CALC: 37 % — SIGNIFICANT CHANGE UP (ref 34.5–45)
HGB BLD-MCNC: 12.6 G/DL — SIGNIFICANT CHANGE UP (ref 11.5–15.5)
LYMPHOCYTES # BLD AUTO: 2 K/UL — SIGNIFICANT CHANGE UP (ref 1–3.3)
LYMPHOCYTES # BLD AUTO: 23.8 % — SIGNIFICANT CHANGE UP (ref 13–44)
MCHC RBC-ENTMCNC: 33.9 PG — SIGNIFICANT CHANGE UP (ref 27–34)
MCHC RBC-ENTMCNC: 34.2 GM/DL — SIGNIFICANT CHANGE UP (ref 32–36)
MCV RBC AUTO: 99.2 FL — SIGNIFICANT CHANGE UP (ref 80–100)
MONOCYTES # BLD AUTO: 1 K/UL — HIGH (ref 0–0.9)
MONOCYTES NFR BLD AUTO: 11.9 % — HIGH (ref 1–9)
NEUTROPHILS # BLD AUTO: 5.3 K/UL — SIGNIFICANT CHANGE UP (ref 1.8–7.4)
NEUTROPHILS NFR BLD AUTO: 61.8 % — SIGNIFICANT CHANGE UP (ref 43–77)
PLATELET # BLD AUTO: 202 K/UL — SIGNIFICANT CHANGE UP (ref 150–400)
POTASSIUM SERPL-MCNC: 3.8 MMOL/L — SIGNIFICANT CHANGE UP (ref 3.5–5.3)
POTASSIUM SERPL-SCNC: 3.8 MMOL/L — SIGNIFICANT CHANGE UP (ref 3.5–5.3)
PROT SERPL-MCNC: 6.6 G/DL — SIGNIFICANT CHANGE UP (ref 6–8.3)
RBC # BLD: 3.73 M/UL — LOW (ref 3.8–5.2)
RBC # FLD: 14 % — SIGNIFICANT CHANGE UP (ref 10.3–14.5)
SODIUM SERPL-SCNC: 133 MMOL/L — LOW (ref 135–145)
VALPROATE SERPL-MCNC: 57 UG/ML — SIGNIFICANT CHANGE UP (ref 50–100)
WBC # BLD: 8.5 K/UL — SIGNIFICANT CHANGE UP (ref 3.8–10.5)
WBC # FLD AUTO: 8.5 K/UL — SIGNIFICANT CHANGE UP (ref 3.8–10.5)

## 2017-08-10 PROCEDURE — 80164 ASSAY DIPROPYLACETIC ACD TOT: CPT

## 2017-08-10 PROCEDURE — 96374 THER/PROPH/DIAG INJ IV PUSH: CPT

## 2017-08-10 PROCEDURE — 80053 COMPREHEN METABOLIC PANEL: CPT

## 2017-08-10 PROCEDURE — 99284 EMERGENCY DEPT VISIT MOD MDM: CPT

## 2017-08-10 PROCEDURE — 99284 EMERGENCY DEPT VISIT MOD MDM: CPT | Mod: 25

## 2017-08-10 PROCEDURE — 85027 COMPLETE CBC AUTOMATED: CPT

## 2017-08-10 RX ORDER — SODIUM CHLORIDE 9 MG/ML
1000 INJECTION INTRAMUSCULAR; INTRAVENOUS; SUBCUTANEOUS ONCE
Qty: 0 | Refills: 0 | Status: COMPLETED | OUTPATIENT
Start: 2017-08-10 | End: 2017-08-10

## 2017-08-10 RX ADMIN — SODIUM CHLORIDE 500 MILLILITER(S): 9 INJECTION INTRAMUSCULAR; INTRAVENOUS; SUBCUTANEOUS at 15:00

## 2017-08-10 RX ADMIN — Medication 0.5 MILLIGRAM(S): at 15:13

## 2017-08-10 NOTE — ED PROVIDER NOTE - CONSTITUTIONAL, MLM
normal... Well appearing, well nourished, awake, alert, oriented to person, place, and in no apparent distress.

## 2017-08-10 NOTE — ED PROVIDER NOTE - OBJECTIVE STATEMENT
pt bib ems from snf for hyponatremia with sodium 123 on labs done 2 days ago. pt poor historian, but denies any pain, nausea.  pmd - susan

## 2018-02-28 NOTE — DIETITIAN INITIAL EVALUATION ADULT. - PERTINENT LABORATORY DATA
Mesalamine 2 twice daily;    Reduce the Prednisone to 3 pills crushed daily; for 1 week; then 2 pills/day for 1 wk; then 1 1/2 pills/day for 1 week; the 1 pill/day for 1 week; then 1/2 pill/day for 1 wk then  Off;    OK to receive vaccines (not live);    [id number] [place] [facility];   6/30 Na 133L

## 2018-06-18 ENCOUNTER — TRANSCRIPTION ENCOUNTER (OUTPATIENT)
Age: 83
End: 2018-06-18

## 2018-06-19 ENCOUNTER — EMERGENCY (EMERGENCY)
Facility: HOSPITAL | Age: 83
LOS: 1 days | Discharge: HOME | End: 2018-06-19
Attending: EMERGENCY MEDICINE
Payer: MEDICARE

## 2018-06-19 VITALS
SYSTOLIC BLOOD PRESSURE: 187 MMHG | OXYGEN SATURATION: 93 % | TEMPERATURE: 97 F | DIASTOLIC BLOOD PRESSURE: 81 MMHG | HEART RATE: 72 BPM | HEIGHT: 65 IN | WEIGHT: 125 LBS | RESPIRATION RATE: 16 BRPM

## 2018-06-19 VITALS
TEMPERATURE: 98 F | SYSTOLIC BLOOD PRESSURE: 179 MMHG | DIASTOLIC BLOOD PRESSURE: 80 MMHG | OXYGEN SATURATION: 95 % | HEART RATE: 59 BPM | RESPIRATION RATE: 16 BRPM

## 2018-06-19 LAB
ALBUMIN SERPL ELPH-MCNC: 3.1 G/DL — LOW (ref 3.3–5)
ALP SERPL-CCNC: 76 U/L — SIGNIFICANT CHANGE UP (ref 40–120)
ALT FLD-CCNC: 12 U/L — SIGNIFICANT CHANGE UP (ref 12–78)
ANION GAP SERPL CALC-SCNC: 8 MMOL/L — SIGNIFICANT CHANGE UP (ref 5–17)
APPEARANCE UR: ABNORMAL
AST SERPL-CCNC: 17 U/L — SIGNIFICANT CHANGE UP (ref 15–37)
BASOPHILS # BLD AUTO: 0.06 K/UL — SIGNIFICANT CHANGE UP (ref 0–0.2)
BASOPHILS NFR BLD AUTO: 0.7 % — SIGNIFICANT CHANGE UP (ref 0–2)
BILIRUB SERPL-MCNC: 0.3 MG/DL — SIGNIFICANT CHANGE UP (ref 0.2–1.2)
BILIRUB UR-MCNC: NEGATIVE — SIGNIFICANT CHANGE UP
BUN SERPL-MCNC: 22 MG/DL — SIGNIFICANT CHANGE UP (ref 7–23)
CALCIUM SERPL-MCNC: 9.1 MG/DL — SIGNIFICANT CHANGE UP (ref 8.5–10.1)
CHLORIDE SERPL-SCNC: 103 MMOL/L — SIGNIFICANT CHANGE UP (ref 96–108)
CO2 SERPL-SCNC: 30 MMOL/L — SIGNIFICANT CHANGE UP (ref 22–31)
COLOR SPEC: ABNORMAL
CREAT SERPL-MCNC: 1.1 MG/DL — SIGNIFICANT CHANGE UP (ref 0.5–1.3)
DIFF PNL FLD: ABNORMAL
EOSINOPHIL # BLD AUTO: 0.21 K/UL — SIGNIFICANT CHANGE UP (ref 0–0.5)
EOSINOPHIL NFR BLD AUTO: 2.4 % — SIGNIFICANT CHANGE UP (ref 0–6)
GLUCOSE SERPL-MCNC: 93 MG/DL — SIGNIFICANT CHANGE UP (ref 70–99)
GLUCOSE UR QL: NEGATIVE — SIGNIFICANT CHANGE UP
HCT VFR BLD CALC: 38.4 % — SIGNIFICANT CHANGE UP (ref 34.5–45)
HGB BLD-MCNC: 13.4 G/DL — SIGNIFICANT CHANGE UP (ref 11.5–15.5)
IMM GRANULOCYTES NFR BLD AUTO: 0.2 % — SIGNIFICANT CHANGE UP (ref 0–1.5)
KETONES UR-MCNC: NEGATIVE — SIGNIFICANT CHANGE UP
LEUKOCYTE ESTERASE UR-ACNC: ABNORMAL
LIDOCAIN IGE QN: 62 U/L — LOW (ref 73–393)
LYMPHOCYTES # BLD AUTO: 2.36 K/UL — SIGNIFICANT CHANGE UP (ref 1–3.3)
LYMPHOCYTES # BLD AUTO: 26.6 % — SIGNIFICANT CHANGE UP (ref 13–44)
MCHC RBC-ENTMCNC: 33.4 PG — SIGNIFICANT CHANGE UP (ref 27–34)
MCHC RBC-ENTMCNC: 34.9 GM/DL — SIGNIFICANT CHANGE UP (ref 32–36)
MCV RBC AUTO: 95.8 FL — SIGNIFICANT CHANGE UP (ref 80–100)
MONOCYTES # BLD AUTO: 1.21 K/UL — HIGH (ref 0–0.9)
MONOCYTES NFR BLD AUTO: 13.6 % — SIGNIFICANT CHANGE UP (ref 2–14)
NEUTROPHILS # BLD AUTO: 5.02 K/UL — SIGNIFICANT CHANGE UP (ref 1.8–7.4)
NEUTROPHILS NFR BLD AUTO: 56.5 % — SIGNIFICANT CHANGE UP (ref 43–77)
NITRITE UR-MCNC: NEGATIVE — SIGNIFICANT CHANGE UP
PH UR: 6 — SIGNIFICANT CHANGE UP (ref 5–8)
PLATELET # BLD AUTO: 228 K/UL — SIGNIFICANT CHANGE UP (ref 150–400)
POTASSIUM SERPL-MCNC: 4 MMOL/L — SIGNIFICANT CHANGE UP (ref 3.5–5.3)
POTASSIUM SERPL-SCNC: 4 MMOL/L — SIGNIFICANT CHANGE UP (ref 3.5–5.3)
PROT SERPL-MCNC: 7.2 G/DL — SIGNIFICANT CHANGE UP (ref 6–8.3)
PROT UR-MCNC: 150 MG/DL
RBC # BLD: 4.01 M/UL — SIGNIFICANT CHANGE UP (ref 3.8–5.2)
RBC # FLD: 13.1 % — SIGNIFICANT CHANGE UP (ref 10.3–14.5)
SODIUM SERPL-SCNC: 141 MMOL/L — SIGNIFICANT CHANGE UP (ref 135–145)
SP GR SPEC: 1.01 — SIGNIFICANT CHANGE UP (ref 1.01–1.02)
UROBILINOGEN FLD QL: NEGATIVE — SIGNIFICANT CHANGE UP
WBC # BLD: 8.88 K/UL — SIGNIFICANT CHANGE UP (ref 3.8–10.5)
WBC # FLD AUTO: 8.88 K/UL — SIGNIFICANT CHANGE UP (ref 3.8–10.5)

## 2018-06-19 PROCEDURE — 87040 BLOOD CULTURE FOR BACTERIA: CPT

## 2018-06-19 PROCEDURE — 85027 COMPLETE CBC AUTOMATED: CPT

## 2018-06-19 PROCEDURE — 99284 EMERGENCY DEPT VISIT MOD MDM: CPT

## 2018-06-19 PROCEDURE — 83690 ASSAY OF LIPASE: CPT

## 2018-06-19 PROCEDURE — 87150 DNA/RNA AMPLIFIED PROBE: CPT

## 2018-06-19 PROCEDURE — 87086 URINE CULTURE/COLONY COUNT: CPT

## 2018-06-19 PROCEDURE — 99284 EMERGENCY DEPT VISIT MOD MDM: CPT | Mod: 25

## 2018-06-19 PROCEDURE — 74176 CT ABD & PELVIS W/O CONTRAST: CPT

## 2018-06-19 PROCEDURE — 74176 CT ABD & PELVIS W/O CONTRAST: CPT | Mod: 26

## 2018-06-19 PROCEDURE — 80053 COMPREHEN METABOLIC PANEL: CPT

## 2018-06-19 PROCEDURE — 81001 URINALYSIS AUTO W/SCOPE: CPT

## 2018-06-19 PROCEDURE — 87186 SC STD MICRODIL/AGAR DIL: CPT

## 2018-06-19 RX ORDER — CEPHALEXIN 500 MG
500 CAPSULE ORAL ONCE
Qty: 0 | Refills: 0 | Status: COMPLETED | OUTPATIENT
Start: 2018-06-19 | End: 2018-06-19

## 2018-06-19 RX ORDER — CEPHALEXIN 500 MG
1 CAPSULE ORAL
Qty: 20 | Refills: 0 | OUTPATIENT
Start: 2018-06-19 | End: 2018-06-28

## 2018-06-19 RX ADMIN — Medication 500 MILLIGRAM(S): at 03:54

## 2018-06-19 NOTE — ED PROVIDER NOTE - OBJECTIVE STATEMENT
89yo F h/o dementia, htn, hyponatremia sent from assisted living for hematuria. pt unable to add to history, denies any complaints.

## 2018-06-19 NOTE — ED ADULT TRIAGE NOTE - CHIEF COMPLAINT QUOTE
as per ems patient had blood in diapers earlier today evening, and then  patient was found to have blood in urine

## 2018-06-19 NOTE — ED PROVIDER NOTE - MEDICAL DECISION MAKING DETAILS
87yo F h/o dementia, htn, hyponatremia sent from assisted living for hematuria. pt unable to add to history, denies any complaints. 87yo F h/o dementia, htn, hyponatremia sent from assisted living for hematuria. pt unable to add to history, denies any complaints.  labs unremarkable, ua pos, ct scan shows no mass or stones, given abx, pt to f/u w/ pcp for further eval and mgmt

## 2018-06-19 NOTE — ED ADULT NURSE NOTE - ED STAT RN HANDOFF DETAILS
Discharge instruction provided to EMS picked up at this time for transport back to [facility].

## 2018-06-19 NOTE — ED ADULT NURSE NOTE - OBJECTIVE STATEMENT
Patient brought in by ambulance from [facility] was reported to have blood in her urine waiting for MD evaluation. Patient doesn't know what she's here for she has history of dementia.

## 2018-06-19 NOTE — ED ADULT NURSE REASSESSMENT NOTE - NS ED NURSE REASSESS COMMENT FT1
Called [facility] and advised Tanisha CURRAN that patient is going back to their facility in 60-90 minutes transport arranged by .

## 2018-06-19 NOTE — ED PROVIDER NOTE - PHYSICAL EXAMINATION
GEN: awake, alert, well appearing, NAD   HENT: atraumatic, normocephalic, DEE DEE, EOMI, no midline instability, oropharynx w/o erythema or exudates, no lymphadenopathy  CV: normal rate and rhythm, S1, S2, no MRG, equal pulses throughout, no JVD  RESP: no distress, no IWOB, no retraction, clear to auscultation bilaterally   ABD: soft, nontender, nondistended, no rebound, no guarding, normoactive bowel sounds, no organomegally  MUSCULOSKELETAL: strenght 5/5 x 4, full range of motion, CMS intact   SKIN: normal color, no turgor, no wounds or rash   NEURO: Awake alert oriented x 1, no facial asymmetry, no slurred speech, no pronator drift, moving all extremities  PSYCH: no suicial ideation, no homicidal ideation, no depression, no anxiety, no hallucination

## 2018-06-20 LAB
CULTURE RESULTS: NO GROWTH — SIGNIFICANT CHANGE UP
SPECIMEN SOURCE: SIGNIFICANT CHANGE UP

## 2018-06-23 LAB
-  CANDIDA ALBICANS: SIGNIFICANT CHANGE UP
-  CANDIDA GLABRATA: SIGNIFICANT CHANGE UP
-  CANDIDA KRUSEI: SIGNIFICANT CHANGE UP
-  CANDIDA PARAPSILOSIS: SIGNIFICANT CHANGE UP
-  CANDIDA TROPICALIS: SIGNIFICANT CHANGE UP
-  COAGULASE NEGATIVE STAPHYLOCOCCUS: SIGNIFICANT CHANGE UP
-  K. PNEUMONIAE GROUP: SIGNIFICANT CHANGE UP
-  KPC RESISTANCE GENE: SIGNIFICANT CHANGE UP
-  STREPTOCOCCUS SP. (NOT GRP A, B OR S PNEUMONIAE): SIGNIFICANT CHANGE UP
A BAUMANNII DNA SPEC QL NAA+PROBE: SIGNIFICANT CHANGE UP
E CLOAC COMP DNA BLD POS QL NAA+PROBE: SIGNIFICANT CHANGE UP
E COLI DNA BLD POS QL NAA+NON-PROBE: SIGNIFICANT CHANGE UP
ENTEROCOC DNA BLD POS QL NAA+NON-PROBE: SIGNIFICANT CHANGE UP
ENTEROCOC DNA BLD POS QL NAA+NON-PROBE: SIGNIFICANT CHANGE UP
GP B STREP DNA BLD POS QL NAA+NON-PROBE: SIGNIFICANT CHANGE UP
GRAM STN FLD: SIGNIFICANT CHANGE UP
HAEM INFLU DNA BLD POS QL NAA+NON-PROBE: SIGNIFICANT CHANGE UP
K OXYTOCA DNA BLD POS QL NAA+NON-PROBE: SIGNIFICANT CHANGE UP
L MONOCYTOG DNA BLD POS QL NAA+NON-PROBE: SIGNIFICANT CHANGE UP
METHOD TYPE: SIGNIFICANT CHANGE UP
MRSA SPEC QL CULT: SIGNIFICANT CHANGE UP
MSSA DNA SPEC QL NAA+PROBE: SIGNIFICANT CHANGE UP
N MEN ISLT CULT: SIGNIFICANT CHANGE UP
P AERUGINOSA DNA BLD POS NAA+NON-PROBE: SIGNIFICANT CHANGE UP
S MARCESCENS DNA BLD POS NAA+NON-PROBE: SIGNIFICANT CHANGE UP
S PNEUM DNA BLD POS QL NAA+NON-PROBE: SIGNIFICANT CHANGE UP
S PYO DNA BLD POS QL NAA+NON-PROBE: SIGNIFICANT CHANGE UP

## 2018-06-23 NOTE — ED POST DISCHARGE NOTE - ADDITIONAL DOCUMENTATION
bcx single bottle growth, gram pos cocci in cluster, likely contamination, sensitive for cephalexin, pt was prescribed keflex for uti.

## 2018-06-24 LAB
CULTURE RESULTS: SIGNIFICANT CHANGE UP
SPECIMEN SOURCE: SIGNIFICANT CHANGE UP

## 2018-06-28 LAB
-  AMPICILLIN/SULBACTAM: SIGNIFICANT CHANGE UP
-  CEFAZOLIN: SIGNIFICANT CHANGE UP
-  CLINDAMYCIN: SIGNIFICANT CHANGE UP
-  ERYTHROMYCIN: SIGNIFICANT CHANGE UP
-  GENTAMICIN: SIGNIFICANT CHANGE UP
-  OXACILLIN: SIGNIFICANT CHANGE UP
-  RIFAMPIN: SIGNIFICANT CHANGE UP
-  TETRACYCLINE: SIGNIFICANT CHANGE UP
-  TRIMETHOPRIM/SULFAMETHOXAZOLE: SIGNIFICANT CHANGE UP
-  VANCOMYCIN: SIGNIFICANT CHANGE UP
CULTURE RESULTS: SIGNIFICANT CHANGE UP
METHOD TYPE: SIGNIFICANT CHANGE UP
ORGANISM # SPEC MICROSCOPIC CNT: SIGNIFICANT CHANGE UP
SPECIMEN SOURCE: SIGNIFICANT CHANGE UP

## 2018-08-09 ENCOUNTER — EMERGENCY (EMERGENCY)
Facility: HOSPITAL | Age: 83
LOS: 1 days | Discharge: HOME | End: 2018-08-09
Attending: EMERGENCY MEDICINE
Payer: MEDICARE

## 2018-08-09 VITALS
OXYGEN SATURATION: 91 % | RESPIRATION RATE: 18 BRPM | DIASTOLIC BLOOD PRESSURE: 99 MMHG | SYSTOLIC BLOOD PRESSURE: 189 MMHG | TEMPERATURE: 98 F | HEART RATE: 71 BPM | WEIGHT: 134.92 LBS

## 2018-08-09 VITALS
TEMPERATURE: 98 F | DIASTOLIC BLOOD PRESSURE: 73 MMHG | OXYGEN SATURATION: 95 % | RESPIRATION RATE: 17 BRPM | HEART RATE: 73 BPM | SYSTOLIC BLOOD PRESSURE: 189 MMHG

## 2018-08-09 LAB
ALBUMIN SERPL ELPH-MCNC: 3.1 G/DL — LOW (ref 3.3–5)
ALP SERPL-CCNC: 70 U/L — SIGNIFICANT CHANGE UP (ref 40–120)
ALT FLD-CCNC: 11 U/L — LOW (ref 12–78)
ANION GAP SERPL CALC-SCNC: 9 MMOL/L — SIGNIFICANT CHANGE UP (ref 5–17)
APTT BLD: 30.4 SEC — SIGNIFICANT CHANGE UP (ref 27.5–37.4)
AST SERPL-CCNC: 15 U/L — SIGNIFICANT CHANGE UP (ref 15–37)
BASOPHILS # BLD AUTO: 0.03 K/UL — SIGNIFICANT CHANGE UP (ref 0–0.2)
BASOPHILS NFR BLD AUTO: 0.4 % — SIGNIFICANT CHANGE UP (ref 0–2)
BILIRUB SERPL-MCNC: 0.4 MG/DL — SIGNIFICANT CHANGE UP (ref 0.2–1.2)
BUN SERPL-MCNC: 24 MG/DL — HIGH (ref 7–23)
CALCIUM SERPL-MCNC: 9 MG/DL — SIGNIFICANT CHANGE UP (ref 8.5–10.1)
CHLORIDE SERPL-SCNC: 105 MMOL/L — SIGNIFICANT CHANGE UP (ref 96–108)
CO2 SERPL-SCNC: 31 MMOL/L — SIGNIFICANT CHANGE UP (ref 22–31)
CREAT SERPL-MCNC: 0.78 MG/DL — SIGNIFICANT CHANGE UP (ref 0.5–1.3)
EOSINOPHIL # BLD AUTO: 0.1 K/UL — SIGNIFICANT CHANGE UP (ref 0–0.5)
EOSINOPHIL NFR BLD AUTO: 1.3 % — SIGNIFICANT CHANGE UP (ref 0–6)
GLUCOSE SERPL-MCNC: 108 MG/DL — HIGH (ref 70–99)
HCT VFR BLD CALC: 38.3 % — SIGNIFICANT CHANGE UP (ref 34.5–45)
HGB BLD-MCNC: 13.2 G/DL — SIGNIFICANT CHANGE UP (ref 11.5–15.5)
IMM GRANULOCYTES NFR BLD AUTO: 0.1 % — SIGNIFICANT CHANGE UP (ref 0–1.5)
INR BLD: 1.08 RATIO — SIGNIFICANT CHANGE UP (ref 0.88–1.16)
LYMPHOCYTES # BLD AUTO: 1.4 K/UL — SIGNIFICANT CHANGE UP (ref 1–3.3)
LYMPHOCYTES # BLD AUTO: 17.7 % — SIGNIFICANT CHANGE UP (ref 13–44)
MCHC RBC-ENTMCNC: 33.1 PG — SIGNIFICANT CHANGE UP (ref 27–34)
MCHC RBC-ENTMCNC: 34.5 GM/DL — SIGNIFICANT CHANGE UP (ref 32–36)
MCV RBC AUTO: 96 FL — SIGNIFICANT CHANGE UP (ref 80–100)
MONOCYTES # BLD AUTO: 0.72 K/UL — SIGNIFICANT CHANGE UP (ref 0–0.9)
MONOCYTES NFR BLD AUTO: 9.1 % — SIGNIFICANT CHANGE UP (ref 2–14)
NEUTROPHILS # BLD AUTO: 5.63 K/UL — SIGNIFICANT CHANGE UP (ref 1.8–7.4)
NEUTROPHILS NFR BLD AUTO: 71.4 % — SIGNIFICANT CHANGE UP (ref 43–77)
NRBC # BLD: 0 /100 WBCS — SIGNIFICANT CHANGE UP (ref 0–0)
PLATELET # BLD AUTO: 219 K/UL — SIGNIFICANT CHANGE UP (ref 150–400)
POTASSIUM SERPL-MCNC: 3.9 MMOL/L — SIGNIFICANT CHANGE UP (ref 3.5–5.3)
POTASSIUM SERPL-SCNC: 3.9 MMOL/L — SIGNIFICANT CHANGE UP (ref 3.5–5.3)
PROT SERPL-MCNC: 7.4 G/DL — SIGNIFICANT CHANGE UP (ref 6–8.3)
PROTHROM AB SERPL-ACNC: 11.8 SEC — SIGNIFICANT CHANGE UP (ref 9.8–12.7)
RBC # BLD: 3.99 M/UL — SIGNIFICANT CHANGE UP (ref 3.8–5.2)
RBC # FLD: 12.6 % — SIGNIFICANT CHANGE UP (ref 10.3–14.5)
SODIUM SERPL-SCNC: 145 MMOL/L — SIGNIFICANT CHANGE UP (ref 135–145)
WBC # BLD: 7.89 K/UL — SIGNIFICANT CHANGE UP (ref 3.8–10.5)
WBC # FLD AUTO: 7.89 K/UL — SIGNIFICANT CHANGE UP (ref 3.8–10.5)

## 2018-08-09 PROCEDURE — 72125 CT NECK SPINE W/O DYE: CPT | Mod: 26

## 2018-08-09 PROCEDURE — 99284 EMERGENCY DEPT VISIT MOD MDM: CPT | Mod: 25

## 2018-08-09 PROCEDURE — 93010 ELECTROCARDIOGRAM REPORT: CPT

## 2018-08-09 PROCEDURE — 72192 CT PELVIS W/O DYE: CPT | Mod: 26

## 2018-08-09 PROCEDURE — 70450 CT HEAD/BRAIN W/O DYE: CPT | Mod: 26

## 2018-08-09 NOTE — ED ADULT NURSE REASSESSMENT NOTE - NS ED NURSE REASSESS COMMENT FT1
Patient's daughter refusing to have mother admitted to rehab. Spoke with [facility]. Patient to return to [facility].

## 2018-08-09 NOTE — ED ADULT NURSE NOTE - NSIMPLEMENTINTERV_GEN_ALL_ED
Implemented All Fall with Harm Risk Interventions:  [place] to call system. Call bell, personal items and telephone within reach. Instruct patient to call for assistance. Room bathroom lighting operational. Non-slip footwear when patient is off stretcher. Physically safe environment: no spills, clutter or unnecessary equipment. Stretcher in lowest position, wheels locked, appropriate side rails in place. Provide visual cue, wrist band, yellow gown, etc. Monitor gait and stability. Monitor for mental status changes and reorient to person, place, and time. Review medications for side effects contributing to fall risk. Reinforce activity limits and safety measures with patient and family. Provide visual clues: red socks.

## 2018-08-09 NOTE — ED PROVIDER NOTE - OBJECTIVE STATEMENT
pt bib ems for witnessed fall at assisted living while walking to breakfast, hitting head. per ems, no loc or vomiting, and patient is at baseline per reports they received from staff at assisted living. pt denies pain. no further hx available.  skylar - susan

## 2018-08-09 NOTE — ED PROVIDER NOTE - PROGRESS NOTE DETAILS
d/w daughter (caroline), she relates doesn't want pt to go to rehab, wants pt to return to assisted living, relates pt has tried physical therapy/rehab in past, but doesn't cooperate. d/w daughter (caroline), she relates doesn't want pt to go to rehab, wants pt to return to assisted living, relates pt has tried physical therapy/rehab in past, but doesn't cooperate. results d/w daughter, opportunity to answer questions given. RN D/W assistd living, they will accept pt back.

## 2018-08-09 NOTE — ED ADULT NURSE NOTE - OBJECTIVE STATEMENT
Patient reports that she fell this morning. Patient reports that she thinks she hit her head. Patient denies pain at this time.

## 2018-08-10 ENCOUNTER — EMERGENCY (EMERGENCY)
Facility: HOSPITAL | Age: 83
LOS: 1 days | Discharge: HOME | End: 2018-08-10
Attending: EMERGENCY MEDICINE
Payer: MEDICARE

## 2018-08-10 ENCOUNTER — INPATIENT (INPATIENT)
Facility: HOSPITAL | Age: 83
LOS: 0 days | Discharge: HOME | DRG: 92 | End: 2018-08-10
Attending: FAMILY MEDICINE | Admitting: FAMILY MEDICINE
Payer: COMMERCIAL

## 2018-08-10 VITALS
SYSTOLIC BLOOD PRESSURE: 191 MMHG | TEMPERATURE: 99 F | OXYGEN SATURATION: 95 % | HEART RATE: 69 BPM | RESPIRATION RATE: 18 BRPM | DIASTOLIC BLOOD PRESSURE: 79 MMHG

## 2018-08-10 VITALS
TEMPERATURE: 98 F | WEIGHT: 149.91 LBS | SYSTOLIC BLOOD PRESSURE: 151 MMHG | OXYGEN SATURATION: 93 % | DIASTOLIC BLOOD PRESSURE: 85 MMHG | RESPIRATION RATE: 16 BRPM | HEART RATE: 72 BPM

## 2018-08-10 VITALS
OXYGEN SATURATION: 95 % | SYSTOLIC BLOOD PRESSURE: 174 MMHG | RESPIRATION RATE: 16 BRPM | TEMPERATURE: 98 F | DIASTOLIC BLOOD PRESSURE: 86 MMHG

## 2018-08-10 DIAGNOSIS — I10 ESSENTIAL (PRIMARY) HYPERTENSION: ICD-10-CM

## 2018-08-10 DIAGNOSIS — F03.90 UNSPECIFIED DEMENTIA WITHOUT BEHAVIORAL DISTURBANCE: ICD-10-CM

## 2018-08-10 DIAGNOSIS — E78.5 HYPERLIPIDEMIA, UNSPECIFIED: ICD-10-CM
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
## 2018-08-10 DIAGNOSIS — F41.9 ANXIETY DISORDER, UNSPECIFIED: ICD-10-CM

## 2018-08-10 DIAGNOSIS — N39.0 URINARY TRACT INFECTION, SITE NOT SPECIFIED: ICD-10-CM

## 2018-08-10 LAB
ALBUMIN SERPL ELPH-MCNC: 3.2 G/DL — LOW (ref 3.3–5)
ALBUMIN SERPL ELPH-MCNC: 3.3 G/DL — SIGNIFICANT CHANGE UP (ref 3.3–5)
ALP SERPL-CCNC: 79 U/L — SIGNIFICANT CHANGE UP (ref 40–120)
ALP SERPL-CCNC: 84 U/L — SIGNIFICANT CHANGE UP (ref 40–120)
ALT FLD-CCNC: 15 U/L — SIGNIFICANT CHANGE UP (ref 12–78)
ALT FLD-CCNC: 15 U/L — SIGNIFICANT CHANGE UP (ref 12–78)
ANION GAP SERPL CALC-SCNC: 6 MMOL/L — SIGNIFICANT CHANGE UP (ref 5–17)
ANION GAP SERPL CALC-SCNC: 7 MMOL/L — SIGNIFICANT CHANGE UP (ref 5–17)
APPEARANCE UR: CLEAR — SIGNIFICANT CHANGE UP
AST SERPL-CCNC: 19 U/L — SIGNIFICANT CHANGE UP (ref 15–37)
AST SERPL-CCNC: 25 U/L — SIGNIFICANT CHANGE UP (ref 15–37)
BACTERIA # UR AUTO: NEGATIVE — SIGNIFICANT CHANGE UP
BASOPHILS # BLD AUTO: 0.03 K/UL — SIGNIFICANT CHANGE UP (ref 0–0.2)
BASOPHILS NFR BLD AUTO: 0.4 % — SIGNIFICANT CHANGE UP (ref 0–2)
BILIRUB SERPL-MCNC: 0.5 MG/DL — SIGNIFICANT CHANGE UP (ref 0.2–1.2)
BILIRUB SERPL-MCNC: 0.5 MG/DL — SIGNIFICANT CHANGE UP (ref 0.2–1.2)
BILIRUB UR-MCNC: NEGATIVE — SIGNIFICANT CHANGE UP
BUN SERPL-MCNC: 18 MG/DL — SIGNIFICANT CHANGE UP (ref 7–23)
BUN SERPL-MCNC: 20 MG/DL — SIGNIFICANT CHANGE UP (ref 7–23)
CALCIUM SERPL-MCNC: 9.1 MG/DL — SIGNIFICANT CHANGE UP (ref 8.5–10.1)
CALCIUM SERPL-MCNC: 9.2 MG/DL — SIGNIFICANT CHANGE UP (ref 8.5–10.1)
CHLORIDE SERPL-SCNC: 104 MMOL/L — SIGNIFICANT CHANGE UP (ref 96–108)
CHLORIDE SERPL-SCNC: 105 MMOL/L — SIGNIFICANT CHANGE UP (ref 96–108)
CK SERPL-CCNC: 112 U/L — SIGNIFICANT CHANGE UP (ref 26–192)
CO2 SERPL-SCNC: 31 MMOL/L — SIGNIFICANT CHANGE UP (ref 22–31)
CO2 SERPL-SCNC: 33 MMOL/L — HIGH (ref 22–31)
COLOR SPEC: YELLOW — SIGNIFICANT CHANGE UP
CREAT SERPL-MCNC: 0.82 MG/DL — SIGNIFICANT CHANGE UP (ref 0.5–1.3)
CREAT SERPL-MCNC: 0.85 MG/DL — SIGNIFICANT CHANGE UP (ref 0.5–1.3)
DIFF PNL FLD: ABNORMAL
EOSINOPHIL # BLD AUTO: 0.11 K/UL — SIGNIFICANT CHANGE UP (ref 0–0.5)
EOSINOPHIL NFR BLD AUTO: 1.6 % — SIGNIFICANT CHANGE UP (ref 0–6)
EPI CELLS # UR: SIGNIFICANT CHANGE UP
GLUCOSE SERPL-MCNC: 101 MG/DL — HIGH (ref 70–99)
GLUCOSE SERPL-MCNC: 106 MG/DL — HIGH (ref 70–99)
GLUCOSE UR QL: NEGATIVE — SIGNIFICANT CHANGE UP
HBA1C BLD-MCNC: 5.4 % — SIGNIFICANT CHANGE UP (ref 4–5.6)
HCT VFR BLD CALC: 39.9 % — SIGNIFICANT CHANGE UP (ref 34.5–45)
HCT VFR BLD CALC: 40.8 % — SIGNIFICANT CHANGE UP (ref 34.5–45)
HGB BLD-MCNC: 13.5 G/DL — SIGNIFICANT CHANGE UP (ref 11.5–15.5)
HGB BLD-MCNC: 13.9 G/DL — SIGNIFICANT CHANGE UP (ref 11.5–15.5)
IMM GRANULOCYTES NFR BLD AUTO: 0.3 % — SIGNIFICANT CHANGE UP (ref 0–1.5)
KETONES UR-MCNC: ABNORMAL
LEUKOCYTE ESTERASE UR-ACNC: ABNORMAL
LYMPHOCYTES # BLD AUTO: 1.31 K/UL — SIGNIFICANT CHANGE UP (ref 1–3.3)
LYMPHOCYTES # BLD AUTO: 19.3 % — SIGNIFICANT CHANGE UP (ref 13–44)
MCHC RBC-ENTMCNC: 32.8 PG — SIGNIFICANT CHANGE UP (ref 27–34)
MCHC RBC-ENTMCNC: 33.1 PG — SIGNIFICANT CHANGE UP (ref 27–34)
MCHC RBC-ENTMCNC: 33.8 GM/DL — SIGNIFICANT CHANGE UP (ref 32–36)
MCHC RBC-ENTMCNC: 34.1 GM/DL — SIGNIFICANT CHANGE UP (ref 32–36)
MCV RBC AUTO: 96.8 FL — SIGNIFICANT CHANGE UP (ref 80–100)
MCV RBC AUTO: 97.1 FL — SIGNIFICANT CHANGE UP (ref 80–100)
MONOCYTES # BLD AUTO: 0.8 K/UL — SIGNIFICANT CHANGE UP (ref 0–0.9)
MONOCYTES NFR BLD AUTO: 11.8 % — SIGNIFICANT CHANGE UP (ref 2–14)
NEUTROPHILS # BLD AUTO: 4.52 K/UL — SIGNIFICANT CHANGE UP (ref 1.8–7.4)
NEUTROPHILS NFR BLD AUTO: 66.6 % — SIGNIFICANT CHANGE UP (ref 43–77)
NITRITE UR-MCNC: NEGATIVE — SIGNIFICANT CHANGE UP
NRBC # BLD: 0 /100 WBCS — SIGNIFICANT CHANGE UP (ref 0–0)
NRBC # BLD: 0 /100 WBCS — SIGNIFICANT CHANGE UP (ref 0–0)
PH UR: 7 — SIGNIFICANT CHANGE UP (ref 5–8)
PLATELET # BLD AUTO: 234 K/UL — SIGNIFICANT CHANGE UP (ref 150–400)
PLATELET # BLD AUTO: 240 K/UL — SIGNIFICANT CHANGE UP (ref 150–400)
POTASSIUM SERPL-MCNC: 3.8 MMOL/L — SIGNIFICANT CHANGE UP (ref 3.5–5.3)
POTASSIUM SERPL-MCNC: 4.2 MMOL/L — SIGNIFICANT CHANGE UP (ref 3.5–5.3)
POTASSIUM SERPL-SCNC: 3.8 MMOL/L — SIGNIFICANT CHANGE UP (ref 3.5–5.3)
POTASSIUM SERPL-SCNC: 4.2 MMOL/L — SIGNIFICANT CHANGE UP (ref 3.5–5.3)
PROT SERPL-MCNC: 7.6 G/DL — SIGNIFICANT CHANGE UP (ref 6–8.3)
PROT SERPL-MCNC: 7.8 G/DL — SIGNIFICANT CHANGE UP (ref 6–8.3)
PROT UR-MCNC: 25 MG/DL
RBC # BLD: 4.12 M/UL — SIGNIFICANT CHANGE UP (ref 3.8–5.2)
RBC # BLD: 4.2 M/UL — SIGNIFICANT CHANGE UP (ref 3.8–5.2)
RBC # FLD: 12.6 % — SIGNIFICANT CHANGE UP (ref 10.3–14.5)
RBC # FLD: 12.7 % — SIGNIFICANT CHANGE UP (ref 10.3–14.5)
RBC CASTS # UR COMP ASSIST: SIGNIFICANT CHANGE UP /HPF (ref 0–4)
SODIUM SERPL-SCNC: 142 MMOL/L — SIGNIFICANT CHANGE UP (ref 135–145)
SODIUM SERPL-SCNC: 144 MMOL/L — SIGNIFICANT CHANGE UP (ref 135–145)
SP GR SPEC: 1 — LOW (ref 1.01–1.02)
TROPONIN I SERPL-MCNC: 0.02 NG/ML — SIGNIFICANT CHANGE UP (ref 0.01–0.04)
TSH SERPL-MCNC: 5.13 UIU/ML — HIGH (ref 0.36–3.74)
UROBILINOGEN FLD QL: NEGATIVE — SIGNIFICANT CHANGE UP
WBC # BLD: 6.79 K/UL — SIGNIFICANT CHANGE UP (ref 3.8–10.5)
WBC # BLD: 9.88 K/UL — SIGNIFICANT CHANGE UP (ref 3.8–10.5)
WBC # FLD AUTO: 6.79 K/UL — SIGNIFICANT CHANGE UP (ref 3.8–10.5)
WBC # FLD AUTO: 9.88 K/UL — SIGNIFICANT CHANGE UP (ref 3.8–10.5)
WBC UR QL: ABNORMAL

## 2018-08-10 PROCEDURE — 70450 CT HEAD/BRAIN W/O DYE: CPT | Mod: 26

## 2018-08-10 PROCEDURE — 99284 EMERGENCY DEPT VISIT MOD MDM: CPT

## 2018-08-10 PROCEDURE — 73552 X-RAY EXAM OF FEMUR 2/>: CPT | Mod: 26,LT

## 2018-08-10 PROCEDURE — 99285 EMERGENCY DEPT VISIT HI MDM: CPT

## 2018-08-10 PROCEDURE — 71045 X-RAY EXAM CHEST 1 VIEW: CPT | Mod: 26

## 2018-08-10 PROCEDURE — 70450 CT HEAD/BRAIN W/O DYE: CPT | Mod: 26,77

## 2018-08-10 PROCEDURE — 73502 X-RAY EXAM HIP UNI 2-3 VIEWS: CPT | Mod: 26,LT

## 2018-08-10 PROCEDURE — 73562 X-RAY EXAM OF KNEE 3: CPT | Mod: 26,LT

## 2018-08-10 PROCEDURE — 70486 CT MAXILLOFACIAL W/O DYE: CPT | Mod: 26

## 2018-08-10 PROCEDURE — 93010 ELECTROCARDIOGRAM REPORT: CPT

## 2018-08-10 PROCEDURE — 72125 CT NECK SPINE W/O DYE: CPT | Mod: 26

## 2018-08-10 RX ORDER — AMLODIPINE BESYLATE 2.5 MG/1
2.5 TABLET ORAL ONCE
Qty: 0 | Refills: 0 | Status: COMPLETED | OUTPATIENT
Start: 2018-08-10 | End: 2018-08-10

## 2018-08-10 RX ORDER — HEPARIN SODIUM 5000 [USP'U]/ML
5000 INJECTION INTRAVENOUS; SUBCUTANEOUS EVERY 12 HOURS
Qty: 0 | Refills: 0 | Status: DISCONTINUED | OUTPATIENT
Start: 2018-08-10 | End: 2018-08-10

## 2018-08-10 RX ORDER — VALPROIC ACID (AS SODIUM SALT) 250 MG/5ML
250 SOLUTION, ORAL ORAL ONCE
Qty: 0 | Refills: 0 | Status: COMPLETED | OUTPATIENT
Start: 2018-08-10 | End: 2018-08-10

## 2018-08-10 RX ORDER — CEFUROXIME AXETIL 250 MG
500 TABLET ORAL ONCE
Qty: 0 | Refills: 0 | Status: COMPLETED | OUTPATIENT
Start: 2018-08-10 | End: 2018-08-10

## 2018-08-10 RX ORDER — MIRTAZAPINE 45 MG/1
15 TABLET, ORALLY DISINTEGRATING ORAL AT BEDTIME
Qty: 0 | Refills: 0 | Status: DISCONTINUED | OUTPATIENT
Start: 2018-08-10 | End: 2018-08-10

## 2018-08-10 RX ORDER — AMLODIPINE BESYLATE 2.5 MG/1
2.5 TABLET ORAL DAILY
Qty: 0 | Refills: 0 | Status: DISCONTINUED | OUTPATIENT
Start: 2018-08-10 | End: 2018-08-10

## 2018-08-10 RX ORDER — MAGNESIUM HYDROXIDE 400 MG/1
30 TABLET, CHEWABLE ORAL DAILY
Qty: 0 | Refills: 0 | Status: DISCONTINUED | OUTPATIENT
Start: 2018-08-10 | End: 2018-08-10

## 2018-08-10 RX ORDER — LEVOTHYROXINE SODIUM 125 MCG
100 TABLET ORAL DAILY
Qty: 0 | Refills: 0 | Status: DISCONTINUED | OUTPATIENT
Start: 2018-08-10 | End: 2018-08-10

## 2018-08-10 RX ORDER — ATORVASTATIN CALCIUM 80 MG/1
10 TABLET, FILM COATED ORAL AT BEDTIME
Qty: 0 | Refills: 0 | Status: DISCONTINUED | OUTPATIENT
Start: 2018-08-10 | End: 2018-08-10

## 2018-08-10 RX ORDER — SODIUM CHLORIDE 9 MG/ML
1000 INJECTION INTRAMUSCULAR; INTRAVENOUS; SUBCUTANEOUS ONCE
Qty: 0 | Refills: 0 | Status: COMPLETED | OUTPATIENT
Start: 2018-08-10 | End: 2018-08-10

## 2018-08-10 RX ORDER — ALBUTEROL 90 UG/1
2 AEROSOL, METERED ORAL EVERY 6 HOURS
Qty: 0 | Refills: 0 | Status: DISCONTINUED | OUTPATIENT
Start: 2018-08-10 | End: 2018-08-10

## 2018-08-10 RX ORDER — DIVALPROEX SODIUM 500 MG/1
250 TABLET, DELAYED RELEASE ORAL
Qty: 0 | Refills: 0 | Status: DISCONTINUED | OUTPATIENT
Start: 2018-08-10 | End: 2018-08-10

## 2018-08-10 RX ORDER — LOSARTAN POTASSIUM 100 MG/1
100 TABLET, FILM COATED ORAL ONCE
Qty: 0 | Refills: 0 | Status: COMPLETED | OUTPATIENT
Start: 2018-08-10 | End: 2018-08-10

## 2018-08-10 RX ORDER — FOLIC ACID 0.8 MG
1 TABLET ORAL DAILY
Qty: 0 | Refills: 0 | Status: DISCONTINUED | OUTPATIENT
Start: 2018-08-10 | End: 2018-08-10

## 2018-08-10 RX ORDER — SENNA PLUS 8.6 MG/1
2 TABLET ORAL AT BEDTIME
Qty: 0 | Refills: 0 | Status: DISCONTINUED | OUTPATIENT
Start: 2018-08-10 | End: 2018-08-10

## 2018-08-10 RX ORDER — SOD,AMMONIUM,POTASSIUM LACTATE
1 CREAM (GRAM) TOPICAL
Qty: 0 | Refills: 0 | Status: DISCONTINUED | OUTPATIENT
Start: 2018-08-10 | End: 2018-08-10

## 2018-08-10 RX ORDER — SODIUM CHLORIDE 9 MG/ML
3 INJECTION INTRAMUSCULAR; INTRAVENOUS; SUBCUTANEOUS ONCE
Qty: 0 | Refills: 0 | Status: COMPLETED | OUTPATIENT
Start: 2018-08-10 | End: 2018-08-10

## 2018-08-10 RX ORDER — LOSARTAN POTASSIUM 100 MG/1
100 TABLET, FILM COATED ORAL DAILY
Qty: 0 | Refills: 0 | Status: DISCONTINUED | OUTPATIENT
Start: 2018-08-10 | End: 2018-08-10

## 2018-08-10 RX ORDER — METOPROLOL TARTRATE 50 MG
25 TABLET ORAL ONCE
Qty: 0 | Refills: 0 | Status: COMPLETED | OUTPATIENT
Start: 2018-08-10 | End: 2018-08-10

## 2018-08-10 RX ORDER — CEFUROXIME AXETIL 250 MG
1 TABLET ORAL
Qty: 20 | Refills: 0 | OUTPATIENT
Start: 2018-08-10 | End: 2018-08-19

## 2018-08-10 RX ORDER — METOPROLOL TARTRATE 50 MG
25 TABLET ORAL DAILY
Qty: 0 | Refills: 0 | Status: DISCONTINUED | OUTPATIENT
Start: 2018-08-10 | End: 2018-08-10

## 2018-08-10 RX ORDER — DOCUSATE SODIUM 100 MG
100 CAPSULE ORAL DAILY
Qty: 0 | Refills: 0 | Status: DISCONTINUED | OUTPATIENT
Start: 2018-08-10 | End: 2018-08-10

## 2018-08-10 RX ADMIN — SODIUM CHLORIDE 3 MILLILITER(S): 9 INJECTION INTRAMUSCULAR; INTRAVENOUS; SUBCUTANEOUS at 16:44

## 2018-08-10 RX ADMIN — AMLODIPINE BESYLATE 2.5 MILLIGRAM(S): 2.5 TABLET ORAL at 21:08

## 2018-08-10 RX ADMIN — LOSARTAN POTASSIUM 100 MILLIGRAM(S): 100 TABLET, FILM COATED ORAL at 16:43

## 2018-08-10 RX ADMIN — Medication 0.2 MILLIGRAM(S): at 06:43

## 2018-08-10 RX ADMIN — Medication 25 MILLIGRAM(S): at 16:43

## 2018-08-10 RX ADMIN — AMLODIPINE BESYLATE 2.5 MILLIGRAM(S): 2.5 TABLET ORAL at 16:43

## 2018-08-10 RX ADMIN — Medication 500 MILLIGRAM(S): at 06:40

## 2018-08-10 RX ADMIN — Medication 250 MILLIGRAM(S): at 16:44

## 2018-08-10 NOTE — ED ADULT NURSE REASSESSMENT NOTE - NS ED NURSE REASSESS COMMENT FT1
Labs were obtained, but IV insertion was unsuccessful. Patient refused. Explained to the patient the importance of having IV in place, but she still refused. Dr. Gonzalez is aware.

## 2018-08-10 NOTE — ED ADULT TRIAGE NOTE - CHIEF COMPLAINT QUOTE
pt from  [facility] s/p fall and hit back of head  swelling lt knee and lt facial area with bloody nose noted

## 2018-08-10 NOTE — DISCHARGE NOTE ADULT - MEDICATION SUMMARY - MEDICATIONS TO TAKE
I will START or STAY ON the medications listed below when I get home from the hospital:    losartan 100 mg oral tablet  -- 1 tab(s) by mouth once a day hold for SBP<110  -- with potassium   -- Indication: For =    magnesium hydroxide 8% oral suspension  -- 30 milliliter(s) by mouth once a day, As needed, Constipation  -- Indication: For =    Ativan 0.5 mg oral tablet  -- 0.5 tab(s) by mouth once a day (at bedtime) for anxiety MDD:0.25 mg  -- Caution federal law prohibits the transfer of this drug to any person other  than the person for whom it was prescribed.  Do not take this drug if you are pregnant.  May cause drowsiness.  Alcohol may intensify this effect.  Use care when operating dangerous machinery.    -- Indication: For =    LORazepam 0.5 mg oral tablet  -- 1 tab(s) by mouth 3 times a day, As Needed MDD:3tabs  -- as needed for anxiety   -- Indication: For =    Depakote 250 mg oral delayed release tablet  -- orally 2 times a day  -- Indication: For =    mirtazapine 15 mg oral tablet  -- 1 tab(s) by mouth once a day (at bedtime)  -- Indication: For =    atorvastatin 10 mg oral tablet  -- 1 tab(s) by mouth once a day (at bedtime) for HLD  -- Indication: For =    Metoprolol Succinate ER 25 mg oral tablet, extended release  -- 1 tab(s) by mouth 2 times a day hold for SBP<110 HR<60  -- Indication: For =    metoprolol tartrate 25 mg oral tablet  -- 1 tab(s) by mouth every 12 hours for HTN  -- Indication: For =    ProAir HFA 90 mcg/inh inhalation aerosol  -- 2 puff(s) inhaled every 6 hours, As Needed  -- Indication: For =    amLODIPine 2.5 mg oral tablet  -- 1 tab(s) by mouth once a day  -- Indication: For =    hydrocortisone 0.5% topical ointment  -- 1 application on skin once a day - rash  -- Indication: For =    ammonium lactate 12% topical lotion  -- 1 application on skin 2 times a day -for dry skin  -- Indication: For =    docusate sodium 100 mg oral capsule  -- 1 cap(s) by mouth once a day  -- Indication: For =    senna 8.6 mg oral tablet  -- 2 tab(s) by mouth once a day (at bedtime)  -- Indication: For =    levothyroxine 100 mcg (0.1 mg) oral tablet  -- 1 tab(s) by mouth once a day  -- Indication: For =    folic acid 1 mg oral tablet  -- 1 tab(s) by mouth once a day - vitamin suppliment  -- Indication: For =

## 2018-08-10 NOTE — ED ADULT NURSE NOTE - OBJECTIVE STATEMENT
pt to er by ambulance sent from facility for evaluation of high blood pressure pt is awake and alert denies pain resp even unlabored resting comfortably on stretcher at present

## 2018-08-10 NOTE — PHYSICAL THERAPY INITIAL EVALUATION ADULT - PERTINENT HX OF CURRENT PROBLEM, REHAB EVAL
87 y/o WF h/o Anxiety disorder  Asthma  Dementia  Hyperlipidemia  Hypertension C/O the patient has had multiple recent falls, the patients daughter describes advancing Parkinson like symptoms, unsteady, shuffling and increasing wheel chair bound. She fell and sustained head trauma, knee contusion, no HA, no neck pain, no LOC no CP, no SOB no acute stroke symptoms.

## 2018-08-10 NOTE — ED PROVIDER NOTE - PROGRESS NOTE DETAILS
Pt with some persistent HTN, otherwise asymptmatic. Will have pt seen by cardio Pt seen by Dr ROLANDO Damian. Will give addl meds now, should monitor pt in ed overnight and dc in am. He will cont to fu with pt at [place].

## 2018-08-10 NOTE — ED PROVIDER NOTE - OBJECTIVE STATEMENT
89 yo F p/w had 2 falls [facility] yesterday - seen in ed and had neg CT head. Pt was told today at [facility] that had elevated BP. EMS called and brought pt to ed with nl BP. No numb/ting/focal weak. Pt reportly 89 yo F p/w had 2 falls since yesterday - seen in ed and had neg CT head. Pt was told today at [facility] that had elevated BP. EMS called and brought pt to ed with nl BP. No numb/ting/focal weak. Pt reportedly found with some HTN today at [facility] - sent foe jojo. Staff reports pt co feeling slight dizzy, denies now. Pt is currently asymptomatic. No numb/ting/focal weak. NO HA/visual changes. no cp/sob/palp. no agg/allev factors. No other inj or co.

## 2018-08-10 NOTE — ED PROVIDER NOTE - PROGRESS NOTE DETAILS
Jose Miguel Gupta. Spoke with the facility. The were informed of UTI Dx  and treatment plan Discussed with the patients daughter Mrs. Syl Cole.  Concerned about multiple  falls and the patients safety. We will call social service at [facility]  in the morning to evaluate situation  . Heaven Gupta at [facility] notified Discussed with Isela HAMEED plan admit 3 day then into Rehab

## 2018-08-10 NOTE — ED ADULT NURSE NOTE - OBJECTIVE STATEMENT
Pt presents S/P fall at the assisted living, spoke with nurse Tanisha who states pt was sent to the ER yesterday morning for a previous fall that day, since then pt is unable to bear weight on her left leg with left knee swelling, and fell  again this morning. Fall was unwitnessed.

## 2018-08-10 NOTE — ED PROVIDER NOTE - MEDICAL DECISION MAKING DETAILS
IVF labs cxr ekg enzyme u/a ceftin ct head, max facial IVF labs cxr ekg enzyme u/a ceftin ct head, max facial Catapres

## 2018-08-10 NOTE — ED PROVIDER NOTE - CONSTITUTIONAL, MLM
normal... At baseline dementia  awake, alert, oriented to person, place,  and in no apparent distress.

## 2018-08-10 NOTE — ED PROVIDER NOTE - ENMT, MLM
Airway patent, Nasal mucosa clear. Mouth with normal mucosa. Throat has no vesicles, no oropharyngeal exudates and uvula is midline. MM MOist. neck supple. no meningeal signs.

## 2018-08-10 NOTE — H&P ADULT - ATTENDING COMMENTS
pt jojo noted  recommend [facility] vs home with 24 hr aid  daughter reluctant for [facility]

## 2018-08-10 NOTE — PHYSICAL THERAPY INITIAL EVALUATION ADULT - ADDITIONAL COMMENTS
Pt lives at [facility].  Pt has been experiencing frequent falls and has been noted to ambulating less and utilizing wheelchair more for locomotion.  Pt required assistance with all ADLs prior to hospitalization.

## 2018-08-10 NOTE — ED ADULT NURSE NOTE - NSIMPLEMENTINTERV_GEN_ALL_ED
Implemented All Fall with Harm Risk Interventions:  [place] to call system. Call bell, personal items and telephone within reach. Instruct patient to call for assistance. Room bathroom lighting operational. Non-slip footwear when patient is off stretcher. Physically safe environment: no spills, clutter or unnecessary equipment. Stretcher in lowest position, wheels locked, appropriate side rails in place. Provide visual cue, wrist band, yellow gown, etc. Monitor gait and stability. Monitor for mental status changes and reorient to person, place, and time. Review medications for side effects contributing to fall risk. Reinforce activity limits and safety measures with patient and family. Provide visual clues: red socks.

## 2018-08-10 NOTE — ED PROVIDER NOTE - CHPI ED SYMPTOMS NEG
no nausea/no syncope/no vomiting/no shortness of breath/no cough/no diaphoresis/no chills/no back pain/no chest pain/no fever

## 2018-08-10 NOTE — DISCHARGE NOTE ADULT - PATIENT PORTAL LINK FT
You can access the [facility] Patient Portal, offered by [facility], by registering with the following website: [email or web address]

## 2018-08-10 NOTE — H&P ADULT - HISTORY OF PRESENT ILLNESS
87 y/o WF h/o Anxiety disorder  Asthma  Dementia  Hyperlipidemia  Hypertension C/O the patient has had multiple recent falls, the patients daughter describes advancing Parkinson like symptoms, unsteady, shuffling and increasing wheel chair bound. She fell and sustained head trauma, knee contusion, no HA, no neck pain, no LOC no CP, no SOB no acute stroke symptoms. in er evaluated by pt.

## 2018-08-10 NOTE — DISCHARGE NOTE ADULT - MEDICATION SUMMARY - MEDICATIONS TO STOP TAKING
I will STOP taking the medications listed below when I get home from the hospital:    cefuroxime 500 mg oral tablet  -- 1 tab(s) by mouth every 12 hours   -- Finish all this medication unless otherwise directed by prescriber.  Medication should be taken with plenty of water.  Take with food or milk.

## 2018-08-10 NOTE — DISCHARGE NOTE ADULT - CARE PROVIDER_API CALL
randy perea  Phone: (   )    -  Fax: (   )    -    Gary Campbell), Neurology  [address]  [place] [address]  Phone: [phone]    Tomas Damian), Cardiology Medicine  [address] [place] [address] [place] [address]  Phone: [phone]  Fax: [phone]

## 2018-08-10 NOTE — ED PROVIDER NOTE - OBJECTIVE STATEMENT
the patient has had multiple recent falls, the patients daughter describes advancing Parkinson like symptoms, unsteady, shuffling and increasing wheel chair bound. 89 y/o WF the patient has had multiple recent falls, the patients daughter describes advancing Parkinson like symptoms, unsteady, shuffling and increasing wheel chair bound. 88 y/oWF h/o Anxiety disorder  Asthma  Dementia  Hyperlipidemia  Hypertension C/O the patient has had multiple recent falls, the patients daughter describes advancing Parkinson like symptoms, unsteady, shuffling and increasing wheel chair bound. She fell and sustained head trauma, knee contusion, no HA, no neck pain, no LOC no CP, no SOB no acute stroke symptoms. 89 y/o WF h/o Anxiety disorder  Asthma  Dementia  Hyperlipidemia  Hypertension C/O the patient has had multiple recent falls, the patients daughter describes advancing Parkinson like symptoms, unsteady, shuffling and increasing wheel chair bound. She fell and sustained head trauma, knee contusion, no HA, no neck pain, no LOC no CP, no SOB no acute stroke symptoms.

## 2018-08-10 NOTE — DISCHARGE NOTE ADULT - HOSPITAL COURSE
admitted for recurrent falls per ERMD  seen by PT in ER  cleared by PT for return to [facility]  out patient follow up with neuro and cardio  DC to [facility] per case management

## 2018-08-10 NOTE — DISCHARGE NOTE ADULT - PROVIDER TOKENS
FREE:[LAST:[brit],FIRST:[randy],PHONE:[(   )    -],FAX:[(   )    -],ADDRESS:[[place]]],TOKEN:'[id number]',TOKEN:'[id number]'

## 2018-08-10 NOTE — DISCHARGE NOTE ADULT - CARE PLAN
Principal Discharge DX:	Fall  Goal:	free from falling  Assessment and plan of treatment:	follow up with PMD Dr. GARCIA

## 2018-08-10 NOTE — ED PROVIDER NOTE - CARE PLAN
Principal Discharge DX:	Fall  Secondary Diagnosis:	Multiple trauma  Secondary Diagnosis:	UTI (urinary tract infection)

## 2018-08-10 NOTE — ED ADULT NURSE NOTE - CHPI ED NUR SYMPTOMS NEG
no chills/no decreased eating/drinking/no vomiting/no weakness/no dizziness/no fever/no pain/no tingling

## 2018-08-10 NOTE — ED ADULT NURSE NOTE - NSIMPLEMENTINTERV_GEN_ALL_ED
Was due in Nov. For f/u  Sent 1 month with apt. Note.    Implemented All Fall with Harm Risk Interventions:  [place] to call system. Call bell, personal items and telephone within reach. Instruct patient to call for assistance. Room bathroom lighting operational. Non-slip footwear when patient is off stretcher. Physically safe environment: no spills, clutter or unnecessary equipment. Stretcher in lowest position, wheels locked, appropriate side rails in place. Provide visual cue, wrist band, yellow gown, etc. Monitor gait and stability. Monitor for mental status changes and reorient to person, place, and time. Review medications for side effects contributing to fall risk. Reinforce activity limits and safety measures with patient and family. Provide visual clues: red socks.

## 2018-08-11 VITALS
DIASTOLIC BLOOD PRESSURE: 80 MMHG | HEART RATE: 85 BPM | SYSTOLIC BLOOD PRESSURE: 152 MMHG | OXYGEN SATURATION: 99 % | RESPIRATION RATE: 16 BRPM | TEMPERATURE: 98 F

## 2018-08-11 LAB
CULTURE RESULTS: NO GROWTH — SIGNIFICANT CHANGE UP
SPECIMEN SOURCE: SIGNIFICANT CHANGE UP
T3 SERPL-MCNC: 70 NG/DL — LOW (ref 80–200)
T4 AB SER-ACNC: 7.1 UG/DL — SIGNIFICANT CHANGE UP (ref 4.6–12)

## 2018-08-11 PROCEDURE — 85027 COMPLETE CBC AUTOMATED: CPT

## 2018-08-11 PROCEDURE — G8979: CPT | Mod: CL

## 2018-08-11 PROCEDURE — G8978: CPT | Mod: CL

## 2018-08-11 PROCEDURE — 96374 THER/PROPH/DIAG INJ IV PUSH: CPT

## 2018-08-11 PROCEDURE — 72125 CT NECK SPINE W/O DYE: CPT

## 2018-08-11 PROCEDURE — 97161 PT EVAL LOW COMPLEX 20 MIN: CPT

## 2018-08-11 PROCEDURE — 70450 CT HEAD/BRAIN W/O DYE: CPT

## 2018-08-11 PROCEDURE — G8980: CPT | Mod: CL

## 2018-08-11 PROCEDURE — 80053 COMPREHEN METABOLIC PANEL: CPT

## 2018-08-11 PROCEDURE — 99285 EMERGENCY DEPT VISIT HI MDM: CPT | Mod: 25

## 2018-08-11 RX ORDER — LOSARTAN POTASSIUM 100 MG/1
50 TABLET, FILM COATED ORAL DAILY
Qty: 0 | Refills: 0 | Status: DISCONTINUED | OUTPATIENT
Start: 2018-08-11 | End: 2018-08-14

## 2018-08-11 RX ORDER — HYDRALAZINE HCL 50 MG
10 TABLET ORAL ONCE
Qty: 0 | Refills: 0 | Status: COMPLETED | OUTPATIENT
Start: 2018-08-11 | End: 2018-08-11

## 2018-08-11 RX ORDER — HYDROCHLOROTHIAZIDE 25 MG
25 TABLET ORAL ONCE
Qty: 0 | Refills: 0 | Status: COMPLETED | OUTPATIENT
Start: 2018-08-11 | End: 2018-08-11

## 2018-08-11 RX ORDER — AMLODIPINE BESYLATE 2.5 MG/1
5 TABLET ORAL ONCE
Qty: 0 | Refills: 0 | Status: COMPLETED | OUTPATIENT
Start: 2018-08-11 | End: 2018-08-11

## 2018-08-11 RX ADMIN — AMLODIPINE BESYLATE 5 MILLIGRAM(S): 2.5 TABLET ORAL at 00:53

## 2018-08-11 RX ADMIN — Medication 10 MILLIGRAM(S): at 08:08

## 2018-08-11 RX ADMIN — Medication 25 MILLIGRAM(S): at 00:53

## 2018-08-11 RX ADMIN — LOSARTAN POTASSIUM 50 MILLIGRAM(S): 100 TABLET, FILM COATED ORAL at 02:32

## 2018-08-11 NOTE — CONSULT NOTE ADULT - SUBJECTIVE AND OBJECTIVE BOX
[facility]     Patient is a 88y old  Female who presents with a chief complaint of     HPI:      HPI:    88y Female for Cardiology Consult    PAST MEDICAL & SURGICAL HISTORY:  Constipation  Asthma  Hyperlipidemia  Hypertension  Anxiety disorder  Delusional disorder  Dementia  No significant past surgical history      FAMILY HISTORY:  No pertinent family history in first degree relatives      SOCIAL HISTORY:   Alcohol:  Smoking:    Allergies    aspirin (Anaphylaxis)  aspirin (Unknown)  mangoes (Unknown)  mangos (Unknown)  shellfish (Unknown)    Intolerances        MEDICATIONS  (STANDING):    MEDICATIONS  (PRN):      REVIEW OF SYSTEMS:  CONSTITUTIONAL: No fever, weight loss, chills, shakes, or fat  RESPIRATORY: No cough, wheezing, hemoptysis, or shortness of breath  CARDIOVASCULAR: No chest pain, dyspnea, palpitations, dizziness, syncope, paroxysmal nocturnal dyspnea, orthopnea, or arm or leg swelling  GASTROINTESTINAL: No abdominal  or epigastric pain, nausea, vomiting, hematemesis, diarrhea, constipation, melena or bright red bloo  NEUROLOGICAL: No headaches, memory loss, slurred speech, limb weakness, loss of strength, numbness, or tremors  SKIN: No itching, burning, rashes, or lesions  ENDOCRINE: No heat or cold intolerance, or hair loss  MUSCULOSKELETAL: No joint pain or swelling, muscle, back, or extremity pain  HEME/LYMPH: No easy bruising or bleeding gums  ALLERY AND IMMUNOLOGIC: No hives or rash.    Vital Signs Last 24 Hrs  T(C): 37.2 (10 Aug 2018 22:58), Max: 37.2 (10 Aug 2018 22:58)  T(F): 98.9 (10 Aug 2018 22:58), Max: 98.9 (10 Aug 2018 22:58)  HR: 66 (10 Aug 2018 22:58) (62 - 74)  BP: 183/83 (10 Aug 2018 22:58) (151/85 - 209/82)  BP(mean): --  RR: 20 (10 Aug 2018 22:58) (15 - 20)  SpO2: 99% (10 Aug 2018 22:58) (93% - 99%)    PHYSICAL EXAM:  HEAD:  Atraumatic, Normocephalic  EYES: EOMI, PERRLA, conjunctiva and sclera clear  NECK: Supple and normal thyroid.  No JVD or carotid bruit.   HEART: S1, S2 regular , 1/6 soft ejection systolic murmur in mitral area , no thrill and no gallops .  PULMONARY: Bilateral vesicular breathing , few scattered ronchi and few scattered rales are present .  ABDOMEN: Soft nontender and positive bowl sounds   EXTREMITIES:  No clubbing, cyanosis, or pedal  edema  NEUROLOGICAL: AAOX3 , no focal deficit .    LABS:                        13.5   6.79  )-----------( 240      ( 10 Aug 2018 16:48 )             39.9     08-10    142  |  105  |  18  ----------------------------<  106<H>  4.2   |  31  |  0.82    Ca    9.1      10 Aug 2018 16:48    TPro  7.6  /  Alb  3.2<L>  /  TBili  0.5  /  DBili  x   /  AST  19  /  ALT  15  /  AlkPhos  79  08-10    CARDIAC MARKERS ( 10 Aug 2018 03:45 )  .022 ng/mL / x     / 112 U/L / x     / x          PT/INR - ( 09 Aug 2018 12:23 )   PT: 11.8 sec;   INR: 1.08 ratio         PTT - ( 09 Aug 2018 12:23 )  PTT:30.4 sec  Urinalysis Basic - ( 10 Aug 2018 03:05 )    Color: Yellow / Appearance: Clear / S.005 / pH: x  Gluc: x / Ketone: Trace  / Bili: Negative / Urobili: Negative   Blood: x / Protein: 25 mg/dL / Nitrite: Negative   Leuk Esterase: Small / RBC: 0-2 /HPF / WBC 6-10   Sq Epi: x / Non Sq Epi: Occasional / Bacteria: Negative      BNP      EKG:  ECHO:  IMAGING:    Assessment and Plan :     Will continue to follow during hospital course and give further recommendations as needed . Thanks for your referral . if any questions please contact me at office ([phone] [phone]) [facility]     Patient is a 88y old  Female who presents with a chief complaint of     HPI:      HPI:    88y Female for Cardiology Consult    PAST MEDICAL & SURGICAL HISTORY:  Constipation  Asthma  Hyperlipidemia  Hypertension  Anxiety disorder  Delusional disorder  Dementia  No significant past surgical history      FAMILY HISTORY:  No pertinent family history in first degree relatives      SOCIAL HISTORY:   Alcohol:  Smoking:    Allergies    aspirin (Anaphylaxis)  aspirin (Unknown)  mangoes (Unknown)  mangos (Unknown)  shellfish (Unknown)    Intolerances        MEDICATIONS  (STANDING):    MEDICATIONS  (PRN):  Vital Signs Last 24 Hrs  T(C): 37.2 (10 Aug 2018 22:58), Max: 37.2 (10 Aug 2018 22:58)  T(F): 98.9 (10 Aug 2018 22:58), Max: 98.9 (10 Aug 2018 22:58)  HR: 66 (10 Aug 2018 22:58) (62 - 74)  BP: 183/83 (10 Aug 2018 22:58) (151/85 - 209/82)  BP(mean): --  RR: 20 (10 Aug 2018 22:58) (15 - 20)  SpO2: 99% (10 Aug 2018 22:58) (93% - 99%)                        13.5   6.79  )-----------( 240      ( 10 Aug 2018 16:48 )             39.9     08-10    142  |  105  |  18  ----------------------------<  106<H>  4.2   |  31  |  0.82    Ca    9.1      10 Aug 2018 16:48    TPro  7.6  /  Alb  3.2<L>  /  TBili  0.5  /  DBili  x   /  AST  19  /  ALT  15  /  AlkPhos  79  08-10    CARDIAC MARKERS ( 10 Aug 2018 03:45 )  .022 ng/mL / x     / 112 U/L / x     / x          PT/INR - ( 09 Aug 2018 12:23 )   PT: 11.8 sec;   INR: 1.08 ratio         PTT - ( 09 Aug 2018 12:23 )  PTT:30.4 sec  Urinalysis Basic - ( 10 Aug 2018 03:05 )    Color: Yellow / Appearance: Clear / S.005 / pH: x  Gluc: x / Ketone: Trace  / Bili: Negative / Urobili: Negative   Blood: x / Protein: 25 mg/dL / Nitrite: Negative   Leuk Esterase: Small / RBC: 0-2 /HPF / WBC 6-10   Sq Epi: x / Non Sq Epi: Occasional / Bacteria: Negative      BNP      EKG:  ECHO: 1. Normal left ventricular size, wall thickness, and global systolic   function  2. Mild thickening of the mitral valve leaflets with mild to moderate   mitral regurgitation  3. Aortic sclerosis with mild to moderate aortic regurgitation        < end of copied text >    IMAGING:  < end of copied text >  < from: OBSERVATION (17 @ 10:51) >      < end of copied text >      Assessment and Plan :   FULL CONSULT DICTATED .   Patient with H/O Hypertension and has uncontrolled hypertension . Will increase amlodipine 5 mg twice a day and also start Losartan 50 mg once day . Rest of medications as such . Once BP stable and patient can go back to nursing home . Cardiology F/U with me 1-2 weeks .  Will continue to follow during hospital course and give further recommendations as needed . Thanks for your referral . if any questions please contact me at office ([phone] [phone])

## 2018-08-11 NOTE — ED ADULT NURSE REASSESSMENT NOTE - NS ED NURSE REASSESS COMMENT FT1
Pt received from [facility]. Pt denies complaints at this time. BP elevated at 0800, MD made aware, pt medicated for HTN. Call received from pt's daughter who was made aware that pt was supposed to be discharged but was being medicated again for BP, discharge would be determined by recheck. Pt pulled out own IV after being medicated. /78 upon recheck. Call placed to [facility], report given to Domi. Awaiting transport at this time.

## 2018-08-12 LAB
FOLATE SERPL-MCNC: >20 NG/ML — SIGNIFICANT CHANGE UP
VIT B12 SERPL-MCNC: 885 PG/ML — SIGNIFICANT CHANGE UP (ref 232–1245)

## 2018-10-24 PROCEDURE — 84484 ASSAY OF TROPONIN QUANT: CPT

## 2018-10-24 PROCEDURE — 84480 ASSAY TRIIODOTHYRONINE (T3): CPT

## 2018-10-24 PROCEDURE — 93005 ELECTROCARDIOGRAM TRACING: CPT

## 2018-10-24 PROCEDURE — 72170 X-RAY EXAM OF PELVIS: CPT

## 2018-10-24 PROCEDURE — 72192 CT PELVIS W/O DYE: CPT

## 2018-10-24 PROCEDURE — 73502 X-RAY EXAM HIP UNI 2-3 VIEWS: CPT

## 2018-10-24 PROCEDURE — 85730 THROMBOPLASTIN TIME PARTIAL: CPT

## 2018-10-24 PROCEDURE — 99284 EMERGENCY DEPT VISIT MOD MDM: CPT | Mod: 25

## 2018-10-24 PROCEDURE — 87086 URINE CULTURE/COLONY COUNT: CPT

## 2018-10-24 PROCEDURE — 72125 CT NECK SPINE W/O DYE: CPT

## 2018-10-24 PROCEDURE — 84436 ASSAY OF TOTAL THYROXINE: CPT

## 2018-10-24 PROCEDURE — 73552 X-RAY EXAM OF FEMUR 2/>: CPT

## 2018-10-24 PROCEDURE — 81001 URINALYSIS AUTO W/SCOPE: CPT

## 2018-10-24 PROCEDURE — 85610 PROTHROMBIN TIME: CPT

## 2018-10-24 PROCEDURE — 82746 ASSAY OF FOLIC ACID SERUM: CPT

## 2018-10-24 PROCEDURE — 97161 PT EVAL LOW COMPLEX 20 MIN: CPT

## 2018-10-24 PROCEDURE — 80053 COMPREHEN METABOLIC PANEL: CPT

## 2018-10-24 PROCEDURE — 71045 X-RAY EXAM CHEST 1 VIEW: CPT

## 2018-10-24 PROCEDURE — 70486 CT MAXILLOFACIAL W/O DYE: CPT

## 2018-10-24 PROCEDURE — G8979: CPT

## 2018-10-24 PROCEDURE — 83036 HEMOGLOBIN GLYCOSYLATED A1C: CPT

## 2018-10-24 PROCEDURE — 70450 CT HEAD/BRAIN W/O DYE: CPT

## 2018-10-24 PROCEDURE — 82550 ASSAY OF CK (CPK): CPT

## 2018-10-24 PROCEDURE — 73562 X-RAY EXAM OF KNEE 3: CPT

## 2018-10-24 PROCEDURE — 84443 ASSAY THYROID STIM HORMONE: CPT

## 2018-10-24 PROCEDURE — G8978: CPT

## 2018-10-24 PROCEDURE — G8980: CPT

## 2018-10-24 PROCEDURE — 99285 EMERGENCY DEPT VISIT HI MDM: CPT | Mod: 25

## 2018-10-24 PROCEDURE — 96374 THER/PROPH/DIAG INJ IV PUSH: CPT

## 2018-10-24 PROCEDURE — 85027 COMPLETE CBC AUTOMATED: CPT

## 2018-10-24 PROCEDURE — 82607 VITAMIN B-12: CPT

## 2019-09-11 NOTE — PROGRESS NOTE ADULT - PROBLEM/PLAN-3
Order faxed again to [facility].  Contacted patient and let her know that order was faxed again.  
Patient calling contacted [facility] and they didn't receive an order for a motion machine  
DISPLAY PLAN FREE TEXT

## 2019-09-17 NOTE — BEHAVIORAL HEALTH ASSESSMENT NOTE - FAMILY HISTORY OF PSYCHIATRIC ILLNESS / SUICIDALITY
Quality 265: Biopsy Follow-Up: Biopsy results reviewed, communicated, tracked, and documented Detail Level: Detailed None known

## 2019-11-26 NOTE — ED ADULT NURSE NOTE - CHIEF COMPLAINT
The patient is a 88y Female complaining of hypertension. Attending Attestation (For Attendings USE Only)...

## 2020-01-16 ENCOUNTER — EMERGENCY (EMERGENCY)
Facility: HOSPITAL | Age: 85
LOS: 1 days | Discharge: HOME | End: 2020-01-16
Attending: EMERGENCY MEDICINE | Admitting: EMERGENCY MEDICINE
Payer: MEDICARE

## 2020-01-16 ENCOUNTER — TRANSCRIPTION ENCOUNTER (OUTPATIENT)
Age: 85
End: 2020-01-16

## 2020-01-16 VITALS
TEMPERATURE: 97 F | HEART RATE: 78 BPM | RESPIRATION RATE: 18 BRPM | OXYGEN SATURATION: 95 % | SYSTOLIC BLOOD PRESSURE: 162 MMHG | DIASTOLIC BLOOD PRESSURE: 86 MMHG

## 2020-01-16 VITALS
WEIGHT: 160.06 LBS | TEMPERATURE: 98 F | OXYGEN SATURATION: 100 % | RESPIRATION RATE: 56 BRPM | SYSTOLIC BLOOD PRESSURE: 196 MMHG | DIASTOLIC BLOOD PRESSURE: 127 MMHG | HEART RATE: 18 BPM

## 2020-01-16 LAB
ALBUMIN SERPL ELPH-MCNC: 2.9 G/DL — LOW (ref 3.3–5)
ALP SERPL-CCNC: 117 U/L — SIGNIFICANT CHANGE UP (ref 40–120)
ALT FLD-CCNC: 13 U/L — SIGNIFICANT CHANGE UP (ref 12–78)
ANION GAP SERPL CALC-SCNC: 8 MMOL/L — SIGNIFICANT CHANGE UP (ref 5–17)
APTT BLD: 30.1 SEC — SIGNIFICANT CHANGE UP (ref 28.5–37)
AST SERPL-CCNC: 17 U/L — SIGNIFICANT CHANGE UP (ref 15–37)
BILIRUB SERPL-MCNC: 0.3 MG/DL — SIGNIFICANT CHANGE UP (ref 0.2–1.2)
BUN SERPL-MCNC: 15 MG/DL — SIGNIFICANT CHANGE UP (ref 7–23)
CALCIUM SERPL-MCNC: 8.7 MG/DL — SIGNIFICANT CHANGE UP (ref 8.5–10.1)
CHLORIDE SERPL-SCNC: 104 MMOL/L — SIGNIFICANT CHANGE UP (ref 96–108)
CO2 SERPL-SCNC: 29 MMOL/L — SIGNIFICANT CHANGE UP (ref 22–31)
CREAT SERPL-MCNC: 0.77 MG/DL — SIGNIFICANT CHANGE UP (ref 0.5–1.3)
GLUCOSE SERPL-MCNC: 124 MG/DL — HIGH (ref 70–99)
HCT VFR BLD CALC: 38.1 % — SIGNIFICANT CHANGE UP (ref 34.5–45)
HGB BLD-MCNC: 12.5 G/DL — SIGNIFICANT CHANGE UP (ref 11.5–15.5)
INR BLD: 0.99 RATIO — SIGNIFICANT CHANGE UP (ref 0.88–1.16)
MCHC RBC-ENTMCNC: 32.7 PG — SIGNIFICANT CHANGE UP (ref 27–34)
MCHC RBC-ENTMCNC: 32.8 GM/DL — SIGNIFICANT CHANGE UP (ref 32–36)
MCV RBC AUTO: 99.7 FL — SIGNIFICANT CHANGE UP (ref 80–100)
NRBC # BLD: 0 /100 WBCS — SIGNIFICANT CHANGE UP (ref 0–0)
NT-PROBNP SERPL-SCNC: 1219 PG/ML — HIGH (ref 0–450)
PLATELET # BLD AUTO: 275 K/UL — SIGNIFICANT CHANGE UP (ref 150–400)
POTASSIUM SERPL-MCNC: 3.9 MMOL/L — SIGNIFICANT CHANGE UP (ref 3.5–5.3)
POTASSIUM SERPL-SCNC: 3.9 MMOL/L — SIGNIFICANT CHANGE UP (ref 3.5–5.3)
PROT SERPL-MCNC: 7.6 G/DL — SIGNIFICANT CHANGE UP (ref 6–8.3)
PROTHROM AB SERPL-ACNC: 11.3 SEC — SIGNIFICANT CHANGE UP (ref 10–12.9)
RBC # BLD: 3.82 M/UL — SIGNIFICANT CHANGE UP (ref 3.8–5.2)
RBC # FLD: 13.2 % — SIGNIFICANT CHANGE UP (ref 10.3–14.5)
SODIUM SERPL-SCNC: 141 MMOL/L — SIGNIFICANT CHANGE UP (ref 135–145)
TROPONIN I SERPL-MCNC: <.015 NG/ML — SIGNIFICANT CHANGE UP (ref 0.01–0.04)
WBC # BLD: 7.21 K/UL — SIGNIFICANT CHANGE UP (ref 3.8–10.5)
WBC # FLD AUTO: 7.21 K/UL — SIGNIFICANT CHANGE UP (ref 3.8–10.5)

## 2020-01-16 PROCEDURE — 71045 X-RAY EXAM CHEST 1 VIEW: CPT

## 2020-01-16 PROCEDURE — 93010 ELECTROCARDIOGRAM REPORT: CPT

## 2020-01-16 PROCEDURE — 85730 THROMBOPLASTIN TIME PARTIAL: CPT

## 2020-01-16 PROCEDURE — 99284 EMERGENCY DEPT VISIT MOD MDM: CPT | Mod: 25

## 2020-01-16 PROCEDURE — 93005 ELECTROCARDIOGRAM TRACING: CPT

## 2020-01-16 PROCEDURE — 36415 COLL VENOUS BLD VENIPUNCTURE: CPT

## 2020-01-16 PROCEDURE — 83880 ASSAY OF NATRIURETIC PEPTIDE: CPT

## 2020-01-16 PROCEDURE — 99285 EMERGENCY DEPT VISIT HI MDM: CPT

## 2020-01-16 PROCEDURE — 85027 COMPLETE CBC AUTOMATED: CPT

## 2020-01-16 PROCEDURE — 85610 PROTHROMBIN TIME: CPT

## 2020-01-16 PROCEDURE — 71045 X-RAY EXAM CHEST 1 VIEW: CPT | Mod: 26

## 2020-01-16 PROCEDURE — 80053 COMPREHEN METABOLIC PANEL: CPT

## 2020-01-16 PROCEDURE — 84484 ASSAY OF TROPONIN QUANT: CPT

## 2020-01-16 RX ORDER — FUROSEMIDE 40 MG
40 TABLET ORAL ONCE
Refills: 0 | Status: COMPLETED | OUTPATIENT
Start: 2020-01-16 | End: 2020-01-16

## 2020-01-16 RX ORDER — NITROGLYCERIN 6.5 MG
0.4 CAPSULE, EXTENDED RELEASE ORAL ONCE
Refills: 0 | Status: COMPLETED | OUTPATIENT
Start: 2020-01-16 | End: 2020-01-16

## 2020-01-16 RX ADMIN — Medication 0.4 MILLIGRAM(S): at 11:10

## 2020-01-16 RX ADMIN — Medication 40 MILLIGRAM(S): at 11:08

## 2020-01-16 NOTE — ED ADULT NURSE NOTE - OBJECTIVE STATEMENT
This is a 89 y/o female received via EMS coming from [facility], per EMS, patient had difficulty breathing and elevated blood pressure with low O2 89%. She denies any fever, chills, vomiting, diarrhea. Patient is placed on cardiac monitor. She is refusing anyone at present to touch her and only wants a Doctor  to touch her. Will monitor support and safety maintained.

## 2020-01-16 NOTE — ED PROVIDER NOTE - PHYSICAL EXAMINATION
Gen:  alert, awake, no acute distress  HEENT:  atraumatic head, airway clear, pupils equal and round  CV:  tachy, nl S1, S2, no m/r/g  Pulm:  lungs w rales b/l to mid lung fields  Abd: s/nt/nd, +BS  Ext:  moving all extremities, b/l LE edema  Neuro:  grossly intact, no focal deficits  Skin:  clear, dry, intact

## 2020-01-16 NOTE — ED PROVIDER NOTE - NSFOLLOWUPINSTRUCTIONS_ED_ALL_ED_FT
ambulatory -- Continue with lasix daily 20 mg as prescribed    -- Return to the ER for worsening or persistent symptoms, and/or ANY NEW OR CONCERNING SYMPTOMS.    -- If you have difficulty following up, please call: [phone] to obtain a [facility] doctor or specialist who takes your insurance in your area.

## 2020-01-16 NOTE — ED PROVIDER NOTE - OBJECTIVE STATEMENT
pt sent from SNF for sob in setting of 10 lb weight gain and increased b/l LE edema.  pt unable to provide any HPI 2/2 dementia

## 2020-01-16 NOTE — ED ADULT TRIAGE NOTE - MEANS OF ARRIVAL
HPI  
Pt reports intractable non bloody vomiting since Friday evening. She was evaluated by her PCP today and sent for further evaluation. Pt reports chills but unsure of fever and epigastric area pain with one episode of non bloody diarrhea. Denies any difficulty breathing, difficulty swallowing or chest pain. Pt. Reports that she has not had any food or medications today prior to arrival. 
Old charts reviewed Past Medical History:  
Diagnosis Date  Adverse effect of anesthesia   
 woke during surgical procedure with MAC and cath  Allergic rhinitis  Calculus 2.28/11 EXCESS URIC ACID; HAS STONES CURRENTLY  Chronic pain  Color vision deficiency  Depression  Diabetes ([facility] [address]  Hypercholesterolemia  Hypertension  Incisional hernia 10/17/2012  Pancreatitis 1969 CYST ON PANCREAS BURST BEFORE SURG FOR REMOVAL  Trigger thumb of left hand   
 and right hand Past Surgical History:  
Procedure Laterality Date  ABDOMEN SURGERY PROC UNLISTED  2014  
 hernia repair  CARDIAC SURG PROCEDURE UNLIST  2002 CARDIAC CATH FOR PALPITATIONS  
 HX CATARACT REMOVAL Bilateral 2015 Colleen Blackwell  HX COLONOSCOPY    
 HX GI    
 pancreatitis; CYST  
 HX GI  05/29/15  
 hernia repair, #2  
 HX GI  2014 HERNIA REPAIR #1  
[facility]  [id number]  
 incisional hernia repair with component separation  HX GYN    
 5 [facility]  HX HEENT    
 LASER SURGERY BOTH EYES  
 HX HEENT  7-2-15  
 left catarac surgery  HX HEENT  7-16-15  
 right catarac surgery  HX HERNIA REPAIR  1-14-16  
 recurrent ventral incisional hernia repair-[facility]-Dr. Hernán Kim  HX HERNIA REPAIR  2015  HX HERNIA REPAIR  08/01/2016  
 hernia mesh repair [address]  HX OOPHORECTOMY Bilateral 3/11  
 benign ovarian cyst  
 HX ORTHOPAEDIC  1982  TUMOR EXCISED L MIDDLE FINGER  
 JOSE [address] MOLES EXCISED-FACE & BACK (WOKE DURING)  HX OTHER SURGICAL  08/04/2016  
 exploratory lap, DIONNE  HX OVARIAN CYST REMOVAL  3/2011  HX SKIN BIOPSY    
 moles on back (size of dime) and some on face removed Family History:  
Problem Relation Age of Onset  Lung Disease Mother   
     copd  Cancer Father LUNG, LIVER, skin  Other Brother ACROMEGALY  Cancer Brother   
     melanoma  Anesth Problems Neg Hx Social History Socioeconomic History  Marital status:  Spouse name: Not on file  Number of children: Not on file  Years of education: Not on file  Highest education level: Not on file Occupational History  Not on file Social Needs  Financial resource strain: Not on file  Food insecurity:  
  Worry: Not on file Inability: Not on file  Transportation needs:  
  Medical: Not on file Non-medical: Not on file Tobacco Use  Smoking status: Never Smoker  Smokeless tobacco: Never Used Substance and Sexual Activity  Alcohol use: No  
 Drug use: No  
 Sexual activity: Yes  
  Partners: Male Birth control/protection: None, Surgical  
Lifestyle  Physical activity:  
  Days per week: Not on file Minutes per session: Not on file  Stress: Not on file Relationships  Social connections:  
  Talks on phone: Not on file Gets together: Not on file Attends [employer] service: Not on file Active member of club or organization: Not on file Attends meetings of clubs or organizations: Not on file Relationship status: Not on file  Intimate partner violence:  
  Fear of current or ex partner: Not on file Emotionally abused: Not on file Physically abused: Not on file Forced sexual activity: Not on file Other Topics Concern  Not on file Social History Narrative  Not on file ALLERGIES: Adhesive tape; Contrast agent [iodine]; Pcn [penicillins]; Levaquin [levofloxacin]; and Other medication Review of Systems Constitutional: Positive for activity change, appetite change and chills. Negative for fever and unexpected weight change. HENT: Negative for congestion, trouble swallowing and voice change. Respiratory: Negative for cough and shortness of breath. Cardiovascular: Negative for chest pain, palpitations and leg swelling. Gastrointestinal: Positive for abdominal pain, diarrhea, nausea and vomiting. Genitourinary: Negative for difficulty urinating and dysuria. Musculoskeletal: Negative for arthralgias and myalgias. Skin: Negative for rash. Neurological: Negative for dizziness and headaches. All other systems reviewed and are negative. Vitals:  
 04/16/19 1248 04/16/19 1312 BP:  181/73 Pulse: 80 85 Resp:  18 Temp:  100 °F (37.8 °C) SpO2: 93% 96% Weight:  92.6 kg (204 lb 2.3 oz) Height:  5' 4\" (1.626 m) Physical Exam  
Constitutional: She is oriented to person, place, and time. Obese white female; non smoker HENT:  
Right Ear: External ear normal.  
Left Ear: External ear normal.  
Nose: Nose normal.  
Mouth/Throat: Oropharynx is clear and moist.  
Neck: Normal range of motion. Neck supple. Cardiovascular: Normal rate and regular rhythm. Pulmonary/Chest: Effort normal and breath sounds normal.  
Abdominal: Soft. Bowel sounds are normal. There is tenderness. Musculoskeletal: Normal range of motion. Lymphadenopathy:  
  She has no cervical adenopathy. Neurological: She is alert and oriented to person, place, and time. Skin: Skin is warm and dry. No rash noted. Psychiatric: She has a normal mood and affect. Nursing note and vitals reviewed. MDM Procedures Pt ha been re-examined and is tolerating fluids well. Discussed the need for a  CT thorax after her pneumonia has resolved. Patient's results and plan of care have been reviewed with her. Patient and/or family have verbally conveyed their understanding and agreement of the patient's signs, symptoms, diagnosis, treatment and prognosis and additionally agrees to follow up as recommended or return to the Emergency Room should her condition change prior to follow-up. Discharge instructions have also been provided to the patient with some educational information regarding her diagnosis as well a list of reasons why she would want to return to the ER prior to her follow-up appointment should her condition change. Sheyla Ashley NP 
 
 
 
  
 
 
 
 
 
 
 
 stretcher

## 2020-01-16 NOTE — ED PROVIDER NOTE - PATIENT PORTAL LINK FT
You can access the FollowMyHealth Patient Portal offered by [facility] by registering at the following website: [email or web address]. By joining [employer]’s FollowMyHealth portal, you will also be able to view your health information using other applications (apps) compatible with our system.

## 2020-01-16 NOTE — ED ADULT NURSE REASSESSMENT NOTE - NS ED NURSE REASSESS COMMENT FT1
Patient refused to remove her clothing and refuse all vital signs. She is  placed in position of comfort, given warm blankets. safety maintained. will continue to monitor.

## 2020-01-30 ENCOUNTER — INPATIENT (INPATIENT)
Facility: HOSPITAL | Age: 85
LOS: 8 days | Discharge: HOSPICE | DRG: 872 | End: 2020-02-08
Attending: INTERNAL MEDICINE | Admitting: INTERNAL MEDICINE
Payer: MEDICARE

## 2020-01-30 VITALS
DIASTOLIC BLOOD PRESSURE: 69 MMHG | TEMPERATURE: 98 F | SYSTOLIC BLOOD PRESSURE: 151 MMHG | HEART RATE: 53 BPM | WEIGHT: 136.91 LBS | RESPIRATION RATE: 15 BRPM | HEIGHT: 62 IN | OXYGEN SATURATION: 98 %

## 2020-01-30 DIAGNOSIS — J45.909 UNSPECIFIED ASTHMA, UNCOMPLICATED: ICD-10-CM

## 2020-01-30 DIAGNOSIS — F22 DELUSIONAL DISORDERS: ICD-10-CM

## 2020-01-30 DIAGNOSIS — A41.9 SEPSIS, UNSPECIFIED ORGANISM: ICD-10-CM

## 2020-01-30 DIAGNOSIS — F03.90 UNSPECIFIED DEMENTIA WITHOUT BEHAVIORAL DISTURBANCE: ICD-10-CM

## 2020-01-30 DIAGNOSIS — F41.9 ANXIETY DISORDER, UNSPECIFIED: ICD-10-CM

## 2020-01-30 DIAGNOSIS — I10 ESSENTIAL (PRIMARY) HYPERTENSION: ICD-10-CM

## 2020-01-30 DIAGNOSIS — Z29.9 ENCOUNTER FOR PROPHYLACTIC MEASURES, UNSPECIFIED: ICD-10-CM

## 2020-01-30 DIAGNOSIS — R50.9 FEVER, UNSPECIFIED: ICD-10-CM

## 2020-01-30 DIAGNOSIS — K59.00 CONSTIPATION, UNSPECIFIED: ICD-10-CM

## 2020-01-30 DIAGNOSIS — N39.0 URINARY TRACT INFECTION, SITE NOT SPECIFIED: ICD-10-CM

## 2020-01-30 DIAGNOSIS — N17.9 ACUTE KIDNEY FAILURE, UNSPECIFIED: ICD-10-CM

## 2020-01-30 DIAGNOSIS — Z71.89 OTHER SPECIFIED COUNSELING: ICD-10-CM

## 2020-01-30 LAB
ALBUMIN SERPL ELPH-MCNC: 3 G/DL — LOW (ref 3.3–5)
ALP SERPL-CCNC: 105 U/L — SIGNIFICANT CHANGE UP (ref 40–120)
ALT FLD-CCNC: 19 U/L — SIGNIFICANT CHANGE UP (ref 12–78)
ANION GAP SERPL CALC-SCNC: 10 MMOL/L — SIGNIFICANT CHANGE UP (ref 5–17)
APPEARANCE UR: ABNORMAL
APTT BLD: 29.9 SEC — SIGNIFICANT CHANGE UP (ref 28.5–37)
AST SERPL-CCNC: 38 U/L — HIGH (ref 15–37)
BACTERIA # UR AUTO: ABNORMAL
BASOPHILS # BLD AUTO: 0.02 K/UL — SIGNIFICANT CHANGE UP (ref 0–0.2)
BASOPHILS NFR BLD AUTO: 0.1 % — SIGNIFICANT CHANGE UP (ref 0–2)
BILIRUB SERPL-MCNC: 1.1 MG/DL — SIGNIFICANT CHANGE UP (ref 0.2–1.2)
BILIRUB UR-MCNC: NEGATIVE — SIGNIFICANT CHANGE UP
BUN SERPL-MCNC: 43 MG/DL — HIGH (ref 7–23)
CALCIUM SERPL-MCNC: 9.4 MG/DL — SIGNIFICANT CHANGE UP (ref 8.5–10.1)
CHLORIDE SERPL-SCNC: 100 MMOL/L — SIGNIFICANT CHANGE UP (ref 96–108)
CO2 SERPL-SCNC: 29 MMOL/L — SIGNIFICANT CHANGE UP (ref 22–31)
COLOR SPEC: YELLOW — SIGNIFICANT CHANGE UP
CREAT SERPL-MCNC: 2.5 MG/DL — HIGH (ref 0.5–1.3)
DIFF PNL FLD: ABNORMAL
EOSINOPHIL # BLD AUTO: 0.01 K/UL — SIGNIFICANT CHANGE UP (ref 0–0.5)
EOSINOPHIL NFR BLD AUTO: 0.1 % — SIGNIFICANT CHANGE UP (ref 0–6)
EPI CELLS # UR: SIGNIFICANT CHANGE UP
FLU A RESULT: SIGNIFICANT CHANGE UP
FLU A RESULT: SIGNIFICANT CHANGE UP
FLUAV AG NPH QL: SIGNIFICANT CHANGE UP
FLUBV AG NPH QL: SIGNIFICANT CHANGE UP
GLUCOSE SERPL-MCNC: 185 MG/DL — HIGH (ref 70–99)
GLUCOSE UR QL: NEGATIVE — SIGNIFICANT CHANGE UP
HCT VFR BLD CALC: 37 % — SIGNIFICANT CHANGE UP (ref 34.5–45)
HGB BLD-MCNC: 12.3 G/DL — SIGNIFICANT CHANGE UP (ref 11.5–15.5)
IMM GRANULOCYTES NFR BLD AUTO: 1.1 % — SIGNIFICANT CHANGE UP (ref 0–1.5)
INR BLD: 1.26 RATIO — HIGH (ref 0.88–1.16)
KETONES UR-MCNC: ABNORMAL
LACTATE SERPL-SCNC: 2.4 MMOL/L — HIGH (ref 0.7–2)
LACTATE SERPL-SCNC: 4.2 MMOL/L — CRITICAL HIGH (ref 0.7–2)
LACTATE SERPL-SCNC: 5.7 MMOL/L — CRITICAL HIGH (ref 0.7–2)
LEUKOCYTE ESTERASE UR-ACNC: ABNORMAL
LYMPHOCYTES # BLD AUTO: 0.58 K/UL — LOW (ref 1–3.3)
LYMPHOCYTES # BLD AUTO: 4.1 % — LOW (ref 13–44)
MCHC RBC-ENTMCNC: 33 PG — SIGNIFICANT CHANGE UP (ref 27–34)
MCHC RBC-ENTMCNC: 33.2 GM/DL — SIGNIFICANT CHANGE UP (ref 32–36)
MCV RBC AUTO: 99.2 FL — SIGNIFICANT CHANGE UP (ref 80–100)
MONOCYTES # BLD AUTO: 0.89 K/UL — SIGNIFICANT CHANGE UP (ref 0–0.9)
MONOCYTES NFR BLD AUTO: 6.2 % — SIGNIFICANT CHANGE UP (ref 2–14)
NEUTROPHILS # BLD AUTO: 12.59 K/UL — HIGH (ref 1.8–7.4)
NEUTROPHILS NFR BLD AUTO: 88.4 % — HIGH (ref 43–77)
NITRITE UR-MCNC: NEGATIVE — SIGNIFICANT CHANGE UP
NRBC # BLD: 0 /100 WBCS — SIGNIFICANT CHANGE UP (ref 0–0)
PH UR: 5 — SIGNIFICANT CHANGE UP (ref 5–8)
PLATELET # BLD AUTO: 195 K/UL — SIGNIFICANT CHANGE UP (ref 150–400)
POTASSIUM SERPL-MCNC: 4 MMOL/L — SIGNIFICANT CHANGE UP (ref 3.5–5.3)
POTASSIUM SERPL-SCNC: 4 MMOL/L — SIGNIFICANT CHANGE UP (ref 3.5–5.3)
PROT SERPL-MCNC: 8.4 G/DL — HIGH (ref 6–8.3)
PROT UR-MCNC: 500 MG/DL
PROTHROM AB SERPL-ACNC: 14.2 SEC — HIGH (ref 10–12.9)
RBC # BLD: 3.73 M/UL — LOW (ref 3.8–5.2)
RBC # FLD: 14.5 % — SIGNIFICANT CHANGE UP (ref 10.3–14.5)
RBC CASTS # UR COMP ASSIST: ABNORMAL /HPF (ref 0–4)
RSV RESULT: SIGNIFICANT CHANGE UP
RSV RNA RESP QL NAA+PROBE: SIGNIFICANT CHANGE UP
SODIUM SERPL-SCNC: 139 MMOL/L — SIGNIFICANT CHANGE UP (ref 135–145)
SP GR SPEC: 1.01 — SIGNIFICANT CHANGE UP (ref 1.01–1.02)
UROBILINOGEN FLD QL: NEGATIVE — SIGNIFICANT CHANGE UP
WBC # BLD: 14.25 K/UL — HIGH (ref 3.8–10.5)
WBC # FLD AUTO: 14.25 K/UL — HIGH (ref 3.8–10.5)
WBC UR QL: ABNORMAL

## 2020-01-30 PROCEDURE — 93010 ELECTROCARDIOGRAM REPORT: CPT

## 2020-01-30 PROCEDURE — 99285 EMERGENCY DEPT VISIT HI MDM: CPT

## 2020-01-30 PROCEDURE — 71045 X-RAY EXAM CHEST 1 VIEW: CPT | Mod: 26

## 2020-01-30 RX ORDER — POLYETHYLENE GLYCOL 3350 17 G/17G
17 POWDER, FOR SOLUTION ORAL
Refills: 0 | Status: DISCONTINUED | OUTPATIENT
Start: 2020-01-30 | End: 2020-02-08

## 2020-01-30 RX ORDER — FUROSEMIDE 40 MG
1 TABLET ORAL
Qty: 0 | Refills: 0 | DISCHARGE

## 2020-01-30 RX ORDER — QUETIAPINE FUMARATE 200 MG/1
0 TABLET, FILM COATED ORAL
Qty: 0 | Refills: 0 | DISCHARGE

## 2020-01-30 RX ORDER — SENNA PLUS 8.6 MG/1
2 TABLET ORAL AT BEDTIME
Refills: 0 | Status: DISCONTINUED | OUTPATIENT
Start: 2020-01-30 | End: 2020-02-08

## 2020-01-30 RX ORDER — DIVALPROEX SODIUM 500 MG/1
125 TABLET, DELAYED RELEASE ORAL
Refills: 0 | Status: DISCONTINUED | OUTPATIENT
Start: 2020-01-30 | End: 2020-02-04

## 2020-01-30 RX ORDER — LEVOTHYROXINE SODIUM 125 MCG
1 TABLET ORAL
Qty: 0 | Refills: 0 | DISCHARGE

## 2020-01-30 RX ORDER — CEFTRIAXONE 500 MG/1
1000 INJECTION, POWDER, FOR SOLUTION INTRAMUSCULAR; INTRAVENOUS EVERY 24 HOURS
Refills: 0 | Status: DISCONTINUED | OUTPATIENT
Start: 2020-01-30 | End: 2020-02-02

## 2020-01-30 RX ORDER — ALPRAZOLAM 0.25 MG
0.25 TABLET ORAL
Refills: 0 | Status: DISCONTINUED | OUTPATIENT
Start: 2020-01-30 | End: 2020-01-30

## 2020-01-30 RX ORDER — SODIUM CHLORIDE 9 MG/ML
1000 INJECTION, SOLUTION INTRAVENOUS
Refills: 0 | Status: DISCONTINUED | OUTPATIENT
Start: 2020-01-30 | End: 2020-02-04

## 2020-01-30 RX ORDER — QUETIAPINE FUMARATE 200 MG/1
25 TABLET, FILM COATED ORAL
Refills: 0 | Status: DISCONTINUED | OUTPATIENT
Start: 2020-01-30 | End: 2020-02-08

## 2020-01-30 RX ORDER — BUDESONIDE, MICRONIZED 100 %
0.5 POWDER (GRAM) MISCELLANEOUS
Refills: 0 | Status: DISCONTINUED | OUTPATIENT
Start: 2020-01-30 | End: 2020-02-04

## 2020-01-30 RX ORDER — LACTOBACILLUS ACIDOPHILUS 100MM CELL
1 CAPSULE ORAL EVERY 8 HOURS
Refills: 0 | Status: DISCONTINUED | OUTPATIENT
Start: 2020-01-30 | End: 2020-02-04

## 2020-01-30 RX ORDER — ALPRAZOLAM 0.25 MG
0.25 TABLET ORAL
Qty: 0 | Refills: 0 | DISCHARGE

## 2020-01-30 RX ORDER — SODIUM CHLORIDE 9 MG/ML
1000 INJECTION INTRAMUSCULAR; INTRAVENOUS; SUBCUTANEOUS ONCE
Refills: 0 | Status: COMPLETED | OUTPATIENT
Start: 2020-01-30 | End: 2020-01-30

## 2020-01-30 RX ORDER — ACETAMINOPHEN 500 MG
650 TABLET ORAL EVERY 4 HOURS
Refills: 0 | Status: DISCONTINUED | OUTPATIENT
Start: 2020-01-30 | End: 2020-01-30

## 2020-01-30 RX ORDER — FAMOTIDINE 10 MG/ML
20 INJECTION INTRAVENOUS DAILY
Refills: 0 | Status: DISCONTINUED | OUTPATIENT
Start: 2020-01-30 | End: 2020-02-04

## 2020-01-30 RX ORDER — HEPARIN SODIUM 5000 [USP'U]/ML
5000 INJECTION INTRAVENOUS; SUBCUTANEOUS EVERY 12 HOURS
Refills: 0 | Status: DISCONTINUED | OUTPATIENT
Start: 2020-01-30 | End: 2020-02-04

## 2020-01-30 RX ORDER — DIVALPROEX SODIUM 500 MG/1
0 TABLET, DELAYED RELEASE ORAL
Qty: 0 | Refills: 0 | DISCHARGE

## 2020-01-30 RX ORDER — LEVOTHYROXINE SODIUM 125 MCG
100 TABLET ORAL DAILY
Refills: 0 | Status: DISCONTINUED | OUTPATIENT
Start: 2020-01-30 | End: 2020-02-04

## 2020-01-30 RX ORDER — SODIUM CHLORIDE 9 MG/ML
1000 INJECTION INTRAMUSCULAR; INTRAVENOUS; SUBCUTANEOUS
Refills: 0 | Status: DISCONTINUED | OUTPATIENT
Start: 2020-01-30 | End: 2020-01-30

## 2020-01-30 RX ORDER — CEFTRIAXONE 500 MG/1
1000 INJECTION, POWDER, FOR SOLUTION INTRAMUSCULAR; INTRAVENOUS ONCE
Refills: 0 | Status: COMPLETED | OUTPATIENT
Start: 2020-01-30 | End: 2020-01-30

## 2020-01-30 RX ORDER — HALOPERIDOL DECANOATE 100 MG/ML
0.5 INJECTION INTRAMUSCULAR EVERY 6 HOURS
Refills: 0 | Status: DISCONTINUED | OUTPATIENT
Start: 2020-01-30 | End: 2020-02-08

## 2020-01-30 RX ORDER — AZITHROMYCIN 500 MG/1
500 TABLET, FILM COATED ORAL ONCE
Refills: 0 | Status: COMPLETED | OUTPATIENT
Start: 2020-01-30 | End: 2020-01-30

## 2020-01-30 RX ORDER — METOPROLOL TARTRATE 50 MG
50 TABLET ORAL
Refills: 0 | Status: DISCONTINUED | OUTPATIENT
Start: 2020-01-30 | End: 2020-02-04

## 2020-01-30 RX ORDER — FOLIC ACID 0.8 MG
1 TABLET ORAL DAILY
Refills: 0 | Status: DISCONTINUED | OUTPATIENT
Start: 2020-01-30 | End: 2020-02-04

## 2020-01-30 RX ORDER — IPRATROPIUM/ALBUTEROL SULFATE 18-103MCG
3 AEROSOL WITH ADAPTER (GRAM) INHALATION EVERY 6 HOURS
Refills: 0 | Status: DISCONTINUED | OUTPATIENT
Start: 2020-01-30 | End: 2020-02-04

## 2020-01-30 RX ORDER — METOPROLOL TARTRATE 50 MG
1 TABLET ORAL
Qty: 0 | Refills: 0 | DISCHARGE

## 2020-01-30 RX ORDER — ACETAMINOPHEN 500 MG
650 TABLET ORAL ONCE
Refills: 0 | Status: COMPLETED | OUTPATIENT
Start: 2020-01-30 | End: 2020-01-30

## 2020-01-30 RX ORDER — ACETAMINOPHEN 500 MG
650 TABLET ORAL EVERY 4 HOURS
Refills: 0 | Status: DISCONTINUED | OUTPATIENT
Start: 2020-01-30 | End: 2020-02-04

## 2020-01-30 RX ORDER — SODIUM CHLORIDE 9 MG/ML
500 INJECTION INTRAMUSCULAR; INTRAVENOUS; SUBCUTANEOUS ONCE
Refills: 0 | Status: COMPLETED | OUTPATIENT
Start: 2020-01-30 | End: 2020-01-30

## 2020-01-30 RX ADMIN — SODIUM CHLORIDE 500 MILLILITER(S): 9 INJECTION INTRAMUSCULAR; INTRAVENOUS; SUBCUTANEOUS at 15:46

## 2020-01-30 RX ADMIN — Medication 650 MILLIGRAM(S): at 16:45

## 2020-01-30 RX ADMIN — HEPARIN SODIUM 5000 UNIT(S): 5000 INJECTION INTRAVENOUS; SUBCUTANEOUS at 18:38

## 2020-01-30 RX ADMIN — Medication 1 MILLIGRAM(S): at 18:39

## 2020-01-30 RX ADMIN — POLYETHYLENE GLYCOL 3350 17 GRAM(S): 17 POWDER, FOR SOLUTION ORAL at 18:38

## 2020-01-30 RX ADMIN — QUETIAPINE FUMARATE 25 MILLIGRAM(S): 200 TABLET, FILM COATED ORAL at 18:39

## 2020-01-30 RX ADMIN — FAMOTIDINE 20 MILLIGRAM(S): 10 INJECTION INTRAVENOUS at 18:38

## 2020-01-30 RX ADMIN — CEFTRIAXONE 100 MILLIGRAM(S): 500 INJECTION, POWDER, FOR SOLUTION INTRAMUSCULAR; INTRAVENOUS at 11:21

## 2020-01-30 RX ADMIN — AZITHROMYCIN 255 MILLIGRAM(S): 500 TABLET, FILM COATED ORAL at 11:38

## 2020-01-30 RX ADMIN — Medication 650 MILLIGRAM(S): at 11:11

## 2020-01-30 RX ADMIN — Medication 1 TABLET(S): at 21:29

## 2020-01-30 RX ADMIN — Medication 650 MILLIGRAM(S): at 16:05

## 2020-01-30 RX ADMIN — Medication 0.5 MILLIGRAM(S): at 20:16

## 2020-01-30 RX ADMIN — SODIUM CHLORIDE 1000 MILLILITER(S): 9 INJECTION INTRAMUSCULAR; INTRAVENOUS; SUBCUTANEOUS at 12:36

## 2020-01-30 RX ADMIN — DIVALPROEX SODIUM 125 MILLIGRAM(S): 500 TABLET, DELAYED RELEASE ORAL at 18:39

## 2020-01-30 RX ADMIN — SODIUM CHLORIDE 1000 MILLILITER(S): 9 INJECTION INTRAMUSCULAR; INTRAVENOUS; SUBCUTANEOUS at 11:21

## 2020-01-30 RX ADMIN — Medication 50 MILLIGRAM(S): at 18:39

## 2020-01-30 RX ADMIN — SODIUM CHLORIDE 80 MILLILITER(S): 9 INJECTION, SOLUTION INTRAVENOUS at 17:38

## 2020-01-30 RX ADMIN — SENNA PLUS 2 TABLET(S): 8.6 TABLET ORAL at 21:29

## 2020-01-30 RX ADMIN — Medication 3 MILLILITER(S): at 20:16

## 2020-01-30 NOTE — CONSULT NOTE ADULT - PROBLEM SELECTOR RECOMMENDATION 9
ACUTE RENAL FAILURE: due to sepsis   continue with iv fluid   Serum creatinine is  at  2.5    , approximating GFR at   ml/min.   There is no progression . No uremic symptoms  No evidence of anemia .  Fluid status stable.  Will continue to avoid nephrotoxic drugs.  Patient remains asymptomatic.   Continue current therapy.  hold  diuretic.  hold   ACE inhibitor.  hold   ARB.  Additional evaluation:   ECG,    echocardiogram,     CXR,  will obtained recent   renal ultrasound to evalaute kidney size and possible stones ,

## 2020-01-30 NOTE — SWALLOW BEDSIDE ASSESSMENT ADULT - ASR SWALLOW ASPIRATION MONITOR
upper respiratory infection/fever/pneumonia/oral hygiene/throat clearing/change of breathing pattern/position upright (89Y)/cough/gurgly voice

## 2020-01-30 NOTE — CONSULT NOTE ADULT - SUBJECTIVE AND OBJECTIVE BOX
Date/Time Patient Seen:  		  Referring MD:   Data Reviewed	       Patient is a 89y old  Female who presents with a chief complaint of FEVER (30 Jan 2020 19:42)      Subjective/HPI     PAST MEDICAL & SURGICAL HISTORY:  Constipation  Asthma  Hyperlipidemia  Hypertension  Anxiety disorder  Delusional disorder  Hyperlipidemia  Dementia  No significant past surgical history        Medication list         MEDICATIONS  (STANDING):  albuterol/ipratropium for Nebulization 3 milliLiter(s) Nebulizer every 6 hours  buDESOnide    Inhalation Suspension 0.5 milliGRAM(s) Inhalation two times a day  cefTRIAXone   IVPB 1000 milliGRAM(s) IV Intermittent every 24 hours  diVALproex Sprinkle 125 milliGRAM(s) Oral two times a day  famotidine    Tablet 20 milliGRAM(s) Oral daily  folic acid 1 milliGRAM(s) Oral daily  heparin  Injectable 5000 Unit(s) SubCutaneous every 12 hours  lactated ringers. 1000 milliLiter(s) (80 mL/Hr) IV Continuous <Continuous>  lactobacillus acidophilus 1 Tablet(s) Oral every 8 hours  levothyroxine 100 MICROGram(s) Oral daily  metoprolol succinate ER 50 milliGRAM(s) Oral two times a day  polyethylene glycol 3350 17 Gram(s) Oral two times a day  QUEtiapine 25 milliGRAM(s) Oral two times a day  senna 2 Tablet(s) Oral at bedtime    MEDICATIONS  (PRN):  acetaminophen   Tablet .. 650 milliGRAM(s) Oral every 4 hours PRN Temp greater or equal to 38C (100.4F), Mild Pain (1 - 3)  ALPRAZolam 0.25 milliGRAM(s) Oral two times a day PRN agitation  haloperidol    Injectable 0.5 milliGRAM(s) IntraMuscular every 6 hours PRN Agitation ,delusions         Vitals log        ICU Vital Signs Last 24 Hrs  T(C): 38.6 (30 Jan 2020 16:25), Max: 39.4 (30 Jan 2020 10:03)  T(F): 101.5 (30 Jan 2020 16:25), Max: 103 (30 Jan 2020 10:03)  HR: 68 (30 Jan 2020 20:18) (53 - 82)  BP: 168/74 (30 Jan 2020 16:25) (135/68 - 168/74)  BP(mean): --  ABP: --  ABP(mean): --  RR: 18 (30 Jan 2020 16:40) (15 - 20)  SpO2: 96% (30 Jan 2020 20:18) (87% - 98%)           Input and Output:  I&O's Detail    30 Jan 2020 07:01  -  30 Jan 2020 20:56  --------------------------------------------------------  IN:    lactated ringers.: 240 mL    Sodium Chloride 0.9% IV Bolus: 500 mL  Total IN: 740 mL    OUT:  Total OUT: 0 mL    Total NET: 740 mL          Lab Data                        12.3   14.25 )-----------( 195      ( 30 Jan 2020 11:10 )             37.0     01-30    139  |  100  |  43<H>  ----------------------------<  185<H>  4.0   |  29  |  2.50<H>    Ca    9.4      30 Jan 2020 11:10    TPro  8.4<H>  /  Alb  3.0<L>  /  TBili  1.1  /  DBili  x   /  AST  38<H>  /  ALT  19  /  AlkPhos  105  01-30            Review of Systems	      Objective     Physical Examination        Pertinent Lab findings & Imaging      Cally:  NO   Adequate UO     I&O's Detail    30 Jan 2020 07:01  -  30 Jan 2020 20:56  --------------------------------------------------------  IN:    lactated ringers.: 240 mL    Sodium Chloride 0.9% IV Bolus: 500 mL  Total IN: 740 mL    OUT:  Total OUT: 0 mL    Total NET: 740 mL               Discussed with:     Cultures:	        Radiology

## 2020-01-30 NOTE — H&P ADULT - HEMATOLOGY/LYMPHATICS
not applicable O-L Flap Text: The defect edges were debeveled with a #15 scalpel blade.  Given the location of the defect, shape of the defect and the proximity to free margins an O-L flap was deemed most appropriate.  Using a sterile surgical marker, an appropriate advancement flap was drawn incorporating the defect and placing the expected incisions within the relaxed skin tension lines where possible.    The area thus outlined was incised deep to adipose tissue with a #15 scalpel blade.  The skin margins were undermined to an appropriate distance in all directions utilizing iris scissors.

## 2020-01-30 NOTE — ED PROVIDER NOTE - LAB INTERPRETATION
FLU A B RSV Detection by PCR (01.30.20 @ 11:31)    Flu A Result: NotDetec: The Flu A B RSV assay is a Real-Time PCR test for the qualitative  detection and differentiation of Influenza A, Influenza B, and  Respiratory Syncytial Virus on nasopharyngeal swabs. The results should  be interpreted in the context of all clinical and laboratory findings.    Flu B Result: NotDetec    RSV Result: NotDetec

## 2020-01-30 NOTE — H&P ADULT - ASSESSMENT
89 yo WF , LUIZ resident with medical hx of  Anxiety disorder  ,Asthma  ,Constipation  ,Delusional disorder  ,Dementia  ,Hyperlipidemia    Hypertension. bib ems for fever. No further hx available due to dementia  .Urosepsis vs aspiration pneumonia suspected Admitted for septic workup and evaluation ,send blood and urine cx,serial lactate levels ,monitor vitals closely hydration ,monitor urine output and renal profile ,iv abx initiated. Pulmonology evaluation obtained Patient found to have lactate elevation and septic workup was sent Palliative care consult requested ,to discuss advance directives and complete MOLST

## 2020-01-30 NOTE — CONSULT NOTE ADULT - ASSESSMENT
PATIENT DATA ASSESSMENT PLAN                                     RESP GAS EXCHANGE 2020 RA 96%  Target PO 90-95%  HEMODYNAMICS 2020 /69 Target MAP 65     INFECTION LIKELY SEC TO UTI   FEVER 103 POA 2020   LEUKOCYTOSIS POA 2020 W 14.2    UTI POA 2020 UA W 25-50 R 25-50 Blod larg Nitr n L estr Mod bact tntc  LACTICEMIA POA 2020 l 2020 LA 5.7   a/r Rx with Rocephin iv fluids Monitor LA BP Target MAP 65 Check rvp       ATELECTASIS POA 2020 CXR Mild R base atelectasis    Inc rabia      HENRIK POA 2020 Cr  Cr 0.7-2.5   a/r IV fluids Serial monitoring     HYPOALBUMINEMIA POA 2020 alb 3              DISPOSITION/ PLAN    89 f PMH dementia anxiety asthma delusional disorder HLD hytn presented 2020 with feverAKI lacticemi  likely sec to UTI and dehydration     INFECTION Rpcephin  DEHYDRATION iv fluids       TIME SPENT Over 55 minutes aggregate care time spent on encounter; activities included   direct pt care, counseling and/or coordinating care reviewing notes, lab data/ imaging , discussion with multidisciplinary team/ pt /family. Risks, benefits, alternatives  discussed in detail.    YOVANI HIGGINS [facility] [id number]  1929 DOA 2020 DR DEMETRIA ELIAS

## 2020-01-30 NOTE — SWALLOW BEDSIDE ASSESSMENT ADULT - SWALLOW EVAL: DIAGNOSIS
Pt p/w 1. Mild to moderate oral dysphagia when given thin liquids and nectar thick liquids marked by reduced utensil stripping, adequate containment, prolonged manipulation, and suspected posterior loss. 2. Moderate to severe pharyngeal dysphagia when given thin liquids marked by suspected delayed swallow onset, reduced laryngeal elevation to palpation, and immediate cough response post swallow. 3. Mild pharyngeal dysphagia when given nectar thick liquids marked by suspected delayed swallow onset, reduced laryngeal elevation to palpation, and no overt s/sx of aspiration. 3. Unable to assess solid textures 2/2 pt refusal despite max cues of encouragement. 4. Recommend nectar thick liquids, defer solid texture to MD. Feed only when fully awake/alert.

## 2020-01-30 NOTE — ED PROVIDER NOTE - CLINICAL SUMMARY MEDICAL DECISION MAKING FREE TEXT BOX
pt bib ems from assisted living for fever - ekg/xr/labs/ivf/abx pt bib ems from assisted living for fever - ekg/xr/labs/ivf/abx/tylenol

## 2020-01-30 NOTE — CONSULT NOTE ADULT - SUBJECTIVE AND OBJECTIVE BOX
PINA GISELA [facility] [id number]  1929 DOA 2020 DR DEMETRIA KIM  ALLERGY asa mangos shellfish        CONTACT  Datr Sylserena Cole  selv                      Initial evaluation/Pulmonary Critical Care consultation requested on 2020   by Dr Kim   from Dr Benavides   Patient examined chart reviewed    HOSPITAL ADMISSION   PATIENT CAME  FROM (if information available)      PINAPRIYANK HIGGINS [facility] [id number]  1929 DOA 2020 DR DEMETRIA KIM  ALLERGY asa mangos shellfish        CONTACT  Datr Syl Cole  selv               REVIEW OF SYMPTOMS      Able to give ROS  NO     PHYSICAL EXAM    HEENT Unremarkable PERRLA atraumatic   RESP Fair air entry EXP prolonged    Harsh breath sound Resp distres mild   CARDIAC S1 S2 No S3     NO JVD    ABDOMEN SOFT BS PRESENT NOT DISTENDED No hepatosplenomegaly PEDAL EDEMA present No calf tenderness  NO rash   GENERAL Not TOXIC looking    VITALS/LABS         2020 103 60 148/69 16 96%  2020 W 14.2 Hb 12.3 Plt 195  Na 139 K 4 CO2 29 Cr 2.5     PT DATA/BEST PRACTICE  # ALLERGY shellfish mangos aspiri  ADVANCED DIRECTIVE       Goals of care discussion  WT  62 (2020)   BMI   25  (2020)                                                                                                       HEAD OF BED ELEVATION Yes  DYSPHAGIA EVAL   # DIET     Dys 2 mech soft thin (2020)     # IV F    NS 50 (2020)    BOLUS   2020 12p                                  # DVT PROPHYLAXIS        hpsc (2020)   # ABIO   Rocephin  ()   azithro ()                  STRESS ULCER PROPHYLAXIS              famotidine 20 (2020)   INFECTION PPLX  ECHO                  GLYCEMIC CONTROL  PROCEDURE    # MICROBIO   2020 Flu AB n RSV n                                                                        PT SUMMARY                                89 f PMH dementia anxiety asthma delusional disorder HLD hytn presented 2020 with fever Pulm Critical care consulted and pt admitted on 2020      home meds Lorazepam .5x3 metoprolol 25 losartaan 100 senna colace ativan 0.5 fa 1 depakote 250.2 lovoxyl 100 alprazolam 0.25x2 quetiapine 25.2 metoprolol 50.2 lasix 20

## 2020-01-30 NOTE — ED PROVIDER NOTE - CARE PLAN
Principal Discharge DX:	Fever Principal Discharge DX:	Fever  Secondary Diagnosis:	UTI (urinary tract infection)  Secondary Diagnosis:	Sepsis

## 2020-01-30 NOTE — ED ADULT NURSE NOTE - OBJECTIVE STATEMENT
This is a 89 y/o female coming from [facility], was sent to be evaluated for fever. On arrival to ED, Patient fever was 103.0 rectal. No fever, nausea, noted. Kianje This is a 89 y/o female coming from [facility], was sent to be evaluated for fever. On arrival to ED, Patient fever was 103.0 rectal. Patient respiration even unlabored lung field clear bilaterally. Placed on cardiac monitor EKG done lab drawn and sent will monitor support and safety maintained.

## 2020-01-30 NOTE — CONSULT NOTE ADULT - SUBJECTIVE AND OBJECTIVE BOX
Patient is a 89y Female whom presented to the hospital with ckd and josue     PAST MEDICAL & SURGICAL HISTORY:  Constipation  Asthma  Hyperlipidemia  Hypertension  Anxiety disorder  Delusional disorder  Dementia  No significant past surgical history      MEDICATIONS  (STANDING):  albuterol/ipratropium for Nebulization 3 milliLiter(s) Nebulizer every 6 hours  buDESOnide    Inhalation Suspension 0.5 milliGRAM(s) Inhalation two times a day  cefTRIAXone   IVPB 1000 milliGRAM(s) IV Intermittent every 24 hours  diVALproex Sprinkle 125 milliGRAM(s) Oral two times a day  famotidine    Tablet 20 milliGRAM(s) Oral daily  folic acid 1 milliGRAM(s) Oral daily  heparin  Injectable 5000 Unit(s) SubCutaneous every 12 hours  lactated ringers. 1000 milliLiter(s) (80 mL/Hr) IV Continuous <Continuous>  lactobacillus acidophilus 1 Tablet(s) Oral every 8 hours  levothyroxine 100 MICROGram(s) Oral daily  metoprolol succinate ER 50 milliGRAM(s) Oral two times a day  polyethylene glycol 3350 17 Gram(s) Oral two times a day  QUEtiapine 25 milliGRAM(s) Oral two times a day  senna 2 Tablet(s) Oral at bedtime      Allergies    aspirin (Anaphylaxis)  aspirin (Unknown)  mangoes (Unknown)  mangos (Unknown)  shellfish (Unknown)    Intolerances        SOCIAL HISTORY:  Denies ETOh,Smoking,     FAMILY HISTORY:  No pertinent family history in first degree relatives      REVIEW OF SYSTEMS:    unable to obtained a good review system      VITAL:  T(C): , Max: 39.4 (20 @ 10:03)  T(F): , Max: 103 (20 @ 10:03)  HR: 68 (20 @ 20:18)  BP: 168/74 (20 @ 16:25)  BP(mean): --  RR: 18 (20 @ 16:40)  SpO2: 96% (20 @ 20:18)  Wt(kg): --    I and O's:    Height (cm): 157.48 ( 10:18)  Weight (kg): 62.832200456 ( 10:18)  BMI (kg/m2): 25 (01-30 @ 10:18)  BSA (m2): 1.63 (01-30 @ 10:18)    PHYSICAL EXAM:    Constitutional: NAD  HEENT: conjunctive   clear   Neck:  No JVD  Respiratory: decreases bs b/l   Cardiovascular: S1 and S2  Gastrointestinal: BS+, soft, NT/ND  Extremities: No peripheral edema  Neurological:  no focal deficits  Psychiatric: Normal mood, normal affect  Skin: dry   Access: Not applicable    LABS:                        12.3   14.25 )-----------( 195      ( 2020 11:10 )             37.0         139  |  100  |  43<H>  ----------------------------<  185<H>  4.0   |  29  |  2.50<H>    Ca    9.4      2020 11:10    TPro  8.4<H>  /  Alb  3.0<L>  /  TBili  1.1  /  DBili  x   /  AST  38<H>  /  ALT  19  /  AlkPhos  105  -30      Urine Studies:  Urinalysis Basic - ( 2020 11:15 )    Color: Yellow / Appearance: Turbid / S.015 / pH: x  Gluc: x / Ketone: Trace  / Bili: Negative / Urobili: Negative   Blood: x / Protein: 500 mg/dL / Nitrite: Negative   Leuk Esterase: Moderate / RBC: 25-50 /HPF / WBC 26-50   Sq Epi: x / Non Sq Epi: Few / Bacteria: TNTC            RADIOLOGY & ADDITIONAL STUDIES:

## 2020-01-30 NOTE — CONSULT NOTE ADULT - SUBJECTIVE AND OBJECTIVE BOX
Patient is a 89y old  Female who presents with a chief complaint of FEVER (30 Jan 2020 13:40)      HPI:  89 yo  , [facility] resident with medical hx of  Anxiety disorder  ,Asthma  ,Constipation  ,Delusional disorder  ,Dementia  ,Hyperlipidemia    Hypertension. bib ems for fever. No further hx available due to dementia  .Urosepsis vs aspiration pneumonia suspected Admitted for septic workup and evaluation ,send blood and urine cx,serial lactate levels ,monitor vitals closely hydration ,monitor urine output and renal profile ,iv abx initiated. Pulmonology evaluation obtained Patient found to have lactate elevation and septic workup was sent Palliative care consult requested ,to discuss advance directives and complete MOLST (30 Jan 2020 12:43)      Asked to see patient for ID Consult    PAST MEDICAL & SURGICAL HISTORY:  Constipation  Asthma  Hyperlipidemia  Hypertension  Anxiety disorder  Delusional disorder  Dementia  No significant past surgical history      Allergies    aspirin (Anaphylaxis)  aspirin (Unknown)  mangoes (Unknown)  mangos (Unknown)  shellfish (Unknown)    Intolerances        REVIEW OF SYSTEMS:  All systems below were reviewed and are negative [  ]  HEENT:  ID:  Pulmonary:  Cardiac:  GI:  Renal:  Musculoskeletal:  All other systems above were reviewed and are negative   [  ]    HOME MEDICATIONS:    MEDICATIONS  (STANDING):  albuterol/ipratropium for Nebulization 3 milliLiter(s) Nebulizer every 6 hours  buDESOnide    Inhalation Suspension 0.5 milliGRAM(s) Inhalation two times a day  cefTRIAXone   IVPB 1000 milliGRAM(s) IV Intermittent every 24 hours  diVALproex Sprinkle 125 milliGRAM(s) Oral two times a day  famotidine    Tablet 20 milliGRAM(s) Oral daily  folic acid 1 milliGRAM(s) Oral daily  heparin  Injectable 5000 Unit(s) SubCutaneous every 12 hours  lactated ringers. 1000 milliLiter(s) (80 mL/Hr) IV Continuous <Continuous>  lactobacillus acidophilus 1 Tablet(s) Oral every 8 hours  levothyroxine 100 MICROGram(s) Oral daily  metoprolol succinate ER 50 milliGRAM(s) Oral two times a day  polyethylene glycol 3350 17 Gram(s) Oral two times a day  QUEtiapine 25 milliGRAM(s) Oral two times a day  senna 2 Tablet(s) Oral at bedtime    MEDICATIONS  (PRN):  acetaminophen   Tablet .. 650 milliGRAM(s) Oral every 4 hours PRN Temp greater or equal to 38C (100.4F), Mild Pain (1 - 3)  ALPRAZolam 0.25 milliGRAM(s) Oral two times a day PRN agitation  haloperidol    Injectable 0.5 milliGRAM(s) IntraMuscular every 6 hours PRN Agitation ,delusions      Vital Signs Last 24 Hrs  T(C): 38.6 (30 Jan 2020 16:25), Max: 39.4 (30 Jan 2020 10:03)  T(F): 101.5 (30 Jan 2020 16:25), Max: 103 (30 Jan 2020 10:03)  HR: 82 (30 Jan 2020 16:25) (53 - 82)  BP: 168/74 (30 Jan 2020 16:25) (135/68 - 168/74)  BP(mean): --  RR: 18 (30 Jan 2020 16:40) (15 - 20)  SpO2: 96% (30 Jan 2020 16:40) (87% - 98%)    PHYSICAL EXAM:  HEENT:  Neck:  Lungs:  Heart:  Abdomen:  Genital/ Rectal:  Extremities:  Neurologic:  Vascular:    I&O's Summary      LABORATORY:                          12.3   14.25 )-----------( 195      ( 30 Jan 2020 11:10 )             37.0           01-30    139  |  100  |  43<H>  ----------------------------<  185<H>  4.0   |  29  |  2.50<H>    Ca    9.4      30 Jan 2020 11:10    TPro  8.4<H>  /  Alb  3.0<L>  /  TBili  1.1  /  DBili  x   /  AST  38<H>  /  ALT  19  /  AlkPhos  105  01-30          LABORATORY:    CBC Full  -  ( 30 Jan 2020 11:10 )  WBC Count : 14.25 K/uL  RBC Count : 3.73 M/uL  Hemoglobin : 12.3 g/dL  Hematocrit : 37.0 %  Platelet Count - Automated : 195 K/uL  Mean Cell Volume : 99.2 fl  Mean Cell Hemoglobin : 33.0 pg  Mean Cell Hemoglobin Concentration : 33.2 gm/dL  Auto Neutrophil # : 12.59 K/uL  Auto Lymphocyte # : 0.58 K/uL  Auto Monocyte # : 0.89 K/uL  Auto Eosinophil # : 0.01 K/uL  Auto Basophil # : 0.02 K/uL  Auto Neutrophil % : 88.4 %  Auto Lymphocyte % : 4.1 %  Auto Monocyte % : 6.2 %  Auto Eosinophil % : 0.1 %  Auto Basophil % : 0.1 %          01-30    139  |  100  |  43<H>  ----------------------------<  185<H>  4.0   |  29  |  2.50<H>    Ca    9.4      30 Jan 2020 11:10    TPro  8.4<H>  /  Alb  3.0<L>  /  TBili  1.1  /  DBili  x   /  AST  38<H>  /  ALT  19  /  AlkPhos  105  01-30 Patient is a 89y old  Female who presents with a chief complaint of FEVER (30 Jan 2020 13:40)        The patient is a 89 year old female with a history of anxiety disorder, Asthma  ,Constipation  ,Delusional disorder  ,Dementia  ,Hyperlipidemia and Hypertension who was brought to the ED for weakness and confusion. In the ED she was noted       PAST MEDICAL & SURGICAL HISTORY:  Constipation  Asthma  Hyperlipidemia  Hypertension  Anxiety disorder  Delusional disorder  Dementia  No significant past surgical history      Allergies    aspirin (Anaphylaxis)  aspirin (Unknown)  mangoes (Unknown)  mangos (Unknown)  shellfish (Unknown)    Intolerances        REVIEW OF SYSTEMS:  All systems below were reviewed and are negative [  ]  HEENT:  ID:  Pulmonary:  Cardiac:  GI:  Renal:  Musculoskeletal:  All other systems above were reviewed and are negative   [  ]    HOME MEDICATIONS:    MEDICATIONS  (STANDING):  albuterol/ipratropium for Nebulization 3 milliLiter(s) Nebulizer every 6 hours  buDESOnide    Inhalation Suspension 0.5 milliGRAM(s) Inhalation two times a day  cefTRIAXone   IVPB 1000 milliGRAM(s) IV Intermittent every 24 hours  diVALproex Sprinkle 125 milliGRAM(s) Oral two times a day  famotidine    Tablet 20 milliGRAM(s) Oral daily  folic acid 1 milliGRAM(s) Oral daily  heparin  Injectable 5000 Unit(s) SubCutaneous every 12 hours  lactated ringers. 1000 milliLiter(s) (80 mL/Hr) IV Continuous <Continuous>  lactobacillus acidophilus 1 Tablet(s) Oral every 8 hours  levothyroxine 100 MICROGram(s) Oral daily  metoprolol succinate ER 50 milliGRAM(s) Oral two times a day  polyethylene glycol 3350 17 Gram(s) Oral two times a day  QUEtiapine 25 milliGRAM(s) Oral two times a day  senna 2 Tablet(s) Oral at bedtime    MEDICATIONS  (PRN):  acetaminophen   Tablet .. 650 milliGRAM(s) Oral every 4 hours PRN Temp greater or equal to 38C (100.4F), Mild Pain (1 - 3)  ALPRAZolam 0.25 milliGRAM(s) Oral two times a day PRN agitation  haloperidol    Injectable 0.5 milliGRAM(s) IntraMuscular every 6 hours PRN Agitation ,delusions      Vital Signs Last 24 Hrs  T(C): 38.6 (30 Jan 2020 16:25), Max: 39.4 (30 Jan 2020 10:03)  T(F): 101.5 (30 Jan 2020 16:25), Max: 103 (30 Jan 2020 10:03)  HR: 82 (30 Jan 2020 16:25) (53 - 82)  BP: 168/74 (30 Jan 2020 16:25) (135/68 - 168/74)  BP(mean): --  RR: 18 (30 Jan 2020 16:40) (15 - 20)  SpO2: 96% (30 Jan 2020 16:40) (87% - 98%)    PHYSICAL EXAM:  HEENT:  Neck:  Lungs:  Heart:  Abdomen:  Genital/ Rectal:  Extremities:  Neurologic:  Vascular:    I&O's Summary      LABORATORY:                          12.3   14.25 )-----------( 195      ( 30 Jan 2020 11:10 )             37.0           01-30    139  |  100  |  43<H>  ----------------------------<  185<H>  4.0   |  29  |  2.50<H>    Ca    9.4      30 Jan 2020 11:10    TPro  8.4<H>  /  Alb  3.0<L>  /  TBili  1.1  /  DBili  x   /  AST  38<H>  /  ALT  19  /  AlkPhos  105  01-30          LABORATORY:    CBC Full  -  ( 30 Jan 2020 11:10 )  WBC Count : 14.25 K/uL  RBC Count : 3.73 M/uL  Hemoglobin : 12.3 g/dL  Hematocrit : 37.0 %  Platelet Count - Automated : 195 K/uL  Mean Cell Volume : 99.2 fl  Mean Cell Hemoglobin : 33.0 pg  Mean Cell Hemoglobin Concentration : 33.2 gm/dL  Auto Neutrophil # : 12.59 K/uL  Auto Lymphocyte # : 0.58 K/uL  Auto Monocyte # : 0.89 K/uL  Auto Eosinophil # : 0.01 K/uL  Auto Basophil # : 0.02 K/uL  Auto Neutrophil % : 88.4 %  Auto Lymphocyte % : 4.1 %  Auto Monocyte % : 6.2 %  Auto Eosinophil % : 0.1 %  Auto Basophil % : 0.1 %          01-30    139  |  100  |  43<H>  ----------------------------<  185<H>  4.0   |  29  |  2.50<H>    Ca    9.4      30 Jan 2020 11:10    TPro  8.4<H>  /  Alb  3.0<L>  /  TBili  1.1  /  DBili  x   /  AST  38<H>  /  ALT  19  /  AlkPhos  105  01-30 Patient is a 89y old  Female who presents with a chief complaint of FEVER (30 Jan 2020 13:40)        The patient is a 89 year old female with a history of anxiety disorder, asthma, Constipation, delusional disorder  ,Dementia  ,Hyperlipidemia and Hypertension who was brought to the ED for fever, weakness and confusion. In the ED she was noted to have a fever of 103F and elevated WBC of 14K. Her UA was positive and she was admitted and placed on IV Rocephin. Her blood cultures are growing gram negative rods.       PAST MEDICAL & SURGICAL HISTORY:  Constipation  Asthma  Hyperlipidemia  Hypertension  Anxiety disorder  Delusional disorder  Dementia  No significant past surgical history      Allergies    aspirin (Anaphylaxis)  aspirin (Unknown)  mangoes (Unknown)  mangos (Unknown)  shellfish (Unknown)    Intolerances        REVIEW OF SYSTEMS:  No cough or SOB  No vomiting or diarrhea.    HOME MEDICATIONS:    MEDICATIONS  (STANDING):  albuterol/ipratropium for Nebulization 3 milliLiter(s) Nebulizer every 6 hours  buDESOnide    Inhalation Suspension 0.5 milliGRAM(s) Inhalation two times a day  cefTRIAXone   IVPB 1000 milliGRAM(s) IV Intermittent every 24 hours  diVALproex Sprinkle 125 milliGRAM(s) Oral two times a day  famotidine    Tablet 20 milliGRAM(s) Oral daily  folic acid 1 milliGRAM(s) Oral daily  heparin  Injectable 5000 Unit(s) SubCutaneous every 12 hours  lactated ringers. 1000 milliLiter(s) (80 mL/Hr) IV Continuous <Continuous>  lactobacillus acidophilus 1 Tablet(s) Oral every 8 hours  levothyroxine 100 MICROGram(s) Oral daily  metoprolol succinate ER 50 milliGRAM(s) Oral two times a day  polyethylene glycol 3350 17 Gram(s) Oral two times a day  QUEtiapine 25 milliGRAM(s) Oral two times a day  senna 2 Tablet(s) Oral at bedtime    MEDICATIONS  (PRN):  acetaminophen   Tablet .. 650 milliGRAM(s) Oral every 4 hours PRN Temp greater or equal to 38C (100.4F), Mild Pain (1 - 3)  ALPRAZolam 0.25 milliGRAM(s) Oral two times a day PRN agitation  haloperidol    Injectable 0.5 milliGRAM(s) IntraMuscular every 6 hours PRN Agitation ,delusions      Vital Signs Last 24 Hrs  T(C): 38.6 (30 Jan 2020 16:25), Max: 39.4 (30 Jan 2020 10:03)  T(F): 101.5 (30 Jan 2020 16:25), Max: 103 (30 Jan 2020 10:03)  HR: 82 (30 Jan 2020 16:25) (53 - 82)  BP: 168/74 (30 Jan 2020 16:25) (135/68 - 168/74)  BP(mean): --  RR: 18 (30 Jan 2020 16:40) (15 - 20)  SpO2: 96% (30 Jan 2020 16:40) (87% - 98%)    PHYSICAL EXAM:  HEENT: NC/AT  Neck: Soft  Lungs: Coarse BS bilaterally; no wheezing.   Heart: RRR; no murmurs.  Abdomen: Soft; no tenderness.   Extremities: No ulcers.  Neurologic: Confused.   Vascular:    I&O's Summary      LABORATORY:                          12.3   14.25 )-----------( 195      ( 30 Jan 2020 11:10 )             37.0       139  |  100  |  43<H>  ----------------------------<  185<H>  4.0   |  29  |  2.50<H>    Ca    9.4      30 Jan 2020 11:10    TPro  8.4<H>  /  Alb  3.0<L>  /  TBili  1.1  /  DBili  x   /  AST  38<H>  /  ALT  19  /  AlkPhos  105  01-30      LABORATORY:    CBC Full  -  ( 30 Jan 2020 11:10 )  WBC Count : 14.25 K/uL  RBC Count : 3.73 M/uL  Hemoglobin : 12.3 g/dL  Hematocrit : 37.0 %  Platelet Count - Automated : 195 K/uL  Mean Cell Volume : 99.2 fl  Mean Cell Hemoglobin : 33.0 pg  Mean Cell Hemoglobin Concentration : 33.2 gm/dL  Auto Neutrophil # : 12.59 K/uL  Auto Lymphocyte # : 0.58 K/uL  Auto Monocyte # : 0.89 K/uL  Auto Eosinophil # : 0.01 K/uL  Auto Basophil # : 0.02 K/uL  Auto Neutrophil % : 88.4 %  Auto Lymphocyte % : 4.1 %  Auto Monocyte % : 6.2 %  Auto Eosinophil % : 0.1 %  Auto Basophil % : 0.1 %      139  |  100  |  43<H>  ----------------------------<  185<H>  4.0   |  29  |  2.50<H>    Ca    9.4      30 Jan 2020 11:10    TPro  8.4<H>  /  Alb  3.0<L>  /  TBili  1.1  /  DBili  x   /  AST  38<H>  /  ALT  19  /  AlkPhos  105  01-30      Assessment and Plan:    1. Sepsis due to UTI.  2. Bacteremia with gram negative rods.    . Continue IV Rocephin.  . Follow blood and urine cultures.  . Repeat blood cultures in 48 hrs.  . Monitor fever progression.    Thank you for the courtesy of this consultation. History of apnea of prematurity

## 2020-01-30 NOTE — H&P ADULT - HISTORY OF PRESENT ILLNESS
89 yo WF , [facility] resident with medical hx of  Anxiety disorder  ,Asthma  ,Constipation  ,Delusional disorder  ,Dementia  ,Hyperlipidemia    Hypertension. bib ems for fever. No further hx available due to dementia  .Urosepsis vs aspiration pneumonia suspected Admitted for septic workup and evaluation ,send blood and urine cx,serial lactate levels ,monitor vitals closely hydration ,monitor urine output and renal profile ,iv abx initiated. Pulmonology evaluation obtained Patient found to have lactate elevation and septic workup was sent Palliative care consult requested ,to discuss advance directives and complete MOLST

## 2020-01-30 NOTE — H&P ADULT - NSHPLABSRESULTS_GEN_ALL_CORE
< from: Xray Chest 1 View-PORTABLE IMMEDIATE (01.16.20 @ 11:40) >    EXAM:  XR CHEST PORTABLE IMMED 1V                            PROCEDURE DATE:  01/16/2020          INTERPRETATION:  AP semierect chest on January 16, 2020 11:29 AM. Patient has chest pain.    Shallow inspiration crowds the chest. The heart is magnified by technique. The aorta is uncoiled.    Slightly increased markings at left base are likely secondary to shallow inspiration.    Patient's chin obscures the apices.    Chest is similar to August 10, 2018.    IMPRESSION: No definite acute finding.      < end of copied text >    < from: CT Head No Cont (08.10.18 @ 19:06) >    EXAM:  CT BRAIN                            PROCEDURE DATE:  08/10/2018          INTERPRETATION:  CT HEAD WITHOUT CONTRAST    INDICATION: 88 years old. Female. sp mult recent falls.     COMPARISON: 8/10/2018.    TECHNIQUE: Noncontrast axial CT head was obtained from the skull base to   vertex.    FINDINGS:  No acute intracranial hemorrhage, mass effect or midline shift.  No CT evidence of acute large territory vascular infarct.  The ventricles and cortical sulci are prominent reflecting parenchymal   volume loss.  Patchy hypodensities in the periventricular white matter are nonspecific,   but likely sequela of small vessel ischemic disease.  Intracranial atherosclerotic calcifications are present.    Mucus retention cyst/polyp in the left maxillary sinus is reidentified.   The mastoid air cells are well aerated. The native ocular lenses are   surgically absent.  Mild soft tissue swelling left frontal temporal scalp, overlying the left   superior orbital margin, and left occipital and suboccipital scalp. No   displaced calvarial fracture.    IMPRESSION:  No acute intracranial hemorrhage or mass effect.    < end of copied text > < from: Xray Chest 1 View-PORTABLE IMMEDIATE (01.16.20 @ 11:40) >    EXAM:  XR CHEST PORTABLE IMMED 1V                            PROCEDURE DATE:  01/16/2020          INTERPRETATION:  AP semierect chest on January 16, 2020 11:29 AM. Patient has chest pain.    Shallow inspiration crowds the chest. The heart is magnified by technique. The aorta is uncoiled.    Slightly increased markings at left base are likely secondary to shallow inspiration.    Patient's chin obscures the apices.    Chest is similar to August 10, 2018.    IMPRESSION: No definite acute finding.      < end of copied text >    < from: CT Head No Cont (08.10.18 @ 19:06) >    EXAM:  CT BRAIN                            PROCEDURE DATE:  08/10/2018          INTERPRETATION:  CT HEAD WITHOUT CONTRAST    INDICATION: 88 years old. Female. sp mult recent falls.     COMPARISON: 8/10/2018.    TECHNIQUE: Noncontrast axial CT head was obtained from the skull base to   vertex.    FINDINGS:  No acute intracranial hemorrhage, mass effect or midline shift.  No CT evidence of acute large territory vascular infarct.  The ventricles and cortical sulci are prominent reflecting parenchymal   volume loss.  Patchy hypodensities in the periventricular white matter are nonspecific,   but likely sequela of small vessel ischemic disease.  Intracranial atherosclerotic calcifications are present.    Mucus retention cyst/polyp in the left maxillary sinus is reidentified.   The mastoid air cells are well aerated. The native ocular lenses are   surgically absent.  Mild soft tissue swelling left frontal temporal scalp, overlying the left   superior orbital margin, and left occipital and suboccipital scalp. No   displaced calvarial fracture.    IMPRESSION:  No acute intracranial hemorrhage or mass effect.    < end of copied text > < from: TTE Echo Doppler w/o Cont (06.26.17 @ 10:51) >    LVEF: 60%  RVSP: 27mm/Hg  RA Pressure: 10mm/Hg  IVC:    cm    FINDINGS  Left Ventricle: Normal left ventricular size, wall thickness, and global   systolic function  Right Ventricle: Normal  Left Atrium: Normal  Right Atrium: Normal  Mitral Valve: Mild thickening of the mitral valve leaflets with mild to   moderate mitral regurgitation  Aortic Valve: Aortic sclerosis with mild to moderate aortic regurgitation  Tricuspid Valve: Mild tricuspid regurgitation  Pulmonic Valve: Mild pulmonic regurgitation  Diastolic Function: Normal  Pericardium/Pleura: No significant effusions    < end of copied text >

## 2020-01-30 NOTE — CONSULT NOTE ADULT - ASSESSMENT
89 yo WF , [facility] resident with medical hx of  Anxiety disorder  ,Asthma  ,Constipation  ,Delusional disorder  ,Dementia  ,Hyperlipidemia    Hypertension. bib ems for fever. No further hx available due to dementia  .Urosepsis vs aspiration pneumonia suspected Admitted for septic workup and evaluation ,send blood and urine cx,serial lactate levels ,monitor vitals closely hydration ,monitor urine output and renal profile ,iv abx initiated. Pulmonology evaluation obtained Patient found to have lactate elevation and septic workup was sent Palliative care consult requested ,  now with josue

## 2020-01-30 NOTE — SWALLOW BEDSIDE ASSESSMENT ADULT - COMMENTS
Upon arrival, pt sleeping in bed. Pt lethargic, but arousable to persistent verbal/tactile prompts. Pt p/w intermittent tachycardia, RN present in room and reported team is aware. Pt unable to follow simple commands despite max multimodal cues. Pt w/o vocalizations during session.    CXR 1/30: "Mild right atelectasis." Pt's WBC is elevated, admitted with fever.

## 2020-01-30 NOTE — CONSULT NOTE ADULT - ATTENDING COMMENTS
< from: Xray Chest 1 View AP/PA (01.30.20 @ 12:16) >    XAM:  XR CHEST AP OR PA 1V                            PROCEDURE DATE:  01/30/2020          INTERPRETATION:  Chest one view    HISTORY: Fever    COMPARISON STUDY: 1/16/2020    Frontal expiratory view of the chest shows the heart to be similar in size. The lungs show mild right base atelectasis and there is no evidence of pneumothorax nor pleural effusion.    IMPRESSION:  Mild right atelectasis.    Thank you for the courtesy of this referral.                MATT LLANOS M.D., ATTENDING RADIOLOGIST  This document has been electronically signed. Jan 30 2020 12:51PM          < end of copied text >

## 2020-01-30 NOTE — H&P ADULT - NSICDXPASTMEDICALHX_GEN_ALL_CORE_FT
PAST MEDICAL HISTORY:  Anxiety disorder     Asthma     Constipation     Delusional disorder     Dementia     Hyperlipidemia     Hypertension

## 2020-01-30 NOTE — SWALLOW BEDSIDE ASSESSMENT ADULT - ORAL PHASE
suspected posterior loss suspected posterior loss/Decreased anterior-posterior movement of the bolus

## 2020-01-30 NOTE — SWALLOW BEDSIDE ASSESSMENT ADULT - SWALLOW EVAL: RECOMMENDED FEEDING/EATING TECHNIQUES
alternate food with liquid/hard swallow w/ each bite or sip/position upright (90 degrees)/allow for swallow between intakes/check mouth frequently for oral residue/pocketing/crush medication (when feasible)/maintain upright posture during/after eating for 30 mins/no straws/oral hygiene/small sips/bites

## 2020-01-31 DIAGNOSIS — Z51.5 ENCOUNTER FOR PALLIATIVE CARE: ICD-10-CM

## 2020-01-31 LAB
ANION GAP SERPL CALC-SCNC: 7 MMOL/L — SIGNIFICANT CHANGE UP (ref 5–17)
BUN SERPL-MCNC: 43 MG/DL — HIGH (ref 7–23)
CALCIUM SERPL-MCNC: 8.6 MG/DL — SIGNIFICANT CHANGE UP (ref 8.5–10.1)
CHLORIDE SERPL-SCNC: 106 MMOL/L — SIGNIFICANT CHANGE UP (ref 96–108)
CO2 SERPL-SCNC: 27 MMOL/L — SIGNIFICANT CHANGE UP (ref 22–31)
CREAT SERPL-MCNC: 1.6 MG/DL — HIGH (ref 0.5–1.3)
E COLI DNA BLD POS QL NAA+NON-PROBE: SIGNIFICANT CHANGE UP
GLUCOSE SERPL-MCNC: 181 MG/DL — HIGH (ref 70–99)
GRAM STN FLD: SIGNIFICANT CHANGE UP
METHOD TYPE: SIGNIFICANT CHANGE UP
POTASSIUM SERPL-MCNC: 4 MMOL/L — SIGNIFICANT CHANGE UP (ref 3.5–5.3)
POTASSIUM SERPL-SCNC: 4 MMOL/L — SIGNIFICANT CHANGE UP (ref 3.5–5.3)
RAPID RVP RESULT: SIGNIFICANT CHANGE UP
SODIUM SERPL-SCNC: 140 MMOL/L — SIGNIFICANT CHANGE UP (ref 135–145)
SPECIMEN SOURCE: SIGNIFICANT CHANGE UP
SPECIMEN SOURCE: SIGNIFICANT CHANGE UP

## 2020-01-31 RX ADMIN — QUETIAPINE FUMARATE 25 MILLIGRAM(S): 200 TABLET, FILM COATED ORAL at 17:26

## 2020-01-31 RX ADMIN — Medication 3 MILLILITER(S): at 07:26

## 2020-01-31 RX ADMIN — Medication 3 MILLILITER(S): at 20:54

## 2020-01-31 RX ADMIN — Medication 1 TABLET(S): at 06:08

## 2020-01-31 RX ADMIN — SODIUM CHLORIDE 80 MILLILITER(S): 9 INJECTION, SOLUTION INTRAVENOUS at 22:06

## 2020-01-31 RX ADMIN — QUETIAPINE FUMARATE 25 MILLIGRAM(S): 200 TABLET, FILM COATED ORAL at 06:05

## 2020-01-31 RX ADMIN — Medication 50 MILLIGRAM(S): at 06:04

## 2020-01-31 RX ADMIN — Medication 50 MILLIGRAM(S): at 17:26

## 2020-01-31 RX ADMIN — Medication 1 TABLET(S): at 13:11

## 2020-01-31 RX ADMIN — POLYETHYLENE GLYCOL 3350 17 GRAM(S): 17 POWDER, FOR SOLUTION ORAL at 17:26

## 2020-01-31 RX ADMIN — Medication 0.5 MILLIGRAM(S): at 20:54

## 2020-01-31 RX ADMIN — Medication 650 MILLIGRAM(S): at 17:34

## 2020-01-31 RX ADMIN — Medication 0.5 MILLIGRAM(S): at 07:26

## 2020-01-31 RX ADMIN — SENNA PLUS 2 TABLET(S): 8.6 TABLET ORAL at 21:04

## 2020-01-31 RX ADMIN — CEFTRIAXONE 100 MILLIGRAM(S): 500 INJECTION, POWDER, FOR SOLUTION INTRAMUSCULAR; INTRAVENOUS at 05:57

## 2020-01-31 RX ADMIN — POLYETHYLENE GLYCOL 3350 17 GRAM(S): 17 POWDER, FOR SOLUTION ORAL at 06:04

## 2020-01-31 RX ADMIN — HEPARIN SODIUM 5000 UNIT(S): 5000 INJECTION INTRAVENOUS; SUBCUTANEOUS at 17:27

## 2020-01-31 RX ADMIN — Medication 3 MILLILITER(S): at 13:52

## 2020-01-31 RX ADMIN — FAMOTIDINE 20 MILLIGRAM(S): 10 INJECTION INTRAVENOUS at 13:11

## 2020-01-31 RX ADMIN — Medication 3 MILLILITER(S): at 01:02

## 2020-01-31 RX ADMIN — HEPARIN SODIUM 5000 UNIT(S): 5000 INJECTION INTRAVENOUS; SUBCUTANEOUS at 05:58

## 2020-01-31 RX ADMIN — Medication 1 MILLIGRAM(S): at 13:11

## 2020-01-31 RX ADMIN — Medication 100 MICROGRAM(S): at 05:58

## 2020-01-31 RX ADMIN — Medication 1 TABLET(S): at 21:04

## 2020-01-31 RX ADMIN — DIVALPROEX SODIUM 125 MILLIGRAM(S): 500 TABLET, DELAYED RELEASE ORAL at 17:26

## 2020-01-31 RX ADMIN — DIVALPROEX SODIUM 125 MILLIGRAM(S): 500 TABLET, DELAYED RELEASE ORAL at 05:58

## 2020-01-31 NOTE — PROGRESS NOTE ADULT - SUBJECTIVE AND OBJECTIVE BOX
Patient is a 89y Female whom presented to the hospital with ckd and josue     PAST MEDICAL & SURGICAL HISTORY:  Constipation  Asthma  Hyperlipidemia  Hypertension  Anxiety disorder  Delusional disorder  Dementia  No significant past surgical history      MEDICATIONS  (STANDING):  albuterol/ipratropium for Nebulization 3 milliLiter(s) Nebulizer every 6 hours  buDESOnide    Inhalation Suspension 0.5 milliGRAM(s) Inhalation two times a day  cefTRIAXone   IVPB 1000 milliGRAM(s) IV Intermittent every 24 hours  diVALproex Sprinkle 125 milliGRAM(s) Oral two times a day  famotidine    Tablet 20 milliGRAM(s) Oral daily  folic acid 1 milliGRAM(s) Oral daily  heparin  Injectable 5000 Unit(s) SubCutaneous every 12 hours  lactated ringers. 1000 milliLiter(s) (80 mL/Hr) IV Continuous <Continuous>  lactobacillus acidophilus 1 Tablet(s) Oral every 8 hours  levothyroxine 100 MICROGram(s) Oral daily  metoprolol succinate ER 50 milliGRAM(s) Oral two times a day  polyethylene glycol 3350 17 Gram(s) Oral two times a day  QUEtiapine 25 milliGRAM(s) Oral two times a day  senna 2 Tablet(s) Oral at bedtime      Allergies    aspirin (Anaphylaxis)  aspirin (Unknown)  mangoes (Unknown)  mangos (Unknown)  shellfish (Unknown)    Intolerances        SOCIAL HISTORY:  Denies ETOh,Smoking,     FAMILY HISTORY:  No pertinent family history in first degree relatives      REVIEW OF SYSTEMS:    unable to obtained a good review system                                               12.3   14.25 )-----------( 195      ( 2020 11:10 )             37.0       CBC Full  -  ( 2020 11:10 )  WBC Count : 14.25 K/uL  RBC Count : 3.73 M/uL  Hemoglobin : 12.3 g/dL  Hematocrit : 37.0 %  Platelet Count - Automated : 195 K/uL  Mean Cell Volume : 99.2 fl  Mean Cell Hemoglobin : 33.0 pg  Mean Cell Hemoglobin Concentration : 33.2 gm/dL  Auto Neutrophil # : 12.59 K/uL  Auto Lymphocyte # : 0.58 K/uL  Auto Monocyte # : 0.89 K/uL  Auto Eosinophil # : 0.01 K/uL  Auto Basophil # : 0.02 K/uL  Auto Neutrophil % : 88.4 %  Auto Lymphocyte % : 4.1 %  Auto Monocyte % : 6.2 %  Auto Eosinophil % : 0.1 %  Auto Basophil % : 0.1 %          139  |  100  |  43<H>  ----------------------------<  185<H>  4.0   |  29  |  2.50<H>    Ca    9.4      2020 11:10    TPro  8.4<H>  /  Alb  3.0<L>  /  TBili  1.1  /  DBili  x   /  AST  38<H>  /  ALT  19  /  AlkPhos  105        CAPILLARY BLOOD GLUCOSE          Vital Signs Last 24 Hrs  T(C): 36.8 (2020 09:22), Max: 39.4 (2020 15:02)  T(F): 98.3 (2020 09:22), Max: 103 (2020 15:02)  HR: 69 (2020 13:52) (67 - 82)  BP: 165/81 (2020 09:22) (131/77 - 168/74)  BP(mean): --  RR: 19 (2020 09:22) (18 - 20)  SpO2: 99% (2020 13:52) (87% - 100%)    Urinalysis Basic - ( 2020 11:15 )    Color: Yellow / Appearance: Turbid / S.015 / pH: x  Gluc: x / Ketone: Trace  / Bili: Negative / Urobili: Negative   Blood: x / Protein: 500 mg/dL / Nitrite: Negative   Leuk Esterase: Moderate / RBC: 25-50 /HPF / WBC 26-50   Sq Epi: x / Non Sq Epi: Few / Bacteria: TNTC        PT/INR - ( 2020 11:15 )   PT: 14.2 sec;   INR: 1.26 ratio         PTT - ( 2020 11:15 )  PTT:29.9 sec     PTT - ( 2020 11:15 )  PTT:29.9 sec    PHYSICAL EXAM:    Constitutional: NAD  HEENT: conjunctive   clear   Neck:  No JVD  Respiratory: decreases bs b/l   Cardiovascular: S1 and S2  Gastrointestinal: BS+, soft, NT/ND  Extremities: No peripheral edema  Neurological:  no focal deficits  Psychiatric: Normal mood, normal affect  Skin: dry   Access: Not applicable

## 2020-01-31 NOTE — PROGRESS NOTE ADULT - ASSESSMENT
cont rx cont rx  YOVANI HIGGINS [facility] [place] P [phone]  1929 DOA 2020 DR DEMETRIA ELIAS  ALLERGY asa mangos shellfish        CONTACT  Datr Syl Cole  selv               REVIEW OF SYMPTOMS      Able to give ROS  NO     PHYSICAL EXAM    HEENT Unremarkable PERRLA atraumatic   RESP Fair air entry EXP prolonged    Harsh breath sound Resp distres mild   CARDIAC S1 S2 No S3     NO JVD    ABDOMEN SOFT BS PRESENT NOT DISTENDED No hepatosplenomegaly PEDAL EDEMA present No calf tenderness  NO rash   GENERAL Not TOXIC looking    VITALS/LABS       2020 afeb 74 160/60   2020 103 60 148/69 16 96%  2020 W 14.2 Hb 12.3 Plt 195  Na 139 K 4 CO2 29 Cr 2.5     PT DATA/BEST PRACTICE  # ALLERGY shellfish mangos aspiri  ADVANCED DIRECTIVE       Goals of care discussion  WT  62 (2020)   BMI   25  (2020)                                                                                                       HEAD OF BED ELEVATION Yes  DYSPHAGIA EVAL   # DIET     Dys 2 mech soft thin (2020)     # IV F    LR 80 () <--NS 50 (2020)    BOLUS   2020 12p                                  # DVT PROPHYLAXIS        hpsc (2020)   # ABIO   Rocephin  ()   azithro ()                  STRESS ULCER PROPHYLAXIS              famotidine 20 (2020)   INFECTION PPLX  ECHO                CXR 2020 CXR Mild r atelectasis   GLYCEMIC CONTROL  PROCEDURE    # MICROBIO    blod culture e coli    RVP n   2020 Flu AB n RSV n                                                                        PATIENT DATA ASSESSMENT PLAN                                     RESP GAS EXCHANGE 2020 RA 96%  Target PO 90-95%  HEMODYNAMICS 2020 /69 Target MAP 65     E COLI BACTEREMIA ()  LIKELY SEC TO UTI   FEVER 103 POA 2020   LEUKOCYTOSIS POA 2020 W 14.2    UTI POA 2020 UA W 25-50 R 25-50 Blod larg Nitr n L estr Mod bact tntc  LACTICEMIA POA 2020 l --2020 LA 5.7 -4.2-2.4  a/r Rx with Rocephin iv fluids Monitor LA BP Target MAP 65 Check rvp         ATELECTASIS POA 2020 CXR Mild R base atelectasis    Inc rabia  COPD Duoneb ()      HENRIK POA 2020 Cr 1/ Cr 0.7-2.5   a/r IV fluids Serial monitoring     HYPOALBUMINEMIA POA 2020 alb 3                DISPOSITION/ PLAN    89 f PMH dementia anxiety asthma delusional disorder HLD hytn presented 2020 with feverAKI lacticemi  likely sec to UTI and dehydration     INFECTION E coli bacteremia Rpcephin  DEHYDRATION iv fluids       TIME SPENT Over 25 minutes aggregate care time spent on encounter; activities included   direct pt care, counseling and/or coordinating care reviewing notes, lab data/ imaging , discussion with multidisciplinary team/ pt /family. Risks, benefits, alternatives  discussed in detail.    YOVANI HIGGINS [facility] [place] [phone]  1929 DOA 2020 DR DEMETRIA ELIAS

## 2020-01-31 NOTE — PHYSICAL THERAPY INITIAL EVALUATION ADULT - ADDITIONAL COMMENTS
As per EMR &  Shanon Gupta, pt lives in an [facility]. Pt required assistance with all ADLs prior to hospitalization.

## 2020-01-31 NOTE — PROGRESS NOTE ADULT - SUBJECTIVE AND OBJECTIVE BOX
GISELA PINA    PLV [address]NOR 238 W1    Patient is a 89y old  Female who presents with a chief complaint of FEVER (2020 06:45)       Allergies    aspirin (Anaphylaxis)  aspirin (Unknown)  mangoes (Unknown)  mangos (Unknown)  shellfish (Unknown)    Intolerances        HPI:  89 yo WF , [facility] resident with medical hx of  Anxiety disorder  ,Asthma  ,Constipation  ,Delusional disorder  ,Dementia  ,Hyperlipidemia    Hypertension. bib ems for fever. No further hx available due to dementia  .Urosepsis vs aspiration pneumonia suspected Admitted for septic workup and evaluation ,send blood and urine cx,serial lactate levels ,monitor vitals closely hydration ,monitor urine output and renal profile ,iv abx initiated. Pulmonology evaluation obtained Patient found to have lactate elevation and septic workup was sent Palliative care consult requested ,to discuss advance directives and complete MOLST (2020 12:43)      PAST MEDICAL & SURGICAL HISTORY:  Constipation  Asthma  Hyperlipidemia  Hypertension  Anxiety disorder  Delusional disorder  Dementia  No significant past surgical history      FAMILY HISTORY:  No pertinent family history in first degree relatives        MEDICATIONS   acetaminophen   Tablet .. 650 milliGRAM(s) Oral every 4 hours PRN  albuterol/ipratropium for Nebulization 3 milliLiter(s) Nebulizer every 6 hours  ALPRAZolam 0.25 milliGRAM(s) Oral two times a day PRN  buDESOnide    Inhalation Suspension 0.5 milliGRAM(s) Inhalation two times a day  cefTRIAXone   IVPB 1000 milliGRAM(s) IV Intermittent every 24 hours  diVALproex Sprinkle 125 milliGRAM(s) Oral two times a day  famotidine    Tablet 20 milliGRAM(s) Oral daily  folic acid 1 milliGRAM(s) Oral daily  haloperidol    Injectable 0.5 milliGRAM(s) IntraMuscular every 6 hours PRN  heparin  Injectable 5000 Unit(s) SubCutaneous every 12 hours  lactated ringers. 1000 milliLiter(s) IV Continuous <Continuous>  lactobacillus acidophilus 1 Tablet(s) Oral every 8 hours  levothyroxine 100 MICROGram(s) Oral daily  metoprolol succinate ER 50 milliGRAM(s) Oral two times a day  polyethylene glycol 3350 17 Gram(s) Oral two times a day  QUEtiapine 25 milliGRAM(s) Oral two times a day  senna 2 Tablet(s) Oral at bedtime      Vital Signs Last 24 Hrs  T(C): 37.2 (2020 05:14), Max: 39.4 (2020 10:03)  T(F): 98.9 (2020 05:14), Max: 103 (2020 10:03)  HR: 67 (2020 07:26) (53 - 82)  BP: 150/80 (2020 05:14) (131/77 - 168/74)  BP(mean): --  RR: 18 (2020 05:14) (15 - 20)  SpO2: 99% (2020 07:26) (87% - 100%)      20 @ 07:01  -  20 @ 07:00  --------------------------------------------------------  IN: 2800 mL / OUT: 250 mL / NET: 2550 mL            LABS:                        12.3   14.25 )-----------( 195      ( 2020 11:10 )             37.0         139  |  100  |  43<H>  ----------------------------<  185<H>  4.0   |  29  |  2.50<H>    Ca    9.4      2020 11:10    TPro  8.4<H>  /  Alb  3.0<L>  /  TBili  1.1  /  DBili  x   /  AST  38<H>  /  ALT  19  /  AlkPhos  105      PT/INR - ( 2020 11:15 )   PT: 14.2 sec;   INR: 1.26 ratio         PTT - ( 2020 11:15 )  PTT:29.9 sec  Urinalysis Basic - ( 2020 11:15 )    Color: Yellow / Appearance: Turbid / S.015 / pH: x  Gluc: x / Ketone: Trace  / Bili: Negative / Urobili: Negative   Blood: x / Protein: 500 mg/dL / Nitrite: Negative   Leuk Esterase: Moderate / RBC: 25-50 /HPF / WBC 26-50   Sq Epi: x / Non Sq Epi: Few / Bacteria: TNTC            WBC:  WBC Count: 14.25 K/uL ( @ 11:10)      MICROBIOLOGY:  RECENT CULTURES:   .Blood Blood-Peripheral Blood Culture PCR   Growth in aerobic bottle:  Gram Negative Rods  Growth in anaerobic bottle:  Gram Negative Rods   Growth in aerobic bottle:  Gram Negative Rods  Growth in anaerobic bottle:  Gram Negative Rods  ***Blood Panel PCR results on this specimen are available  approximately 3 hours after the Gram stain result.***  Gram stain, PCR, and/or culture results may not always  correspond due to difference in methodologies.  ************************************************************  This PCR assay was performed using [employer].  The following targets are tested for: Enterococcus,  vancomycin resistant enterococci, Listeria monocytogenes,  coagulase negative staphylococci, S. aureus,  methicillin resistant S. aureus, Streptococcus agalactiae  (Group B), S. pneumoniae, S. pyogenes (Group A),  Acinetobacter baumannii, Enterobacter cloacae, E. coli,  Klebsiella oxytoca, K. pneumoniae, Proteus sp.,  Serratia marcescens, Haemophilus influenzae,  Neisseria meningitidis, Pseudomonas aeruginosa, Candida  albicans, C. glabrata, C krusei, C parapsilosis,  C. tropicalis and the KPC resistance gene.     .Blood Blood-Peripheral XXXX   Growth in aerobic bottle:  Gram Negative Rods  Growth in anaerobic bottle:  Gram Negative Rods   Growth in aerobic bottle:  Gram Negative Rods  Growth in anaerobic bottle:  Gram Negative Rods                PT/INR - ( 2020 11:15 )   PT: 14.2 sec;   INR: 1.26 ratio         PTT - ( 2020 11:15 )  PTT:29.9 sec    Sodium:  Sodium, Serum: 139 mmol/L ( @ 11:10)      2.50 mg/dL  @ 11:10      Hemoglobin:  Hemoglobin: 12.3 g/dL ( @ 11:10)      Platelets: Platelet Count - Automated: 195 K/uL ( @ 11:10)      LIVER FUNCTIONS - ( 2020 11:10 )  Alb: 3.0 g/dL / Pro: 8.4 g/dL / ALK PHOS: 105 U/L / ALT: 19 U/L / AST: 38 U/L / GGT: x             Urinalysis Basic - ( 2020 11:15 )    Color: Yellow / Appearance: Turbid / S.015 / pH: x  Gluc: x / Ketone: Trace  / Bili: Negative / Urobili: Negative   Blood: x / Protein: 500 mg/dL / Nitrite: Negative   Leuk Esterase: Moderate / RBC: 25-50 /HPF / WBC 26-50   Sq Epi: x / Non Sq Epi: Few / Bacteria: TNTC        RADIOLOGY & ADDITIONAL STUDIES:

## 2020-01-31 NOTE — PHYSICAL THERAPY INITIAL EVALUATION ADULT - PERTINENT HX OF CURRENT PROBLEM, REHAB EVAL
Pt is a 89 y/o female with a h/o Anxiety disorder  Asthma  Dementia  Hyperlipidemia  Hypertension. Pulmonology evaluation obtained Patient found to have lactate elevation and septic workup was sent Palliative care consult requested , now with josue.

## 2020-01-31 NOTE — PHYSICAL THERAPY INITIAL EVALUATION ADULT - IMPAIRED TRANSFERS: BED/CHAIR, REHAB EVAL
impaired balance/impaired coordination/impaired motor control/impaired postural control/cognition/decreased strength

## 2020-01-31 NOTE — PROGRESS NOTE ADULT - SUBJECTIVE AND OBJECTIVE BOX
Date/Time Patient Seen:  		  Referring MD:   Data Reviewed	       Patient is a 89y old  Female who presents with a chief complaint of FEVER (30 Jan 2020 20:56)      Subjective/HPI     PAST MEDICAL & SURGICAL HISTORY:  Constipation  Asthma  Hyperlipidemia  Hypertension  Anxiety disorder  Delusional disorder  Hyperlipidemia  Dementia  No significant past surgical history        Medication list         MEDICATIONS  (STANDING):  albuterol/ipratropium for Nebulization 3 milliLiter(s) Nebulizer every 6 hours  buDESOnide    Inhalation Suspension 0.5 milliGRAM(s) Inhalation two times a day  cefTRIAXone   IVPB 1000 milliGRAM(s) IV Intermittent every 24 hours  diVALproex Sprinkle 125 milliGRAM(s) Oral two times a day  famotidine    Tablet 20 milliGRAM(s) Oral daily  folic acid 1 milliGRAM(s) Oral daily  heparin  Injectable 5000 Unit(s) SubCutaneous every 12 hours  lactated ringers. 1000 milliLiter(s) (80 mL/Hr) IV Continuous <Continuous>  lactobacillus acidophilus 1 Tablet(s) Oral every 8 hours  levothyroxine 100 MICROGram(s) Oral daily  metoprolol succinate ER 50 milliGRAM(s) Oral two times a day  polyethylene glycol 3350 17 Gram(s) Oral two times a day  QUEtiapine 25 milliGRAM(s) Oral two times a day  senna 2 Tablet(s) Oral at bedtime    MEDICATIONS  (PRN):  acetaminophen   Tablet .. 650 milliGRAM(s) Oral every 4 hours PRN Temp greater or equal to 38C (100.4F), Mild Pain (1 - 3)  ALPRAZolam 0.25 milliGRAM(s) Oral two times a day PRN agitation  haloperidol    Injectable 0.5 milliGRAM(s) IntraMuscular every 6 hours PRN Agitation ,delusions         Vitals log        ICU Vital Signs Last 24 Hrs  T(C): 37.2 (31 Jan 2020 05:14), Max: 39.4 (30 Jan 2020 10:03)  T(F): 98.9 (31 Jan 2020 05:14), Max: 103 (30 Jan 2020 10:03)  HR: 70 (31 Jan 2020 05:14) (53 - 82)  BP: 150/80 (31 Jan 2020 05:14) (131/77 - 168/74)  BP(mean): --  ABP: --  ABP(mean): --  RR: 18 (31 Jan 2020 05:14) (15 - 20)  SpO2: 97% (31 Jan 2020 05:14) (87% - 100%)           Input and Output:  I&O's Detail    30 Jan 2020 07:01  -  31 Jan 2020 06:45  --------------------------------------------------------  IN:    lactated ringers.: 1990 mL    Oral Fluid: 260 mL    Sodium Chloride 0.9% IV Bolus: 500 mL    Solution: 50 mL  Total IN: 2800 mL    OUT:    Voided: 250 mL  Total OUT: 250 mL    Total NET: 2550 mL          Lab Data                        12.3   14.25 )-----------( 195      ( 30 Jan 2020 11:10 )             37.0     01-30    139  |  100  |  43<H>  ----------------------------<  185<H>  4.0   |  29  |  2.50<H>    Ca    9.4      30 Jan 2020 11:10    TPro  8.4<H>  /  Alb  3.0<L>  /  TBili  1.1  /  DBili  x   /  AST  38<H>  /  ALT  19  /  AlkPhos  105  01-30            Review of Systems	      Objective     Physical Examination    heart s1s2  lung dec BS  abd soft  head nc      Pertinent Lab findings & Imaging      Cally:  NO   Adequate UO     I&O's Detail    30 Jan 2020 07:01  -  31 Jan 2020 06:45  --------------------------------------------------------  IN:    lactated ringers.: 1990 mL    Oral Fluid: 260 mL    Sodium Chloride 0.9% IV Bolus: 500 mL    Solution: 50 mL  Total IN: 2800 mL    OUT:    Voided: 250 mL  Total OUT: 250 mL    Total NET: 2550 mL               Discussed with:     Cultures:	  Culture Results:   Growth in aerobic bottle:  Gram Negative Rods  Growth in anaerobic bottle:  Gram Negative Rods  ***Blood Panel PCR results on this specimen are available  approximately 3 hours after the Gram stain result.***  Gram stain, PCR, and/or culture results may not always  correspond due to difference in methodologies.  ************************************************************  This PCR assay was performed using [employer].  The following targets are tested for: Enterococcus,  vancomycin resistant enterococci, Listeria monocytogenes,  coagulase negative staphylococci, S. aureus,  methicillin resistant S. aureus, Streptococcus agalactiae  (Group B), S. pneumoniae, S. pyogenes (Group A),  Acinetobacter baumannii, Enterobacter cloacae, E. coli,  Klebsiella oxytoca, K. pneumoniae, Proteus sp.,  Serratia marcescens, Haemophilus influenzae,  Neisseria meningitidis, Pseudomonas aeruginosa, Candida  albicans, C. glabrata, C krusei, C parapsilosis,  C. tropicalis and the KPC resistance gene. (01-30 @ 16:55)  Culture Results:   Growth in aerobic bottle:  Gram Negative Rods  Growth in anaerobic bottle:  Gram Negative Rods (01-30 @ 16:42)        Radiology

## 2020-01-31 NOTE — PHYSICAL THERAPY INITIAL EVALUATION ADULT - GENERAL OBSERVATIONS, REHAB EVAL
Pt received lying in bed in NAD /c O2 via NC & external female catheter intact. Pt was A&Ox2. Pt was agreeable to PT /c encouragement.

## 2020-01-31 NOTE — PROGRESS NOTE ADULT - SUBJECTIVE AND OBJECTIVE BOX
PROGRESS NOTE  Patient is a 89y old  Female who presents with a chief complaint of FEVER (2020 14:22)  Chart and available morning labs /imaging are reviewed electronically , urgent issues addressed . More information  is being added upon completion of rounds , when more information is collected and management discussed with consultants , medical staff and social service/case management on the floor     OVERNIGHT  No new issues reported by medical staff . All above noted Patient is resting in a bed comfortably .Confused ,poor mentation .No distress noted   Seen  by id cons due to bacteremia and sepsis   HPI:  89 yo WF , [facility] resident with medical hx of  Anxiety disorder  ,Asthma  ,Constipation  ,Delusional disorder  ,Dementia  ,Hyperlipidemia    Hypertension. bib ems for fever. No further hx available due to dementia  .Urosepsis vs aspiration pneumonia suspected Admitted for septic workup and evaluation ,send blood and urine cx,serial lactate levels ,monitor vitals closely hydration ,monitor urine output and renal profile ,iv abx initiated. Pulmonology evaluation obtained Patient found to have lactate elevation and septic workup was sent Palliative care consult requested ,to discuss advance directives and complete MOLST (2020 12:43)    PAST MEDICAL & SURGICAL HISTORY:  Constipation  Asthma  Hyperlipidemia  Hypertension  Anxiety disorder  Delusional disorder  Dementia  No significant past surgical history      MEDICATIONS  (STANDING):  albuterol/ipratropium for Nebulization 3 milliLiter(s) Nebulizer every 6 hours  buDESOnide    Inhalation Suspension 0.5 milliGRAM(s) Inhalation two times a day  cefTRIAXone   IVPB 1000 milliGRAM(s) IV Intermittent every 24 hours  diVALproex Sprinkle 125 milliGRAM(s) Oral two times a day  famotidine    Tablet 20 milliGRAM(s) Oral daily  folic acid 1 milliGRAM(s) Oral daily  heparin  Injectable 5000 Unit(s) SubCutaneous every 12 hours  lactated ringers. 1000 milliLiter(s) (80 mL/Hr) IV Continuous <Continuous>  lactobacillus acidophilus 1 Tablet(s) Oral every 8 hours  levothyroxine 100 MICROGram(s) Oral daily  metoprolol succinate ER 50 milliGRAM(s) Oral two times a day  polyethylene glycol 3350 17 Gram(s) Oral two times a day  QUEtiapine 25 milliGRAM(s) Oral two times a day  senna 2 Tablet(s) Oral at bedtime    MEDICATIONS  (PRN):  acetaminophen   Tablet .. 650 milliGRAM(s) Oral every 4 hours PRN Temp greater or equal to 38C (100.4F), Mild Pain (1 - 3)  ALPRAZolam 0.25 milliGRAM(s) Oral two times a day PRN agitation  haloperidol    Injectable 0.5 milliGRAM(s) IntraMuscular every 6 hours PRN Agitation ,delusions      OBJECTIVE    T(C): 36.8 (20 @ 09:22), Max: 38.6 (20 @ 16:25)  HR: 69 (20 @ 13:52) (67 - 82)  BP: 165/81 (20 @ 09:22) (131/77 - 168/74)  RR: 19 (20 @ 09:22) (18 - 20)  SpO2: 99% (20 @ 13:52) (87% - 100%)  Wt(kg): --  I&O's Summary    2020 07:01  -  2020 07:00  --------------------------------------------------------  IN: 2800 mL / OUT: 250 mL / NET: 2550 mL          REVIEW OF SYSTEMS:  ROS is unobtainable due to dementia and confusion     PHYSICAL EXAM:  Appearance: NAD. VS past 24 hrs -as above   HEENT:   Moist oral mucosa. Conjunctiva clear b/l.   Neck : supple  Respiratory: Lungs CTAB.  Gastrointestinal:  Soft, nontender. No rebound. No rigidity. BS present	  Cardiovascular: RRR ,S1S2 present  Neurologic: Non-focal. Moving all extremities.  Extremities: No edema. No erythema. No calf tenderness.  Skin: No rashes, No ecchymoses, No cyanosis.	  wounds ,skin lesions-See skin assesment flow sheet   LABS:                        12.3   14.25 )-----------( 195      ( 2020 11:10 )             37.0         140  |  106  |  43<H>  ----------------------------<  181<H>  4.0   |  27  |  1.60<H>    Ca    8.6      2020 14:44    TPro  8.4<H>  /  Alb  3.0<L>  /  TBili  1.1  /  DBili  x   /  AST  38<H>  /  ALT  19  /  AlkPhos  105  01-30    CAPILLARY BLOOD GLUCOSE        PT/INR - ( 2020 11:15 )   PT: 14.2 sec;   INR: 1.26 ratio         PTT - ( 2020 11:15 )  PTT:29.9 sec  Urinalysis Basic - ( 2020 11:15 )    Color: Yellow / Appearance: Turbid / S.015 / pH: x  Gluc: x / Ketone: Trace  / Bili: Negative / Urobili: Negative   Blood: x / Protein: 500 mg/dL / Nitrite: Negative   Leuk Esterase: Moderate / RBC: 25-50 /HPF / WBC 26-50   Sq Epi: x / Non Sq Epi: Few / Bacteria: TNTC        Culture - Blood (collected 2020 16:55)  Source: .Blood Blood-Peripheral  Gram Stain (2020 05:17):    Growth in aerobic bottle:    Gram Negative Rods    Growth in anaerobic bottle:    Gram Negative Rods  Preliminary Report (2020 05:17):    Growth in aerobic bottle:    Gram Negative Rods    Growth in anaerobic bottle:    Gram Negative Rods    ***Blood Panel PCR results on this specimen are available    approximately 3 hours after the Gram stain result.***    Gram stain, PCR, and/or culture results may not always    correspond due to difference in methodologies.    ************************************************************    This PCR assay was performed using [employer].    The following targets are tested for: Enterococcus,    vancomycin resistant enterococci, Listeria monocytogenes,    coagulase negative staphylococci, S. aureus,    methicillin resistant S. aureus, Streptococcus agalactiae    (Group B), S. pneumoniae, S. pyogenes (Group A),    Acinetobacter baumannii, Enterobacter cloacae, E. coli,    Klebsiella oxytoca, K. pneumoniae, Proteus sp.,    Serratia marcescens, Haemophilus influenzae,    Neisseria meningitidis, Pseudomonas aeruginosa, Candida    albicans, C. glabrata, C krusei, C parapsilosis,    C. tropicalis and the KPC resistance gene.  Organism: Blood Culture PCR (2020 05:11)  Organism: Blood Culture PCR (2020 05:11)    Culture - Blood (collected 2020 16:42)  Source: .Blood Blood-Peripheral  Gram Stain (2020 04:28):    Growth in aerobic bottle:    Gram Negative Rods    Growth in anaerobic bottle:    Gram Negative Rods  Preliminary Report (2020 04:28):    Growth in aerobic bottle:    Gram Negative Rods    Growth in anaerobic bottle:    Gram Negative Rods      RADIOLOGY & ADDITIONAL TESTS:< from: Xray Chest 1 View AP/PA (20 @ 12:16) >  EXAM:  XR CHEST AP OR PA 1V                            PROCEDURE DATE:  2020          INTERPRETATION:  Chest one view    HISTORY: Fever    COMPARISON STUDY: 2020    Frontal expiratory view of the chest shows the heart to be similar in size. The lungs show mild right base atelectasis and there is no evidence of pneumothorax nor pleural effusion.    IMPRESSION:  Mild right atelectasis.    Thank you for the courtesy of this referral.    < end of copied text >     reviewed elctronically  ASSESSMENT/PLAN: 	    45minutes spent on this visit, 50% visit time spent in care co-ordination with other attendings and counselling patient  I have discussed care plan with patient and HCP,expressed understanding of problems treatment and their effect and side effects, alternatives in detail,I have asked if they have any questions and concerns and appropriately addressed them to best of my ability

## 2020-01-31 NOTE — PHYSICAL THERAPY INITIAL EVALUATION ADULT - DID THE PATIENT HAVE SURGERY?
n/a/Chest X-Ray: No definite acute finding. CT head: No acute intracranial hemorrhage or mass effect.

## 2020-01-31 NOTE — PROGRESS NOTE ADULT - SUBJECTIVE AND OBJECTIVE BOX
Interval History:    CENTRAL LINE:   [  ] YES       [  ] NO  HARO:                 [  ] YES       [  ] NO         REVIEW OF SYSTEMS:  All Systems below were reviewed and are negative [  ]  HEENT:  ID:  Pulmonary:  Cardiac:  GI:  Renal:  Musculoskeletal:  All other systems above were reviewed and are negative   [  ]      MEDICATIONS  (STANDING):  albuterol/ipratropium for Nebulization 3 milliLiter(s) Nebulizer every 6 hours  buDESOnide    Inhalation Suspension 0.5 milliGRAM(s) Inhalation two times a day  cefTRIAXone   IVPB 1000 milliGRAM(s) IV Intermittent every 24 hours  diVALproex Sprinkle 125 milliGRAM(s) Oral two times a day  famotidine    Tablet 20 milliGRAM(s) Oral daily  folic acid 1 milliGRAM(s) Oral daily  heparin  Injectable 5000 Unit(s) SubCutaneous every 12 hours  lactated ringers. 1000 milliLiter(s) (80 mL/Hr) IV Continuous <Continuous>  lactobacillus acidophilus 1 Tablet(s) Oral every 8 hours  levothyroxine 100 MICROGram(s) Oral daily  metoprolol succinate ER 50 milliGRAM(s) Oral two times a day  polyethylene glycol 3350 17 Gram(s) Oral two times a day  QUEtiapine 25 milliGRAM(s) Oral two times a day  senna 2 Tablet(s) Oral at bedtime    MEDICATIONS  (PRN):  acetaminophen   Tablet .. 650 milliGRAM(s) Oral every 4 hours PRN Temp greater or equal to 38C (100.4F), Mild Pain (1 - 3)  ALPRAZolam 0.25 milliGRAM(s) Oral two times a day PRN agitation  haloperidol    Injectable 0.5 milliGRAM(s) IntraMuscular every 6 hours PRN Agitation ,delusions      Vital Signs Last 24 Hrs  T(C): 38.6 (31 Jan 2020 16:42), Max: 38.6 (31 Jan 2020 16:42)  T(F): 101.4 (31 Jan 2020 16:42), Max: 101.4 (31 Jan 2020 16:42)  HR: 82 (31 Jan 2020 16:42) (67 - 82)  BP: 160/73 (31 Jan 2020 16:42) (131/77 - 165/81)  BP(mean): --  RR: 18 (31 Jan 2020 16:42) (18 - 20)  SpO2: 97% (31 Jan 2020 16:42) (96% - 100%)    I&O's Summary    30 Jan 2020 07:01  -  31 Jan 2020 07:00  --------------------------------------------------------  IN: 2800 mL / OUT: 250 mL / NET: 2550 mL        PHYSICAL EXAM:  HEENT: NC/AT; PERRLA  Neck: Soft; no tenderness  Lungs: CTA bilaterally; no wheezing.   Heart:  Abdomen:  Genital/ Rectal:  Extremities:  Neurologic:  Vascular:      LABORATORY:    CBC Full  -  ( 30 Jan 2020 11:10 )  WBC Count : 14.25 K/uL  RBC Count : 3.73 M/uL  Hemoglobin : 12.3 g/dL  Hematocrit : 37.0 %  Platelet Count - Automated : 195 K/uL  Mean Cell Volume : 99.2 fl  Mean Cell Hemoglobin : 33.0 pg  Mean Cell Hemoglobin Concentration : 33.2 gm/dL  Auto Neutrophil # : 12.59 K/uL  Auto Lymphocyte # : 0.58 K/uL  Auto Monocyte # : 0.89 K/uL  Auto Eosinophil # : 0.01 K/uL  Auto Basophil # : 0.02 K/uL  Auto Neutrophil % : 88.4 %  Auto Lymphocyte % : 4.1 %  Auto Monocyte % : 6.2 %  Auto Eosinophil % : 0.1 %  Auto Basophil % : 0.1 %          01-31    140  |  106  |  43<H>  ----------------------------<  181<H>  4.0   |  27  |  1.60<H>    Ca    8.6      31 Jan 2020 14:44    TPro  8.4<H>  /  Alb  3.0<L>  /  TBili  1.1  /  DBili  x   /  AST  38<H>  /  ALT  19  /  AlkPhos  105  01-30      Rapid Respiratory Viral Panel Result        01-30 @ 22:12  [facility]  Coronovirus --  Adenovirus --  Bordetella Pertussis --  Chlamydia Pneumonia --  Entero/Rhinovirus--  HKU1 Coronovirus --  HMPV Coronovirus --  Influenza A --  Influenza AH1 --  Influenza AH1 2009 --  Influenza AH3 --  Influenza B --  Mycoplasma Pneumoniae --  NL63 Coronovirus --  OC43 Coronovirus --  Parainfluenza 1 --  Parainfluenza 2 --  Parainfluenza 3 --  Parainfluenza 4 --  Resp Syncytial Virus --      Assessment and Plan:          Dane Barrera MD   [phone]. She is afebrile.  Comfortable.     MEDICATIONS  (STANDING):  albuterol/ipratropium for Nebulization 3 milliLiter(s) Nebulizer every 6 hours  buDESOnide    Inhalation Suspension 0.5 milliGRAM(s) Inhalation two times a day  cefTRIAXone   IVPB 1000 milliGRAM(s) IV Intermittent every 24 hours  diVALproex Sprinkle 125 milliGRAM(s) Oral two times a day  famotidine    Tablet 20 milliGRAM(s) Oral daily  folic acid 1 milliGRAM(s) Oral daily  heparin  Injectable 5000 Unit(s) SubCutaneous every 12 hours  lactated ringers. 1000 milliLiter(s) (80 mL/Hr) IV Continuous <Continuous>  lactobacillus acidophilus 1 Tablet(s) Oral every 8 hours  levothyroxine 100 MICROGram(s) Oral daily  metoprolol succinate ER 50 milliGRAM(s) Oral two times a day  polyethylene glycol 3350 17 Gram(s) Oral two times a day  QUEtiapine 25 milliGRAM(s) Oral two times a day  senna 2 Tablet(s) Oral at bedtime    MEDICATIONS  (PRN):  acetaminophen   Tablet .. 650 milliGRAM(s) Oral every 4 hours PRN Temp greater or equal to 38C (100.4F), Mild Pain (1 - 3)  ALPRAZolam 0.25 milliGRAM(s) Oral two times a day PRN agitation  haloperidol    Injectable 0.5 milliGRAM(s) IntraMuscular every 6 hours PRN Agitation ,delusions      Vital Signs Last 24 Hrs  T(C): 38.6 (31 Jan 2020 16:42), Max: 38.6 (31 Jan 2020 16:42)  T(F): 101.4 (31 Jan 2020 16:42), Max: 101.4 (31 Jan 2020 16:42)  HR: 82 (31 Jan 2020 16:42) (67 - 82)  BP: 160/73 (31 Jan 2020 16:42) (131/77 - 165/81)  BP(mean): --  RR: 18 (31 Jan 2020 16:42) (18 - 20)  SpO2: 97% (31 Jan 2020 16:42) (96% - 100%)    I&O's Summary    30 Jan 2020 07:01  -  31 Jan 2020 07:00  --------------------------------------------------------  IN: 2800 mL / OUT: 250 mL / NET: 2550 mL    PHYSICAL EXAM:  HEENT: NC/AT; PERRLA  Neck: Soft; no tenderness  Lungs: CTA bilaterally; no wheezing.   Heart: RRR; no murmurs.  Abdomen: Soft; no tenderness.   Extremities: No ulcers.   Neurologic: Awake.     LABORATORY:    CBC Full  -  ( 30 Jan 2020 11:10 )  WBC Count : 14.25 K/uL  RBC Count : 3.73 M/uL  Hemoglobin : 12.3 g/dL  Hematocrit : 37.0 %  Platelet Count - Automated : 195 K/uL  Mean Cell Volume : 99.2 fl  Mean Cell Hemoglobin : 33.0 pg  Mean Cell Hemoglobin Concentration : 33.2 gm/dL  Auto Neutrophil # : 12.59 K/uL  Auto Lymphocyte # : 0.58 K/uL  Auto Monocyte # : 0.89 K/uL  Auto Eosinophil # : 0.01 K/uL  Auto Basophil # : 0.02 K/uL  Auto Neutrophil % : 88.4 %  Auto Lymphocyte % : 4.1 %  Auto Monocyte % : 6.2 %  Auto Eosinophil % : 0.1 %  Auto Basophil % : 0.1 %      140  |  106  |  43<H>  ----------------------------<  181<H>  4.0   |  27  |  1.60<H>    Ca    8.6      31 Jan 2020 14:44    TPro  8.4<H>  /  Alb  3.0<L>  /  TBili  1.1  /  DBili  x   /  AST  38<H>  /  ALT  19  /  AlkPhos  105  01-30      Assessment and Plan:    1. Sepsis due to UTI.  2. Bacteremia with gram negative rods.    . Continue IV Rocephin.  . Follow blood and urine cultures.  . Repeat blood cultures tomorrow.   . Supportive care.       Dane Barrera MD   [phone].

## 2020-02-01 LAB
-  AMIKACIN: SIGNIFICANT CHANGE UP
-  AMIKACIN: SIGNIFICANT CHANGE UP
-  AMPICILLIN/SULBACTAM: SIGNIFICANT CHANGE UP
-  AMPICILLIN/SULBACTAM: SIGNIFICANT CHANGE UP
-  AMPICILLIN: SIGNIFICANT CHANGE UP
-  AMPICILLIN: SIGNIFICANT CHANGE UP
-  AZTREONAM: SIGNIFICANT CHANGE UP
-  AZTREONAM: SIGNIFICANT CHANGE UP
-  CEFAZOLIN: SIGNIFICANT CHANGE UP
-  CEFAZOLIN: SIGNIFICANT CHANGE UP
-  CEFEPIME: SIGNIFICANT CHANGE UP
-  CEFEPIME: SIGNIFICANT CHANGE UP
-  CEFOXITIN: SIGNIFICANT CHANGE UP
-  CEFOXITIN: SIGNIFICANT CHANGE UP
-  CEFTRIAXONE: SIGNIFICANT CHANGE UP
-  CEFTRIAXONE: SIGNIFICANT CHANGE UP
-  CIPROFLOXACIN: SIGNIFICANT CHANGE UP
-  CIPROFLOXACIN: SIGNIFICANT CHANGE UP
-  ERTAPENEM: SIGNIFICANT CHANGE UP
-  ERTAPENEM: SIGNIFICANT CHANGE UP
-  GENTAMICIN: SIGNIFICANT CHANGE UP
-  GENTAMICIN: SIGNIFICANT CHANGE UP
-  IMIPENEM: SIGNIFICANT CHANGE UP
-  IMIPENEM: SIGNIFICANT CHANGE UP
-  LEVOFLOXACIN: SIGNIFICANT CHANGE UP
-  LEVOFLOXACIN: SIGNIFICANT CHANGE UP
-  MEROPENEM: SIGNIFICANT CHANGE UP
-  MEROPENEM: SIGNIFICANT CHANGE UP
-  NITROFURANTOIN: SIGNIFICANT CHANGE UP
-  PIPERACILLIN/TAZOBACTAM: SIGNIFICANT CHANGE UP
-  PIPERACILLIN/TAZOBACTAM: SIGNIFICANT CHANGE UP
-  TIGECYCLINE: SIGNIFICANT CHANGE UP
-  TOBRAMYCIN: SIGNIFICANT CHANGE UP
-  TOBRAMYCIN: SIGNIFICANT CHANGE UP
-  TRIMETHOPRIM/SULFAMETHOXAZOLE: SIGNIFICANT CHANGE UP
-  TRIMETHOPRIM/SULFAMETHOXAZOLE: SIGNIFICANT CHANGE UP
ALBUMIN SERPL ELPH-MCNC: 2.1 G/DL — LOW (ref 3.3–5)
ALP SERPL-CCNC: 82 U/L — SIGNIFICANT CHANGE UP (ref 40–120)
ALT FLD-CCNC: 19 U/L — SIGNIFICANT CHANGE UP (ref 12–78)
ANION GAP SERPL CALC-SCNC: 7 MMOL/L — SIGNIFICANT CHANGE UP (ref 5–17)
AST SERPL-CCNC: 27 U/L — SIGNIFICANT CHANGE UP (ref 15–37)
BILIRUB SERPL-MCNC: 0.3 MG/DL — SIGNIFICANT CHANGE UP (ref 0.2–1.2)
BUN SERPL-MCNC: 47 MG/DL — HIGH (ref 7–23)
CALCIUM SERPL-MCNC: 8.5 MG/DL — SIGNIFICANT CHANGE UP (ref 8.5–10.1)
CHLORIDE SERPL-SCNC: 106 MMOL/L — SIGNIFICANT CHANGE UP (ref 96–108)
CO2 SERPL-SCNC: 30 MMOL/L — SIGNIFICANT CHANGE UP (ref 22–31)
CREAT SERPL-MCNC: 1.6 MG/DL — HIGH (ref 0.5–1.3)
CULTURE RESULTS: SIGNIFICANT CHANGE UP
GLUCOSE SERPL-MCNC: 126 MG/DL — HIGH (ref 70–99)
HCT VFR BLD CALC: 32.3 % — LOW (ref 34.5–45)
HGB BLD-MCNC: 10.3 G/DL — LOW (ref 11.5–15.5)
INR BLD: 1.11 RATIO — SIGNIFICANT CHANGE UP (ref 0.88–1.16)
LACTATE SERPL-SCNC: 2.2 MMOL/L — HIGH (ref 0.7–2)
MCHC RBC-ENTMCNC: 31.9 GM/DL — LOW (ref 32–36)
MCHC RBC-ENTMCNC: 32.3 PG — SIGNIFICANT CHANGE UP (ref 27–34)
MCV RBC AUTO: 101.3 FL — HIGH (ref 80–100)
METHOD TYPE: SIGNIFICANT CHANGE UP
METHOD TYPE: SIGNIFICANT CHANGE UP
NRBC # BLD: 0 /100 WBCS — SIGNIFICANT CHANGE UP (ref 0–0)
ORGANISM # SPEC MICROSCOPIC CNT: SIGNIFICANT CHANGE UP
PHOSPHATE SERPL-MCNC: 3.3 MG/DL — SIGNIFICANT CHANGE UP (ref 2.5–4.5)
PLATELET # BLD AUTO: 152 K/UL — SIGNIFICANT CHANGE UP (ref 150–400)
POTASSIUM SERPL-MCNC: 4.2 MMOL/L — SIGNIFICANT CHANGE UP (ref 3.5–5.3)
POTASSIUM SERPL-SCNC: 4.2 MMOL/L — SIGNIFICANT CHANGE UP (ref 3.5–5.3)
PROT SERPL-MCNC: 6.9 G/DL — SIGNIFICANT CHANGE UP (ref 6–8.3)
PROTHROM AB SERPL-ACNC: 12.5 SEC — SIGNIFICANT CHANGE UP (ref 10–12.9)
RBC # BLD: 3.19 M/UL — LOW (ref 3.8–5.2)
RBC # FLD: 14.7 % — HIGH (ref 10.3–14.5)
SODIUM SERPL-SCNC: 143 MMOL/L — SIGNIFICANT CHANGE UP (ref 135–145)
SPECIMEN SOURCE: SIGNIFICANT CHANGE UP
WBC # BLD: 10.02 K/UL — SIGNIFICANT CHANGE UP (ref 3.8–10.5)
WBC # FLD AUTO: 10.02 K/UL — SIGNIFICANT CHANGE UP (ref 3.8–10.5)

## 2020-02-01 RX ADMIN — Medication 100 MICROGRAM(S): at 05:16

## 2020-02-01 RX ADMIN — SODIUM CHLORIDE 80 MILLILITER(S): 9 INJECTION, SOLUTION INTRAVENOUS at 23:33

## 2020-02-01 RX ADMIN — Medication 3 MILLILITER(S): at 07:11

## 2020-02-01 RX ADMIN — Medication 50 MILLIGRAM(S): at 17:29

## 2020-02-01 RX ADMIN — Medication 1 TABLET(S): at 05:15

## 2020-02-01 RX ADMIN — POLYETHYLENE GLYCOL 3350 17 GRAM(S): 17 POWDER, FOR SOLUTION ORAL at 17:29

## 2020-02-01 RX ADMIN — Medication 0.5 MILLIGRAM(S): at 07:11

## 2020-02-01 RX ADMIN — SODIUM CHLORIDE 80 MILLILITER(S): 9 INJECTION, SOLUTION INTRAVENOUS at 10:42

## 2020-02-01 RX ADMIN — POLYETHYLENE GLYCOL 3350 17 GRAM(S): 17 POWDER, FOR SOLUTION ORAL at 05:16

## 2020-02-01 RX ADMIN — Medication 50 MILLIGRAM(S): at 05:15

## 2020-02-01 RX ADMIN — Medication 1 TABLET(S): at 21:23

## 2020-02-01 RX ADMIN — Medication 1 TABLET(S): at 14:34

## 2020-02-01 RX ADMIN — DIVALPROEX SODIUM 125 MILLIGRAM(S): 500 TABLET, DELAYED RELEASE ORAL at 17:29

## 2020-02-01 RX ADMIN — Medication 1 MILLIGRAM(S): at 12:07

## 2020-02-01 RX ADMIN — QUETIAPINE FUMARATE 25 MILLIGRAM(S): 200 TABLET, FILM COATED ORAL at 17:30

## 2020-02-01 RX ADMIN — FAMOTIDINE 20 MILLIGRAM(S): 10 INJECTION INTRAVENOUS at 12:07

## 2020-02-01 RX ADMIN — QUETIAPINE FUMARATE 25 MILLIGRAM(S): 200 TABLET, FILM COATED ORAL at 05:16

## 2020-02-01 RX ADMIN — CEFTRIAXONE 100 MILLIGRAM(S): 500 INJECTION, POWDER, FOR SOLUTION INTRAMUSCULAR; INTRAVENOUS at 05:03

## 2020-02-01 RX ADMIN — DIVALPROEX SODIUM 125 MILLIGRAM(S): 500 TABLET, DELAYED RELEASE ORAL at 05:15

## 2020-02-01 RX ADMIN — Medication 3 MILLILITER(S): at 13:27

## 2020-02-01 RX ADMIN — HEPARIN SODIUM 5000 UNIT(S): 5000 INJECTION INTRAVENOUS; SUBCUTANEOUS at 05:16

## 2020-02-01 RX ADMIN — HEPARIN SODIUM 5000 UNIT(S): 5000 INJECTION INTRAVENOUS; SUBCUTANEOUS at 17:30

## 2020-02-01 RX ADMIN — SENNA PLUS 2 TABLET(S): 8.6 TABLET ORAL at 21:23

## 2020-02-01 RX ADMIN — Medication 3 MILLILITER(S): at 20:11

## 2020-02-01 RX ADMIN — Medication 0.5 MILLIGRAM(S): at 20:10

## 2020-02-01 NOTE — PROGRESS NOTE ADULT - SUBJECTIVE AND OBJECTIVE BOX
Patient is a 89y Female whom presented to the hospital with ckd and josue     PAST MEDICAL & SURGICAL HISTORY:  Constipation  Asthma  Hyperlipidemia  Hypertension  Anxiety disorder  Delusional disorder  Dementia  No significant past surgical history      MEDICATIONS  (STANDING):  albuterol/ipratropium for Nebulization 3 milliLiter(s) Nebulizer every 6 hours  buDESOnide    Inhalation Suspension 0.5 milliGRAM(s) Inhalation two times a day  cefTRIAXone   IVPB 1000 milliGRAM(s) IV Intermittent every 24 hours  diVALproex Sprinkle 125 milliGRAM(s) Oral two times a day  famotidine    Tablet 20 milliGRAM(s) Oral daily  folic acid 1 milliGRAM(s) Oral daily  heparin  Injectable 5000 Unit(s) SubCutaneous every 12 hours  lactated ringers. 1000 milliLiter(s) (80 mL/Hr) IV Continuous <Continuous>  lactobacillus acidophilus 1 Tablet(s) Oral every 8 hours  levothyroxine 100 MICROGram(s) Oral daily  metoprolol succinate ER 50 milliGRAM(s) Oral two times a day  polyethylene glycol 3350 17 Gram(s) Oral two times a day  QUEtiapine 25 milliGRAM(s) Oral two times a day  senna 2 Tablet(s) Oral at bedtime      Allergies    aspirin (Anaphylaxis)  aspirin (Unknown)  mangoes (Unknown)  mangos (Unknown)  shellfish (Unknown)    Intolerances        SOCIAL HISTORY:  Denies ETOh,Smoking,     FAMILY HISTORY:  No pertinent family history in first degree relatives      REVIEW OF SYSTEMS:    unable to obtained a good review system                                                    10.3   10.02 )-----------( 152      ( 01 Feb 2020 07:08 )             32.3       CBC Full  -  ( 01 Feb 2020 07:08 )  WBC Count : 10.02 K/uL  RBC Count : 3.19 M/uL  Hemoglobin : 10.3 g/dL  Hematocrit : 32.3 %  Platelet Count - Automated : 152 K/uL  Mean Cell Volume : 101.3 fl  Mean Cell Hemoglobin : 32.3 pg  Mean Cell Hemoglobin Concentration : 31.9 gm/dL  Auto Neutrophil # : x  Auto Lymphocyte # : x  Auto Monocyte # : x  Auto Eosinophil # : x  Auto Basophil # : x  Auto Neutrophil % : x  Auto Lymphocyte % : x  Auto Monocyte % : x  Auto Eosinophil % : x  Auto Basophil % : x      02-01    143  |  106  |  47<H>  ----------------------------<  126<H>  4.2   |  30  |  1.60<H>    Ca    8.5      01 Feb 2020 07:08  Phos  3.3     02-01    TPro  6.9  /  Alb  2.1<L>  /  TBili  0.3  /  DBili  x   /  AST  27  /  ALT  19  /  AlkPhos  82  02-01      CAPILLARY BLOOD GLUCOSE          Vital Signs Last 24 Hrs  T(C): 37.2 (01 Feb 2020 08:12), Max: 38.6 (31 Jan 2020 16:42)  T(F): 99 (01 Feb 2020 08:12), Max: 101.4 (31 Jan 2020 16:42)  HR: 72 (01 Feb 2020 13:27) (61 - 88)  BP: 156/78 (01 Feb 2020 08:12) (147/97 - 160/73)  BP(mean): --  RR: 18 (01 Feb 2020 08:12) (18 - 18)  SpO2: 99% (01 Feb 2020 08:12) (96% - 99%)        PT/INR - ( 01 Feb 2020 07:08 )   PT: 12.5 sec;   INR: 1.11 ratio                       PHYSICAL EXAM:    Constitutional: NAD  HEENT: conjunctive   clear   Neck:  No JVD  Respiratory: decreases bs b/l   Cardiovascular: S1 and S2  Gastrointestinal: BS+, soft, NT/ND  Extremities: No peripheral edema  Neurological:  no focal deficits  Psychiatric: Normal mood, normal affect  Skin: dry   Access: Not applicable

## 2020-02-01 NOTE — DIETITIAN INITIAL EVALUATION ADULT. - PERTINENT MEDS FT
Lactated Ringers, Pepcid, Folic acid, Lactobacillus acidophilus, Synthroid, Miralax, Toprol XL, Senna

## 2020-02-01 NOTE — PROGRESS NOTE ADULT - ASSESSMENT
DISPOSITION/ PLAN    89 f PMH dementia anxiety asthma delusional disorder HLD hytn presented 2020 with fever HENRIK lacticemi  likely sec to UTI and dehydration She was found to have E coli bacteremia ()      INFECTION E coli bacteremia Rpcephin ()   DEHYDRATION iv fluids     Clinically improved      TIME SPENT Over 25 minutes aggregate care time spent on encounter; activities included   direct pt care, counseling and/or coordinating care reviewing notes, lab data/ imaging , discussion with multidisciplinary team/ pt /family. Risks, benefits, alternatives  discussed in detail.    YOVANI HIGGINS [facility] P [phone]  1929 DOA 2020 DR DEMETRIA ELIAS

## 2020-02-01 NOTE — DIETITIAN INITIAL EVALUATION ADULT. - ADD RECOMMEND
1) Continue with current Dysphagia 2 mechanical soft with nectar thick liquids as per SLP/ MD recommendations, 2) Continue with Ensure Enlive, RN encouraged to provide pt with small sips of supplement throughout the day, 3) Encourage adequate PO intake, 4) Recommend MVI, Vitamin C,  5) Monitor pt's PO intake, weight, skin, edema, GI distress

## 2020-02-01 NOTE — PROGRESS NOTE ADULT - SUBJECTIVE AND OBJECTIVE BOX
Date/Time Patient Seen:  		  Referring MD:   Data Reviewed	       Patient is a 89y old  Female who presents with a chief complaint of FEVER (31 Jan 2020 19:21)      Subjective/HPI     PAST MEDICAL & SURGICAL HISTORY:  Constipation  Asthma  Hyperlipidemia  Hypertension  Anxiety disorder  Delusional disorder  Hyperlipidemia  Dementia  No significant past surgical history        Medication list         MEDICATIONS  (STANDING):  albuterol/ipratropium for Nebulization 3 milliLiter(s) Nebulizer every 6 hours  buDESOnide    Inhalation Suspension 0.5 milliGRAM(s) Inhalation two times a day  cefTRIAXone   IVPB 1000 milliGRAM(s) IV Intermittent every 24 hours  diVALproex Sprinkle 125 milliGRAM(s) Oral two times a day  famotidine    Tablet 20 milliGRAM(s) Oral daily  folic acid 1 milliGRAM(s) Oral daily  heparin  Injectable 5000 Unit(s) SubCutaneous every 12 hours  lactated ringers. 1000 milliLiter(s) (80 mL/Hr) IV Continuous <Continuous>  lactobacillus acidophilus 1 Tablet(s) Oral every 8 hours  levothyroxine 100 MICROGram(s) Oral daily  metoprolol succinate ER 50 milliGRAM(s) Oral two times a day  polyethylene glycol 3350 17 Gram(s) Oral two times a day  QUEtiapine 25 milliGRAM(s) Oral two times a day  senna 2 Tablet(s) Oral at bedtime    MEDICATIONS  (PRN):  acetaminophen   Tablet .. 650 milliGRAM(s) Oral every 4 hours PRN Temp greater or equal to 38C (100.4F), Mild Pain (1 - 3)  ALPRAZolam 0.25 milliGRAM(s) Oral two times a day PRN agitation  haloperidol    Injectable 0.5 milliGRAM(s) IntraMuscular every 6 hours PRN Agitation ,delusions         Vitals log        ICU Vital Signs Last 24 Hrs  T(C): 37.4 (01 Feb 2020 05:14), Max: 38.6 (31 Jan 2020 16:42)  T(F): 99.4 (01 Feb 2020 05:14), Max: 101.4 (31 Jan 2020 16:42)  HR: 73 (01 Feb 2020 05:14) (61 - 88)  BP: 147/97 (01 Feb 2020 05:14) (147/97 - 165/81)  BP(mean): --  ABP: --  ABP(mean): --  RR: 18 (01 Feb 2020 05:14) (18 - 19)  SpO2: 99% (01 Feb 2020 05:14) (96% - 99%)           Input and Output:  I&O's Detail    30 Jan 2020 07:01  -  31 Jan 2020 07:00  --------------------------------------------------------  IN:    lactated ringers.: 1990 mL    Oral Fluid: 260 mL    Sodium Chloride 0.9% IV Bolus: 500 mL    Solution: 50 mL  Total IN: 2800 mL    OUT:    Voided: 250 mL  Total OUT: 250 mL    Total NET: 2550 mL      31 Jan 2020 07:01  -  01 Feb 2020 06:37  --------------------------------------------------------  IN:    lactated ringers.: 960 mL    Solution: 50 mL  Total IN: 1010 mL    OUT:    Voided: 350 mL  Total OUT: 350 mL    Total NET: 660 mL          Lab Data                        12.3   14.25 )-----------( 195      ( 30 Jan 2020 11:10 )             37.0     01-31    140  |  106  |  43<H>  ----------------------------<  181<H>  4.0   |  27  |  1.60<H>    Ca    8.6      31 Jan 2020 14:44    TPro  8.4<H>  /  Alb  3.0<L>  /  TBili  1.1  /  DBili  x   /  AST  38<H>  /  ALT  19  /  AlkPhos  105  01-30            Review of Systems	      Objective     Physical Examination    heart 1s2  lung dec BS  abd soft  head nc  on o2 support      Pertinent Lab findings & Imaging      Cally:  NO   Adequate UO     I&O's Detail    30 Jan 2020 07:01  -  31 Jan 2020 07:00  --------------------------------------------------------  IN:    lactated ringers.: 1990 mL    Oral Fluid: 260 mL    Sodium Chloride 0.9% IV Bolus: 500 mL    Solution: 50 mL  Total IN: 2800 mL    OUT:    Voided: 250 mL  Total OUT: 250 mL    Total NET: 2550 mL      31 Jan 2020 07:01  -  01 Feb 2020 06:37  --------------------------------------------------------  IN:    lactated ringers.: 960 mL    Solution: 50 mL  Total IN: 1010 mL    OUT:    Voided: 350 mL  Total OUT: 350 mL    Total NET: 660 mL               Discussed with:     Cultures:	        Radiology

## 2020-02-01 NOTE — DIETITIAN INITIAL EVALUATION ADULT. - OTHER INFO
As per chart pt is a 89 year old female LUZI resident with PMH of  Anxiety disorder  ,Asthma  ,Constipation  ,Delusional disorder  ,Dementia  ,Hyperlipidemia, Hypertension. bib ems for fever. No further hx available due to dementia  .Urosepsis vs aspiration pneumonia suspected Admitted for septic workup and evaluation.    Pt with dementia, unable to obtain information from pt, information obtained from transfer records and RN. Per transfer records pt was on a Regular diet PTA. Pt with food allergies to mangos and shellfish.    Per RN pt currently with poor appetite and PO intake, states that she takes a small amount of food and then spits it out, per RN pt is currently more alert and was going to try to give the pt some Ensure. Pt's admission weight per chart 136.14lbs. Per transfer record pt's weight is 137lbs. Pt visually appears well nourished at this time. No GI distress noted at this time, last BM 1/30.    Stage 2 left lateral greater toe pressure injury  +b/l leg, feet edema

## 2020-02-01 NOTE — PROGRESS NOTE ADULT - ASSESSMENT
89 yo WF , LUIZ resident with medical hx of  Anxiety disorder  ,Asthma  ,Constipation  ,Delusional disorder  ,Dementia  ,Hyperlipidemia    Hypertension. bib ems for fever. No further hx available due to dementia  .Urosepsis vs aspiration pneumonia suspected Admitted for septic workup and evaluation ,send blood and urine cx,serial lactate levels ,monitor vitals closely hydration ,monitor urine output and renal profile ,iv abx initiated. Pulmonology evaluation obtained Patient found to have lactate elevation and septic workup was sent Palliative care consult requested ,  now with josue

## 2020-02-01 NOTE — PROGRESS NOTE ADULT - SUBJECTIVE AND OBJECTIVE BOX
YOVANI HIGGINS [facility] P [phone]  1929 DOA 2020 DR DEMETRIA ELIAS  ALLERGY asa mangos shellfish        CONTACT  Datr Syl Cole  sel                    REVIEW OF SYMPTOMS      Able to give ROS  Yes     RELIABLE No   CONSTITUTIONAL Weakness Yes  Chills No Vision changes No  ENDOCRINE No unexplained hair loss No heat or cold intolerance    ALLERGY No hives  Sore throat No   RESP Coughing blood no  Shortness of breath YES   NEURO No Headache  Confusion Pain neck No   CARDIAC No Chest pain No Palpitations   GI No Pain abdomen NO   Vomiting NO     PHYSICAL EXAM    HEENT Unremarkable PERRLA atraumatic   RESP Fair air entry EXP prolonged    Harsh breath sound Resp distres mild   CARDIAC S1 S2 No S3     NO JVD    ABDOMEN SOFT BS PRESENT NOT DISTENDED No hepatosplenomegaly PEDAL EDEMA present No calf tenderness  NO rash   GENERAL Not TOXIC looking    VITALS/LABS       2020 99f 73 147/97 18 99%   2020 W 10 Hb 10.3 Plt 152 INR 1.1 Na 143 K 4.2 CO2 30 Cr 1.6    PT DATA/BEST PRACTICE  # ALLERGY shellfish mangos aspiri  ADVANCED DIRECTIVE       Goals of care discussion  WT  62 (2020)   BMI   25  (2020)                                                                                                       HEAD OF BED ELEVATION Yes  DYSPHAGIA EVAL   # DIET     Dys 2 mech soft thin (2020)     # IV F    LR 80 () <--NS 50 (2020)    BOLUS   2020 12p                                  # DVT PROPHYLAXIS        hpsc (2020)   # ABIO   Rocephin  ()                  STRESS ULCER PROPHYLAXIS              famotidine 20 (2020)   INFECTION PPLX  ECHO                CXR 2020 CXR Mild r atelectasis   GLYCEMIC CONTROL  PROCEDURE    # MICROBIO    blod culture e coli    RVP n   2020 Flu AB n RSV n                                                                        PATIENT DATA ASSESSMENT PLAN                                     RESP GAS EXCHANGE 2020 RA 96%  Target PO 90-95%  HEMODYNAMICS 2020 /69 Target MAP 65     E COLI BACTEREMIA ()  LIKELY SEC TO UTI   FEVER 103 POA 2020   LEUKOCYTOSIS POA 202030-2020 W 14.2 - 10    E COLI BACTEREMIA POA 2020 Blod culture e coli  UTI POA 2020 UA W 25-50 R 25-50 Blod larg Nitr n L estr Mod bact tntc  LACTICEMIA POA 2020   l ---2020 LA 5.7 -4.2-2.4-2.2   a/r Rx with Rocephin iv fluids Monitor LA BP Target MAP 65 Check rvp         ATELECTASIS POA 2020 CXR Mild R base atelectasis    Inc rabia  COPD Duoneb ()      HENRIK POA 2020   Cr --2020 Cr 0.7-2.5 - 1.6   a/r IV fluids Serial monitoring     HYPOALBUMINEMIA POA 2020 alb 3

## 2020-02-01 NOTE — DIETITIAN INITIAL EVALUATION ADULT. - FACTORS AFF FOOD INTAKE
Pt seen by SLP on 1/30 recommending [place] thick liquids and solid texture deferred to MD as pt refused./difficulty swallowing

## 2020-02-01 NOTE — PROGRESS NOTE ADULT - SUBJECTIVE AND OBJECTIVE BOX
PROGRESS NOTE  Patient is a 89y old  Female who presents with a chief complaint of FEVER (01 Feb 2020 06:37)  Chart and available morning labs /imaging are reviewed electronically , urgent issues addressed . More information  is being added upon completion of rounds , when more information is collected and management discussed with consultants , medical staff and social service/case management on the floor   LATE ENTRY- patient was seen and examined earlier today . Plan of care was discussed with med staff and unit coordinator .   OVERNIGHT  No new issues reported by medical staff . All above noted Patient is resting in a bed comfortably .Confused ,poor mentation .No distress noted     HPI:  89 yo  , [facility] resident with medical hx of  Anxiety disorder  ,Asthma  ,Constipation  ,Delusional disorder  ,Dementia  ,Hyperlipidemia    Hypertension. bib ems for fever. No further hx available due to dementia  .Urosepsis vs aspiration pneumonia suspected Admitted for septic workup and evaluation ,send blood and urine cx,serial lactate levels ,monitor vitals closely hydration ,monitor urine output and renal profile ,iv abx initiated. Pulmonology evaluation obtained Patient found to have lactate elevation and septic workup was sent Palliative care consult requested ,to discuss advance directives and complete MOLST (30 Jan 2020 12:43)    PAST MEDICAL & SURGICAL HISTORY:  Constipation  Asthma  Hyperlipidemia  Hypertension  Anxiety disorder  Delusional disorder  Dementia  No significant past surgical history      MEDICATIONS  (STANDING):  albuterol/ipratropium for Nebulization 3 milliLiter(s) Nebulizer every 6 hours  buDESOnide    Inhalation Suspension 0.5 milliGRAM(s) Inhalation two times a day  cefTRIAXone   IVPB 1000 milliGRAM(s) IV Intermittent every 24 hours  diVALproex Sprinkle 125 milliGRAM(s) Oral two times a day  famotidine    Tablet 20 milliGRAM(s) Oral daily  folic acid 1 milliGRAM(s) Oral daily  heparin  Injectable 5000 Unit(s) SubCutaneous every 12 hours  lactated ringers. 1000 milliLiter(s) (80 mL/Hr) IV Continuous <Continuous>  lactobacillus acidophilus 1 Tablet(s) Oral every 8 hours  levothyroxine 100 MICROGram(s) Oral daily  metoprolol succinate ER 50 milliGRAM(s) Oral two times a day  polyethylene glycol 3350 17 Gram(s) Oral two times a day  QUEtiapine 25 milliGRAM(s) Oral two times a day  senna 2 Tablet(s) Oral at bedtime    MEDICATIONS  (PRN):  acetaminophen   Tablet .. 650 milliGRAM(s) Oral every 4 hours PRN Temp greater or equal to 38C (100.4F), Mild Pain (1 - 3)  ALPRAZolam 0.25 milliGRAM(s) Oral two times a day PRN agitation  haloperidol    Injectable 0.5 milliGRAM(s) IntraMuscular every 6 hours PRN Agitation ,delusions      OBJECTIVE    T(C): 37.2 (02-01-20 @ 08:12), Max: 38.6 (01-31-20 @ 16:42)  HR: 72 (02-01-20 @ 13:27) (61 - 88)  BP: 156/78 (02-01-20 @ 08:12) (147/97 - 160/73)  RR: 18 (02-01-20 @ 08:12) (18 - 18)  SpO2: 99% (02-01-20 @ 08:12) (96% - 99%)  Wt(kg): --  I&O's Summary    31 Jan 2020 07:01  -  01 Feb 2020 07:00  --------------------------------------------------------  IN: 1010 mL / OUT: 350 mL / NET: 660 mL          REVIEW OF SYSTEMS:  ROS is unobtainable due to dementia and confusion     PHYSICAL EXAM:  Appearance: NAD. VS past 24 hrs -as above   HEENT:   Moist oral mucosa. Conjunctiva clear b/l.   Neck : supple  Respiratory: Lungs CTAB.  Gastrointestinal:  Soft, nontender. No rebound. No rigidity. BS present	  Cardiovascular: RRR ,S1S2 present  Neurologic: Non-focal. Moving all extremities.  Extremities: No edema. No erythema. No calf tenderness.  Skin: No rashes, No ecchymoses, No cyanosis.	  wounds ,skin lesions-See skin assesment flow sheet   LABS:                        10.3   10.02 )-----------( 152      ( 01 Feb 2020 07:08 )             32.3     02-01    143  |  106  |  47<H>  ----------------------------<  126<H>  4.2   |  30  |  1.60<H>    Ca    8.5      01 Feb 2020 07:08  Phos  3.3     02-01    TPro  6.9  /  Alb  2.1<L>  /  TBili  0.3  /  DBili  x   /  AST  27  /  ALT  19  /  AlkPhos  82  02-01    CAPILLARY BLOOD GLUCOSE        PT/INR - ( 01 Feb 2020 07:08 )   PT: 12.5 sec;   INR: 1.11 ratio               Culture - Blood (collected 30 Jan 2020 16:55)  Source: .Blood Blood-Peripheral  Gram Stain (31 Jan 2020 05:17):    Growth in aerobic bottle:    Gram Negative Rods    Growth in anaerobic bottle:    Gram Negative Rods  Preliminary Report (01 Feb 2020 07:42):    Growth in aerobic bottle: Escherichia coli    Growth in anaerobic bottle:    Escherichia coli    ***Blood Panel PCR results on this specimen are available    approximately 3 hours after the Gram stain result.***    Gram stain, PCR, and/or culture results maynot always    correspond due to difference in methodologies.    ************************************************************    This PCR assay was performed using [employer].    The following targets are tested for: Enterococcus,    vancomycin resistantenterococci, Listeria monocytogenes,    coagulase negative staphylococci, S. aureus,    methicillin resistant S. aureus, Streptococcus agalactiae    (Group B), S. pneumoniae, S. pyogenes (Group A),    Acinetobacter baumannii, Enterobacter cloacae, E. coli,    Klebsiella oxytoca, K. pneumoniae, Proteus sp.,    Serratia marcescens, Haemophilus influenzae,    Neisseria meningitidis, Pseudomonas aeruginosa, Candida    albicans, C. glabrata, C krusei, C parapsilosis,    C. tropicalis and the KPC resistance gene.  Organism: Blood Culture PCR (31 Jan 2020 05:11)  Organism: Blood Culture PCR (31 Jan 2020 05:11)    Culture - Blood (collected 30 Jan 2020 16:42)  Source: .Blood Blood-Peripheral  Gram Stain (31 Jan 2020 04:28):    Growth in aerobic bottle:    Gram Negative Rods    Growth in anaerobic bottle:    Gram Negative Rods  Preliminary Report (01 Feb 2020 07:49):    Growth in aerobic and anaerobic bottles: Escherichia coli    See previous culture [id number]    Culture - Urine (collected 30 Jan 2020 16:36)  Source: .Urine Catheterized  Preliminary Report (31 Jan 2020 16:33):    >100,000 CFU/ml Gram Negative Rods      RADIOLOGY & ADDITIONAL TESTS: < from: Xray Chest 1 View AP/PA (01.30.20 @ 12:16) >  EXAM:  XR CHEST AP OR PA 1V                            PROCEDURE DATE:  01/30/2020          INTERPRETATION:  Chest one view    HISTORY: Fever    COMPARISON STUDY: 1/16/2020    Frontal expiratory view of the chest shows the heart to be similar in size. The lungs show mild right base atelectasis and there is no evidence of pneumothorax nor pleural effusion.    IMPRESSION:  Mild right atelectasis.    Thank you for the courtesy of this referral.    < end of copied text >     reviewed elctronically  ASSESSMENT/PLAN:

## 2020-02-02 LAB
ANION GAP SERPL CALC-SCNC: 8 MMOL/L — SIGNIFICANT CHANGE UP (ref 5–17)
BUN SERPL-MCNC: 46 MG/DL — HIGH (ref 7–23)
CALCIUM SERPL-MCNC: 8.4 MG/DL — LOW (ref 8.5–10.1)
CHLORIDE SERPL-SCNC: 108 MMOL/L — SIGNIFICANT CHANGE UP (ref 96–108)
CO2 SERPL-SCNC: 28 MMOL/L — SIGNIFICANT CHANGE UP (ref 22–31)
CREAT SERPL-MCNC: 1.3 MG/DL — SIGNIFICANT CHANGE UP (ref 0.5–1.3)
GLUCOSE SERPL-MCNC: 114 MG/DL — HIGH (ref 70–99)
HCT VFR BLD CALC: 29.8 % — LOW (ref 34.5–45)
HGB BLD-MCNC: 9.7 G/DL — LOW (ref 11.5–15.5)
LACTATE SERPL-SCNC: 0.6 MMOL/L — LOW (ref 0.7–2)
LEGIONELLA AG UR QL: NEGATIVE — SIGNIFICANT CHANGE UP
MCHC RBC-ENTMCNC: 32.3 PG — SIGNIFICANT CHANGE UP (ref 27–34)
MCHC RBC-ENTMCNC: 32.6 GM/DL — SIGNIFICANT CHANGE UP (ref 32–36)
MCV RBC AUTO: 99.3 FL — SIGNIFICANT CHANGE UP (ref 80–100)
NRBC # BLD: 0 /100 WBCS — SIGNIFICANT CHANGE UP (ref 0–0)
PLATELET # BLD AUTO: 157 K/UL — SIGNIFICANT CHANGE UP (ref 150–400)
POTASSIUM SERPL-MCNC: 3.9 MMOL/L — SIGNIFICANT CHANGE UP (ref 3.5–5.3)
POTASSIUM SERPL-SCNC: 3.9 MMOL/L — SIGNIFICANT CHANGE UP (ref 3.5–5.3)
RBC # BLD: 3 M/UL — LOW (ref 3.8–5.2)
RBC # FLD: 14.6 % — HIGH (ref 10.3–14.5)
SODIUM SERPL-SCNC: 144 MMOL/L — SIGNIFICANT CHANGE UP (ref 135–145)
WBC # BLD: 9.44 K/UL — SIGNIFICANT CHANGE UP (ref 3.8–10.5)
WBC # FLD AUTO: 9.44 K/UL — SIGNIFICANT CHANGE UP (ref 3.8–10.5)

## 2020-02-02 RX ORDER — ERTAPENEM SODIUM 1 G/1
500 INJECTION, POWDER, LYOPHILIZED, FOR SOLUTION INTRAMUSCULAR; INTRAVENOUS EVERY 24 HOURS
Refills: 0 | Status: DISCONTINUED | OUTPATIENT
Start: 2020-02-02 | End: 2020-02-04

## 2020-02-02 RX ADMIN — HEPARIN SODIUM 5000 UNIT(S): 5000 INJECTION INTRAVENOUS; SUBCUTANEOUS at 17:01

## 2020-02-02 RX ADMIN — SENNA PLUS 2 TABLET(S): 8.6 TABLET ORAL at 22:46

## 2020-02-02 RX ADMIN — Medication 50 MILLIGRAM(S): at 17:01

## 2020-02-02 RX ADMIN — DIVALPROEX SODIUM 125 MILLIGRAM(S): 500 TABLET, DELAYED RELEASE ORAL at 05:19

## 2020-02-02 RX ADMIN — POLYETHYLENE GLYCOL 3350 17 GRAM(S): 17 POWDER, FOR SOLUTION ORAL at 05:19

## 2020-02-02 RX ADMIN — Medication 0.5 MILLIGRAM(S): at 21:01

## 2020-02-02 RX ADMIN — CEFTRIAXONE 100 MILLIGRAM(S): 500 INJECTION, POWDER, FOR SOLUTION INTRAMUSCULAR; INTRAVENOUS at 05:19

## 2020-02-02 RX ADMIN — Medication 3 MILLILITER(S): at 07:16

## 2020-02-02 RX ADMIN — HEPARIN SODIUM 5000 UNIT(S): 5000 INJECTION INTRAVENOUS; SUBCUTANEOUS at 05:19

## 2020-02-02 RX ADMIN — Medication 50 MILLIGRAM(S): at 05:19

## 2020-02-02 RX ADMIN — DIVALPROEX SODIUM 125 MILLIGRAM(S): 500 TABLET, DELAYED RELEASE ORAL at 17:01

## 2020-02-02 RX ADMIN — Medication 100 MICROGRAM(S): at 05:19

## 2020-02-02 RX ADMIN — Medication 0.5 MILLIGRAM(S): at 07:16

## 2020-02-02 RX ADMIN — Medication 1 MILLIGRAM(S): at 12:46

## 2020-02-02 RX ADMIN — Medication 3 MILLILITER(S): at 21:01

## 2020-02-02 RX ADMIN — POLYETHYLENE GLYCOL 3350 17 GRAM(S): 17 POWDER, FOR SOLUTION ORAL at 17:01

## 2020-02-02 RX ADMIN — QUETIAPINE FUMARATE 25 MILLIGRAM(S): 200 TABLET, FILM COATED ORAL at 05:19

## 2020-02-02 RX ADMIN — Medication 3 MILLILITER(S): at 14:17

## 2020-02-02 RX ADMIN — Medication 1 TABLET(S): at 05:19

## 2020-02-02 RX ADMIN — FAMOTIDINE 20 MILLIGRAM(S): 10 INJECTION INTRAVENOUS at 12:45

## 2020-02-02 RX ADMIN — Medication 1 TABLET(S): at 22:46

## 2020-02-02 RX ADMIN — ERTAPENEM SODIUM 100 MILLIGRAM(S): 1 INJECTION, POWDER, LYOPHILIZED, FOR SOLUTION INTRAMUSCULAR; INTRAVENOUS at 14:15

## 2020-02-02 RX ADMIN — QUETIAPINE FUMARATE 25 MILLIGRAM(S): 200 TABLET, FILM COATED ORAL at 17:01

## 2020-02-02 RX ADMIN — Medication 1 TABLET(S): at 14:15

## 2020-02-02 NOTE — PROVIDER CONTACT NOTE (CRITICAL VALUE NOTIFICATION) - TEST AND RESULT REPORTED:
Positive blood culture in both bottles final growth in aerobic aerobic and anaerobic bottle for ECOLI and ESBL. Urine culture final greater than 100, 000 ECOLI and ESBL .

## 2020-02-02 NOTE — PROGRESS NOTE ADULT - SUBJECTIVE AND OBJECTIVE BOX
Patient is a 89y Female whom presented to the hospital with ckd and josue   pateint seen and examined nad , no fever , no chills    PAST MEDICAL & SURGICAL HISTORY:  Constipation  Asthma  Hyperlipidemia  Hypertension  Anxiety disorder  Delusional disorder  Dementia  No significant past surgical history      MEDICATIONS  (STANDING):  albuterol/ipratropium for Nebulization 3 milliLiter(s) Nebulizer every 6 hours  buDESOnide    Inhalation Suspension 0.5 milliGRAM(s) Inhalation two times a day  cefTRIAXone   IVPB 1000 milliGRAM(s) IV Intermittent every 24 hours  diVALproex Sprinkle 125 milliGRAM(s) Oral two times a day  famotidine    Tablet 20 milliGRAM(s) Oral daily  folic acid 1 milliGRAM(s) Oral daily  heparin  Injectable 5000 Unit(s) SubCutaneous every 12 hours  lactated ringers. 1000 milliLiter(s) (80 mL/Hr) IV Continuous <Continuous>  lactobacillus acidophilus 1 Tablet(s) Oral every 8 hours  levothyroxine 100 MICROGram(s) Oral daily  metoprolol succinate ER 50 milliGRAM(s) Oral two times a day  polyethylene glycol 3350 17 Gram(s) Oral two times a day  QUEtiapine 25 milliGRAM(s) Oral two times a day  senna 2 Tablet(s) Oral at bedtime      Allergies    aspirin (Anaphylaxis)  aspirin (Unknown)  mangoes (Unknown)  mangos (Unknown)  shellfish (Unknown)    Intolerances        SOCIAL HISTORY:  Denies ETOh,Smoking,     FAMILY HISTORY:  No pertinent family history in first degree relatives      REVIEW OF SYSTEMS:    unable to obtained a good review system                                                                 9.7    9.44  )-----------( 157      ( 02 Feb 2020 06:28 )             29.8       CBC Full  -  ( 02 Feb 2020 06:28 )  WBC Count : 9.44 K/uL  RBC Count : 3.00 M/uL  Hemoglobin : 9.7 g/dL  Hematocrit : 29.8 %  Platelet Count - Automated : 157 K/uL  Mean Cell Volume : 99.3 fl  Mean Cell Hemoglobin : 32.3 pg  Mean Cell Hemoglobin Concentration : 32.6 gm/dL  Auto Neutrophil # : x  Auto Lymphocyte # : x  Auto Monocyte # : x  Auto Eosinophil # : x  Auto Basophil # : x  Auto Neutrophil % : x  Auto Lymphocyte % : x  Auto Monocyte % : x  Auto Eosinophil % : x  Auto Basophil % : x      02-02    144  |  108  |  46<H>  ----------------------------<  114<H>  3.9   |  28  |  1.30    Ca    8.4<L>      02 Feb 2020 06:28  Phos  3.3     02-01    TPro  6.9  /  Alb  2.1<L>  /  TBili  0.3  /  DBili  x   /  AST  27  /  ALT  19  /  AlkPhos  82  02-01      CAPILLARY BLOOD GLUCOSE          Vital Signs Last 24 Hrs  T(C): 36.8 (02 Feb 2020 07:22), Max: 37.6 (01 Feb 2020 16:17)  T(F): 98.2 (02 Feb 2020 07:22), Max: 99.7 (01 Feb 2020 16:17)  HR: 66 (02 Feb 2020 08:43) (66 - 86)  BP: 168/82 (02 Feb 2020 08:43) (166/75 - 201/79)  BP(mean): --  RR: 18 (02 Feb 2020 07:22) (16 - 20)  SpO2: 98% (02 Feb 2020 07:22) (93% - 98%)        PT/INR - ( 01 Feb 2020 07:08 )   PT: 12.5 sec;   INR: 1.11 ratio                          PHYSICAL EXAM:    Constitutional: NAD  HEENT: conjunctive   clear   Neck:  No JVD  Respiratory: decreases bs b/l   Cardiovascular: S1 and S2  Gastrointestinal: BS+, soft, NT/ND  Extremities: No peripheral edema  Neurological:  no focal deficits  Psychiatric: Normal mood, normal affect  Skin: dry   Access: Not applicable

## 2020-02-02 NOTE — PROGRESS NOTE ADULT - SUBJECTIVE AND OBJECTIVE BOX
Date/Time Patient Seen:  		  Referring MD:   Data Reviewed	       Patient is a 89y old  Female who presents with a chief complaint of FEVER (01 Feb 2020 14:27)      Subjective/HPI     PAST MEDICAL & SURGICAL HISTORY:  Constipation  Asthma  Hyperlipidemia  Hypertension  Anxiety disorder  Delusional disorder  Hyperlipidemia  Dementia  No significant past surgical history        Medication list         MEDICATIONS  (STANDING):  albuterol/ipratropium for Nebulization 3 milliLiter(s) Nebulizer every 6 hours  buDESOnide    Inhalation Suspension 0.5 milliGRAM(s) Inhalation two times a day  cefTRIAXone   IVPB 1000 milliGRAM(s) IV Intermittent every 24 hours  diVALproex Sprinkle 125 milliGRAM(s) Oral two times a day  famotidine    Tablet 20 milliGRAM(s) Oral daily  folic acid 1 milliGRAM(s) Oral daily  heparin  Injectable 5000 Unit(s) SubCutaneous every 12 hours  lactated ringers. 1000 milliLiter(s) (80 mL/Hr) IV Continuous <Continuous>  lactobacillus acidophilus 1 Tablet(s) Oral every 8 hours  levothyroxine 100 MICROGram(s) Oral daily  metoprolol succinate ER 50 milliGRAM(s) Oral two times a day  polyethylene glycol 3350 17 Gram(s) Oral two times a day  QUEtiapine 25 milliGRAM(s) Oral two times a day  senna 2 Tablet(s) Oral at bedtime    MEDICATIONS  (PRN):  acetaminophen   Tablet .. 650 milliGRAM(s) Oral every 4 hours PRN Temp greater or equal to 38C (100.4F), Mild Pain (1 - 3)  ALPRAZolam 0.25 milliGRAM(s) Oral two times a day PRN agitation  haloperidol    Injectable 0.5 milliGRAM(s) IntraMuscular every 6 hours PRN Agitation ,delusions         Vitals log        ICU Vital Signs Last 24 Hrs  T(C): 37.3 (02 Feb 2020 04:55), Max: 37.6 (01 Feb 2020 16:17)  T(F): 99.2 (02 Feb 2020 04:55), Max: 99.7 (01 Feb 2020 16:17)  HR: 67 (02 Feb 2020 04:55) (67 - 86)  BP: 180/91 (02 Feb 2020 04:55) (156/78 - 192/83)  BP(mean): --  ABP: --  ABP(mean): --  RR: 16 (02 Feb 2020 04:55) (16 - 20)  SpO2: 93% (02 Feb 2020 04:55) (93% - 99%)           Input and Output:  I&O's Detail    31 Jan 2020 07:01  -  01 Feb 2020 07:00  --------------------------------------------------------  IN:    lactated ringers.: 960 mL    Solution: 50 mL  Total IN: 1010 mL    OUT:    Voided: 350 mL  Total OUT: 350 mL    Total NET: 660 mL      01 Feb 2020 07:01  -  02 Feb 2020 06:13  --------------------------------------------------------  IN:  Total IN: 0 mL    OUT:    Voided: 600 mL  Total OUT: 600 mL    Total NET: -600 mL          Lab Data                        10.3   10.02 )-----------( 152      ( 01 Feb 2020 07:08 )             32.3     02-01    143  |  106  |  47<H>  ----------------------------<  126<H>  4.2   |  30  |  1.60<H>    Ca    8.5      01 Feb 2020 07:08  Phos  3.3     02-01    TPro  6.9  /  Alb  2.1<L>  /  TBili  0.3  /  DBili  x   /  AST  27  /  ALT  19  /  AlkPhos  82  02-01            Review of Systems	      Objective     Physical Examination  heart s1s2  lung dec BS  abd soft  head nc  head at  on o2 support        Pertinent Lab findings & Imaging      Cally:  NO   Adequate UO     I&O's Detail    31 Jan 2020 07:01  -  01 Feb 2020 07:00  --------------------------------------------------------  IN:    lactated ringers.: 960 mL    Solution: 50 mL  Total IN: 1010 mL    OUT:    Voided: 350 mL  Total OUT: 350 mL    Total NET: 660 mL      01 Feb 2020 07:01  -  02 Feb 2020 06:13  --------------------------------------------------------  IN:  Total IN: 0 mL    OUT:    Voided: 600 mL  Total OUT: 600 mL    Total NET: -600 mL               Discussed with:     Cultures:	        Radiology

## 2020-02-02 NOTE — PROGRESS NOTE ADULT - SUBJECTIVE AND OBJECTIVE BOX
Patient is a 89y Female with a known history of :  Palliative care encounter (Z51.5)  Dementia (F03.90)  Goals of care, counseling/discussion (Z71.89)  Fever (R50.9)  Prophylactic measure (Z29.9)  Delusional disorder (F22)  Constipation (K59.00)  Anxiety disorder (F41.9)  Hypertension (I10)  Asthma (J45.909)  HENRIK (acute kidney injury) (N17.9)  Sepsis (A41.9)  UTI (urinary tract infection) (N39.0)  Acute febrile illness (R50.9)    HPI:  89 yo  , [facility] resident with medical hx of  Anxiety disorder  ,Asthma  ,Constipation  ,Delusional disorder  ,Dementia  ,Hyperlipidemia    Hypertension. bib ems for fever. No further hx available due to dementia  .Urosepsis vs aspiration pneumonia suspected Admitted for septic workup and evaluation ,send blood and urine cx,serial lactate levels ,monitor vitals closely hydration ,monitor urine output and renal profile ,iv abx initiated. Pulmonology evaluation obtained Patient found to have lactate elevation and septic workup was sent Palliative care consult requested ,to discuss advance directives and complete MOLST (30 Jan 2020 12:43)      REVIEW OF SYSTEMS:    CONSTITUTIONAL: No fever, weight loss, or fatigue  EYES: No eye pain, visual disturbances, or discharge  ENMT:  No difficulty hearing, tinnitus, vertigo; No sinus or throat pain  NECK: No pain or stiffness  BREASTS: No pain, masses, or nipple discharge  RESPIRATORY: No cough, wheezing, chills or hemoptysis; No shortness of breath  CARDIOVASCULAR: No chest pain, palpitations, dizziness, or leg swelling  GASTROINTESTINAL: No abdominal or epigastric pain. No nausea, vomiting, or hematemesis; No diarrhea or constipation. No melena or hematochezia.  GENITOURINARY: No dysuria, frequency, hematuria, or incontinence  NEUROLOGICAL: No headaches, memory loss, loss of strength, numbness, or tremors  SKIN: No itching, burning, rashes, or lesions   LYMPH NODES: No enlarged glands  ENDOCRINE: No heat or cold intolerance; No hair loss  MUSCULOSKELETAL: No joint pain or swelling; No muscle, back, or extremity pain  PSYCHIATRIC: No depression, anxiety, mood swings, or difficulty sleeping  HEME/LYMPH: No easy bruising, or bleeding gums  ALLERGY AND IMMUNOLOGIC: No hives or eczema    MEDICATIONS  (STANDING):  albuterol/ipratropium for Nebulization 3 milliLiter(s) Nebulizer every 6 hours  buDESOnide    Inhalation Suspension 0.5 milliGRAM(s) Inhalation two times a day  cefTRIAXone   IVPB 1000 milliGRAM(s) IV Intermittent every 24 hours  diVALproex Sprinkle 125 milliGRAM(s) Oral two times a day  famotidine    Tablet 20 milliGRAM(s) Oral daily  folic acid 1 milliGRAM(s) Oral daily  heparin  Injectable 5000 Unit(s) SubCutaneous every 12 hours  lactated ringers. 1000 milliLiter(s) (80 mL/Hr) IV Continuous <Continuous>  lactobacillus acidophilus 1 Tablet(s) Oral every 8 hours  levothyroxine 100 MICROGram(s) Oral daily  metoprolol succinate ER 50 milliGRAM(s) Oral two times a day  polyethylene glycol 3350 17 Gram(s) Oral two times a day  QUEtiapine 25 milliGRAM(s) Oral two times a day  senna 2 Tablet(s) Oral at bedtime    MEDICATIONS  (PRN):  acetaminophen   Tablet .. 650 milliGRAM(s) Oral every 4 hours PRN Temp greater or equal to 38C (100.4F), Mild Pain (1 - 3)  ALPRAZolam 0.25 milliGRAM(s) Oral two times a day PRN agitation  haloperidol    Injectable 0.5 milliGRAM(s) IntraMuscular every 6 hours PRN Agitation ,delusions      ALLERGIES: aspirin (Anaphylaxis)  aspirin (Unknown)  mangoes (Unknown)  mangos (Unknown)  shellfish (Unknown)      FAMILY HISTORY:  No pertinent family history in first degree relatives      PHYSICAL EXAMINATION:  -----------------------------  T(C): 36.8 (02-02-20 @ 07:22), Max: 37.6 (02-01-20 @ 16:17)  HR: 66 (02-02-20 @ 08:43) (66 - 86)  BP: 168/82 (02-02-20 @ 08:43) (166/75 - 201/79)  RR: 18 (02-02-20 @ 07:22) (16 - 20)  SpO2: 98% (02-02-20 @ 07:22) (93% - 98%)  Wt(kg): --    02-01 @ 07:01  -  02-02 @ 07:00  --------------------------------------------------------  IN:    lactated ringers.: 960 mL  Total IN: 960 mL    OUT:    Voided: 600 mL  Total OUT: 600 mL    Total NET: 360 mL            Constitutional: well developed, normal appearance, well groomed, well nourished, no deformities and no acute distress.   Eyes: the conjunctiva exhibited no abnormalities and the eyelids demonstrated no xanthelasmas.   HEENT: normal oral mucosa, no oral pallor and no oral cyanosis.   Neck: normal jugular venous A waves present, normal jugular venous V waves present and no jugular venous walsh A waves.   Pulmonary: no respiratory distress, normal respiratory rhythm and effort, no accessory muscle use and lungs were clear to auscultation bilaterally.   Cardiovascular: heart rate and rhythm were normal, normal S1 and S2 and no murmur, gallop, rub, heave or thrill are present.   Abdomen: soft, non-tender, no hepato-splenomegaly and no abdominal mass palpated.   Musculoskeletal: the gait could not be assessed..   Extremities: no clubbing of the fingernails, no localized cyanosis, no petechial hemorrhages and no ischemic changes.   Skin: normal skin color and pigmentation, no rash, no venous stasis, no skin lesions, no skin ulcer and no xanthoma was observed.   Psychiatric: oriented to person, place, and time, the affect was normal, the mood was normal and not feeling anxious.     LABS:   --------  02-02    144  |  108  |  46<H>  ----------------------------<  114<H>  3.9   |  28  |  1.30    Ca    8.4<L>      02 Feb 2020 06:28  Phos  3.3     02-01    TPro  6.9  /  Alb  2.1<L>  /  TBili  0.3  /  DBili  x   /  AST  27  /  ALT  19  /  AlkPhos  82  02-01                         9.7    9.44  )-----------( 157      ( 02 Feb 2020 06:28 )             29.8     PT/INR - ( 01 Feb 2020 07:08 )   PT: 12.5 sec;   INR: 1.11 ratio                     RADIOLOGY:  -----------------        ECG:

## 2020-02-02 NOTE — PROGRESS NOTE ADULT - SUBJECTIVE AND OBJECTIVE BOX
Interval History:    CENTRAL LINE:   [  ] YES       [  ] NO  HARO:                 [  ] YES       [  ] NO         REVIEW OF SYSTEMS:  All Systems below were reviewed and are negative [  ]  HEENT:  ID:  Pulmonary:  Cardiac:  GI:  Renal:  Musculoskeletal:  All other systems above were reviewed and are negative   [  ]      MEDICATIONS  (STANDING):  albuterol/ipratropium for Nebulization 3 milliLiter(s) Nebulizer every 6 hours  buDESOnide    Inhalation Suspension 0.5 milliGRAM(s) Inhalation two times a day  diVALproex Sprinkle 125 milliGRAM(s) Oral two times a day  ertapenem  IVPB 500 milliGRAM(s) IV Intermittent every 24 hours  famotidine    Tablet 20 milliGRAM(s) Oral daily  folic acid 1 milliGRAM(s) Oral daily  heparin  Injectable 5000 Unit(s) SubCutaneous every 12 hours  lactated ringers. 1000 milliLiter(s) (80 mL/Hr) IV Continuous <Continuous>  lactobacillus acidophilus 1 Tablet(s) Oral every 8 hours  levothyroxine 100 MICROGram(s) Oral daily  metoprolol succinate ER 50 milliGRAM(s) Oral two times a day  polyethylene glycol 3350 17 Gram(s) Oral two times a day  QUEtiapine 25 milliGRAM(s) Oral two times a day  senna 2 Tablet(s) Oral at bedtime    MEDICATIONS  (PRN):  acetaminophen   Tablet .. 650 milliGRAM(s) Oral every 4 hours PRN Temp greater or equal to 38C (100.4F), Mild Pain (1 - 3)  ALPRAZolam 0.25 milliGRAM(s) Oral two times a day PRN agitation  haloperidol    Injectable 0.5 milliGRAM(s) IntraMuscular every 6 hours PRN Agitation ,delusions      Vital Signs Last 24 Hrs  T(C): 37.1 (02 Feb 2020 16:58), Max: 37.3 (02 Feb 2020 04:55)  T(F): 98.7 (02 Feb 2020 16:58), Max: 99.2 (02 Feb 2020 04:55)  HR: 92 (02 Feb 2020 16:58) (66 - 92)  BP: 177/92 (02 Feb 2020 16:58) (166/75 - 201/79)  BP(mean): --  RR: 18 (02 Feb 2020 16:58) (16 - 18)  SpO2: 95% (02 Feb 2020 16:58) (93% - 98%)    I&O's Summary    01 Feb 2020 07:01  -  02 Feb 2020 07:00  --------------------------------------------------------  IN: 960 mL / OUT: 600 mL / NET: 360 mL    02 Feb 2020 07:01  -  02 Feb 2020 19:34  --------------------------------------------------------  IN: 850 mL / OUT: 200 mL / NET: 650 mL        PHYSICAL EXAM:  HEENT: NC/AT; PERRLA  Neck: Soft; no tenderness  Lungs: CTA bilaterally; no wheezing.   Heart:  Abdomen:  Genital/ Rectal:  Extremities:  Neurologic:  Vascular:      LABORATORY:    CBC Full  -  ( 02 Feb 2020 06:28 )  WBC Count : 9.44 K/uL  RBC Count : 3.00 M/uL  Hemoglobin : 9.7 g/dL  Hematocrit : 29.8 %  Platelet Count - Automated : 157 K/uL  Mean Cell Volume : 99.3 fl  Mean Cell Hemoglobin : 32.3 pg  Mean Cell Hemoglobin Concentration : 32.6 gm/dL  Auto Neutrophil # : x  Auto Lymphocyte # : x  Auto Monocyte # : x  Auto Eosinophil # : x  Auto Basophil # : x  Auto Neutrophil % : x  Auto Lymphocyte % : x  Auto Monocyte % : x  Auto Eosinophil % : x  Auto Basophil % : x          02-02    144  |  108  |  46<H>  ----------------------------<  114<H>  3.9   |  28  |  1.30    Ca    8.4<L>      02 Feb 2020 06:28  Phos  3.3     02-01    TPro  6.9  /  Alb  2.1<L>  /  TBili  0.3  /  DBili  x   /  AST  27  /  ALT  19  /  AlkPhos  82  02-01      Rapid Respiratory Viral Panel Result        01-30 @ 22:12  Rapid RVP [facility]  Coronovirus --  Adenovirus --  Bordetella Pertussis --  Chlamydia Pneumonia --  Entero/Rhinovirus--  HKU1 Coronovirus --  HMPV Coronovirus --  Influenza A --  Influenza AH1 --  Influenza AH1 2009 --  Influenza AH3 --  Influenza B --  Mycoplasma Pneumoniae --  NL63 Coronovirus --  OC43 Coronovirus --  Parainfluenza 1 --  Parainfluenza 2 --  Parainfluenza 3 --  Parainfluenza 4 --  Resp Syncytial Virus --      Assessment and Plan:          Dane Barrera MD   [phone]. She is afebrile  Comfortable     MEDICATIONS  (STANDING):  albuterol/ipratropium for Nebulization 3 milliLiter(s) Nebulizer every 6 hours  buDESOnide    Inhalation Suspension 0.5 milliGRAM(s) Inhalation two times a day  diVALproex Sprinkle 125 milliGRAM(s) Oral two times a day  ertapenem  IVPB 500 milliGRAM(s) IV Intermittent every 24 hours  famotidine    Tablet 20 milliGRAM(s) Oral daily  folic acid 1 milliGRAM(s) Oral daily  heparin  Injectable 5000 Unit(s) SubCutaneous every 12 hours  lactated ringers. 1000 milliLiter(s) (80 mL/Hr) IV Continuous <Continuous>  lactobacillus acidophilus 1 Tablet(s) Oral every 8 hours  levothyroxine 100 MICROGram(s) Oral daily  metoprolol succinate ER 50 milliGRAM(s) Oral two times a day  polyethylene glycol 3350 17 Gram(s) Oral two times a day  QUEtiapine 25 milliGRAM(s) Oral two times a day  senna 2 Tablet(s) Oral at bedtime    MEDICATIONS  (PRN):  acetaminophen   Tablet .. 650 milliGRAM(s) Oral every 4 hours PRN Temp greater or equal to 38C (100.4F), Mild Pain (1 - 3)  ALPRAZolam 0.25 milliGRAM(s) Oral two times a day PRN agitation  haloperidol    Injectable 0.5 milliGRAM(s) IntraMuscular every 6 hours PRN Agitation ,delusions      Vital Signs Last 24 Hrs  T(C): 37.1 (02 Feb 2020 16:58), Max: 37.3 (02 Feb 2020 04:55)  T(F): 98.7 (02 Feb 2020 16:58), Max: 99.2 (02 Feb 2020 04:55)  HR: 92 (02 Feb 2020 16:58) (66 - 92)  BP: 177/92 (02 Feb 2020 16:58) (166/75 - 201/79)  BP(mean): --  RR: 18 (02 Feb 2020 16:58) (16 - 18)  SpO2: 95% (02 Feb 2020 16:58) (93% - 98%)    I&O's Summary    01 Feb 2020 07:01  -  02 Feb 2020 07:00  --------------------------------------------------------  IN: 960 mL / OUT: 600 mL / NET: 360 mL    02 Feb 2020 07:01  -  02 Feb 2020 19:34  --------------------------------------------------------  IN: 850 mL / OUT: 200 mL / NET: 650 mL    PHYSICAL EXAM:  HEENT: NC/AT; PERRLA  Neck: Soft; no tenderness  Lungs: CTA bilaterally; no wheezing.   Heart: RRR; no murmurs.   Abdomen: Soft; no tenderness.  Extremities: No ulcers.  Neurologic: Awake,       LABORATORY:    CBC Full  -  ( 02 Feb 2020 06:28 )  WBC Count : 9.44 K/uL  RBC Count : 3.00 M/uL  Hemoglobin : 9.7 g/dL  Hematocrit : 29.8 %  Platelet Count - Automated : 157 K/uL  Mean Cell Volume : 99.3 fl  Mean Cell Hemoglobin : 32.3 pg  Mean Cell Hemoglobin Concentration : 32.6 gm/dL  Auto Neutrophil # : x  Auto Lymphocyte # : x  Auto Monocyte # : x  Auto Eosinophil # : x  Auto Basophil # : x  Auto Neutrophil % : x  Auto Lymphocyte % : x  Auto Monocyte % : x  Auto Eosinophil % : x  Auto Basophil % : x      144  |  108  |  46<H>  ----------------------------<  114<H>  3.9   |  28  |  1.30    Ca    8.4<L>      02 Feb 2020 06:28  Phos  3.3     02-01    TPro  6.9  /  Alb  2.1<L>  /  TBili  0.3  /  DBili  x   /  AST  27  /  ALT  19  /  AlkPhos  82  02-01      Rapid Respiratory Viral Panel Result        01-30 @ 22:12  Rapid RVP [facility]  Coronovirus --  Adenovirus --  Bordetella Pertussis --  Chlamydia Pneumonia --  Entero/Rhinovirus--  HKU1 Coronovirus --  HMPV Coronovirus --  Influenza A --  Influenza AH1 --  Influenza AH1 2009 --  Influenza AH3 --  Influenza B --  Mycoplasma Pneumoniae --  NL63 Coronovirus --  OC43 Coronovirus --  Parainfluenza 1 --  Parainfluenza 2 --  Parainfluenza 3 --  Parainfluenza 4 --  Resp Syncytial Virus --      Assessment and Plan:    1. Sepsis due to UTI with ESBL E coli.   2. Bacteremia with ESBL E coli.     . Discontinue IV Rocephin. Add IV Invanz 500 mg iv daily for 14 days,   . When repeated blood cultures remains negative for 48 hrs, will get Picc line for discharge to rehab   . Discharge planning.       Dane Barrera MD   [phone].

## 2020-02-02 NOTE — PROGRESS NOTE ADULT - ASSESSMENT
DISPOSITION/ PLAN    89 f PMH dementia anxiety asthma delusional disorder HLD hytn presented 2020 with fever HENRIK lacticemi  likely sec to UTI and dehydration She was found to have E coli bacteremia ()      INFECTION E coli ESBL bacteremia Rpcephin () --> Ertapenem ()   DEHYDRATION iv fluids   HENRIK Improved   COPD On bd     Clinically improved    TIME SPENT Over 25 minutes aggregate care time spent on encounter; activities included   direct pt care, counseling and/or coordinating care reviewing notes, lab data/ imaging , discussion with multidisciplinary team/ pt /family. Risks, benefits, alternatives  discussed in detail.    YOVANI HIGGINS [facility] [id number]  1929 DOA 2020 DR DEMETRIA ELIAS

## 2020-02-02 NOTE — PROGRESS NOTE ADULT - SUBJECTIVE AND OBJECTIVE BOX
YOVANI HIGGINS [facility] [id number]  1929 DOA 2020 DR DEMETRIA ELIAS  ALLERGY asa mangos shellfish        CONTACT  Datr Syl Cole  sel                    REVIEW OF SYMPTOMS      Able to give ROS  Yes     RELIABLE No   CONSTITUTIONAL Weakness Yes  Chills No Vision changes No  ENDOCRINE No unexplained hair loss No heat or cold intolerance    ALLERGY No hives  Sore throat No   RESP Coughing blood no  Shortness of breath YES   NEURO No Headache  Confusion Pain neck No   CARDIAC No Chest pain No Palpitations   GI No Pain abdomen NO   Vomiting NO     PHYSICAL EXAM    HEENT Unremarkable PERRLA atraumatic   RESP Fair air entry EXP prolonged    Harsh breath sound Resp distres mild   CARDIAC S1 S2 No S3     NO JVD    ABDOMEN SOFT BS PRESENT NOT DISTENDED No hepatosplenomegaly PEDAL EDEMA present No calf tenderness  NO rash   GENERAL Not TOXIC looking    VITALS/LABS      2020 afeb 92 170/90   2020 W 9.4 Hb 9.7 Plt 157   Na 144 K 3.9 CO2 28 Cr 1.3     PT DATA/BEST PRACTICE  # ALLERGY shellfish mangos aspiri  ADVANCED DIRECTIVE       Goals of care discussion  WT  62 (2020)   BMI   25  (2020)                                                                                                       HEAD OF BED ELEVATION Yes  DYSPHAGIA EVAL   # DIET     Dys 2 mech soft thin (2020)     # IV F    LR 80 () <--NS 50 (2020)    BOLUS   2020 12p                                  # DVT PROPHYLAXIS        hpsc (2020)   # ABIO   Ertapenem 500 () <--                 STRESS ULCER PROPHYLAXIS              famotidine 20 (2020)   INFECTION PPLX  ECHO                CXR 2020 CXR Mild r atelectasis   GLYCEMIC CONTROL  PROCEDURE    # MICROBIO    blod culture n    blod culture e coli ESBL   RVP n   2020 Flu AB n RSV n                                                                        PATIENT DATA ASSESSMENT PLAN                                     RESP GAS EXCHANGE 2020 RA 96%  Target PO 90-95%  HEMODYNAMICS 2020 /69 Target MAP 65     E COLI BACTEREMIA ()  LIKELY SEC TO UTI   FEVER 103 POA 2020   LEUKOCYTOSIS POA 2020--2020 W 14.2 - 10  -10   E COLI BACTEREMIA POA 2020 Blod culture e coli  UTI POA 2020 UA W 25-50 R 25-50 Blod larg Nitr n L estr Mod bact tntc  LACTICEMIA POA 2020   l ----2020 LA 5.7 -4.2-2.4-2.2 -0.6    ATELECTASIS POA 2020 CXR Mild R base atelectasis    Inc rabia  COPD Duoneb ()      HENRIK POA 2020   Cr ---2020 Cr 0.7-2.5 - 1.6-1.3   a/r IV fluids Serial monitoring     HYPOALBUMINEMIA POA 2020 alb 3

## 2020-02-03 ENCOUNTER — TRANSCRIPTION ENCOUNTER (OUTPATIENT)
Age: 85
End: 2020-02-03

## 2020-02-03 LAB
ANION GAP SERPL CALC-SCNC: 5 MMOL/L — SIGNIFICANT CHANGE UP (ref 5–17)
BUN SERPL-MCNC: 42 MG/DL — HIGH (ref 7–23)
CALCIUM SERPL-MCNC: 8.7 MG/DL — SIGNIFICANT CHANGE UP (ref 8.5–10.1)
CHLORIDE SERPL-SCNC: 110 MMOL/L — HIGH (ref 96–108)
CO2 SERPL-SCNC: 31 MMOL/L — SIGNIFICANT CHANGE UP (ref 22–31)
CREAT SERPL-MCNC: 1.2 MG/DL — SIGNIFICANT CHANGE UP (ref 0.5–1.3)
GLUCOSE SERPL-MCNC: 110 MG/DL — HIGH (ref 70–99)
HCT VFR BLD CALC: 30.5 % — LOW (ref 34.5–45)
HGB BLD-MCNC: 9.7 G/DL — LOW (ref 11.5–15.5)
MCHC RBC-ENTMCNC: 31.8 GM/DL — LOW (ref 32–36)
MCHC RBC-ENTMCNC: 32 PG — SIGNIFICANT CHANGE UP (ref 27–34)
MCV RBC AUTO: 100.7 FL — HIGH (ref 80–100)
NRBC # BLD: 0 /100 WBCS — SIGNIFICANT CHANGE UP (ref 0–0)
PLATELET # BLD AUTO: 182 K/UL — SIGNIFICANT CHANGE UP (ref 150–400)
POTASSIUM SERPL-MCNC: 4.1 MMOL/L — SIGNIFICANT CHANGE UP (ref 3.5–5.3)
POTASSIUM SERPL-SCNC: 4.1 MMOL/L — SIGNIFICANT CHANGE UP (ref 3.5–5.3)
RBC # BLD: 3.03 M/UL — LOW (ref 3.8–5.2)
RBC # FLD: 15.2 % — HIGH (ref 10.3–14.5)
SODIUM SERPL-SCNC: 146 MMOL/L — HIGH (ref 135–145)
WBC # BLD: 9.05 K/UL — SIGNIFICANT CHANGE UP (ref 3.8–10.5)
WBC # FLD AUTO: 9.05 K/UL — SIGNIFICANT CHANGE UP (ref 3.8–10.5)

## 2020-02-03 RX ADMIN — QUETIAPINE FUMARATE 25 MILLIGRAM(S): 200 TABLET, FILM COATED ORAL at 17:24

## 2020-02-03 RX ADMIN — Medication 0.5 MILLIGRAM(S): at 07:20

## 2020-02-03 RX ADMIN — POLYETHYLENE GLYCOL 3350 17 GRAM(S): 17 POWDER, FOR SOLUTION ORAL at 06:12

## 2020-02-03 RX ADMIN — Medication 3 MILLILITER(S): at 07:21

## 2020-02-03 RX ADMIN — Medication 1 TABLET(S): at 06:11

## 2020-02-03 RX ADMIN — Medication 3 MILLILITER(S): at 13:29

## 2020-02-03 RX ADMIN — POLYETHYLENE GLYCOL 3350 17 GRAM(S): 17 POWDER, FOR SOLUTION ORAL at 17:24

## 2020-02-03 RX ADMIN — Medication 0.5 MILLIGRAM(S): at 19:40

## 2020-02-03 RX ADMIN — Medication 3 MILLILITER(S): at 19:40

## 2020-02-03 RX ADMIN — SENNA PLUS 2 TABLET(S): 8.6 TABLET ORAL at 21:32

## 2020-02-03 RX ADMIN — Medication 50 MILLIGRAM(S): at 06:11

## 2020-02-03 RX ADMIN — Medication 1 TABLET(S): at 21:32

## 2020-02-03 RX ADMIN — HEPARIN SODIUM 5000 UNIT(S): 5000 INJECTION INTRAVENOUS; SUBCUTANEOUS at 06:11

## 2020-02-03 RX ADMIN — ERTAPENEM SODIUM 100 MILLIGRAM(S): 1 INJECTION, POWDER, LYOPHILIZED, FOR SOLUTION INTRAMUSCULAR; INTRAVENOUS at 13:46

## 2020-02-03 RX ADMIN — DIVALPROEX SODIUM 125 MILLIGRAM(S): 500 TABLET, DELAYED RELEASE ORAL at 17:24

## 2020-02-03 RX ADMIN — Medication 1 TABLET(S): at 13:46

## 2020-02-03 RX ADMIN — Medication 50 MILLIGRAM(S): at 17:24

## 2020-02-03 RX ADMIN — Medication 1 MILLIGRAM(S): at 11:49

## 2020-02-03 RX ADMIN — Medication 100 MICROGRAM(S): at 06:11

## 2020-02-03 RX ADMIN — QUETIAPINE FUMARATE 25 MILLIGRAM(S): 200 TABLET, FILM COATED ORAL at 06:12

## 2020-02-03 RX ADMIN — FAMOTIDINE 20 MILLIGRAM(S): 10 INJECTION INTRAVENOUS at 11:49

## 2020-02-03 RX ADMIN — DIVALPROEX SODIUM 125 MILLIGRAM(S): 500 TABLET, DELAYED RELEASE ORAL at 06:11

## 2020-02-03 RX ADMIN — HEPARIN SODIUM 5000 UNIT(S): 5000 INJECTION INTRAVENOUS; SUBCUTANEOUS at 17:24

## 2020-02-03 NOTE — DISCHARGE NOTE NURSING/CASE MANAGEMENT/SOCIAL WORK - PATIENT PORTAL LINK FT
You can access the FollowMyHealth Patient Portal offered by [facility] by registering at the following website: [email or web address]. By joining [facility]’s FollowMyHealth portal, you will also be able to view your health information using other applications (apps) compatible with our system.

## 2020-02-03 NOTE — PROGRESS NOTE ADULT - SUBJECTIVE AND OBJECTIVE BOX
Date/Time Patient Seen:  		  Referring MD:   Data Reviewed	       Patient is a 89y old  Female who presents with a chief complaint of FEVER (02 Feb 2020 22:30)      Subjective/HPI     PAST MEDICAL & SURGICAL HISTORY:  Constipation  Asthma  Hyperlipidemia  Hypertension  Anxiety disorder  Delusional disorder  Hyperlipidemia  Dementia  No significant past surgical history        Medication list         MEDICATIONS  (STANDING):  albuterol/ipratropium for Nebulization 3 milliLiter(s) Nebulizer every 6 hours  buDESOnide    Inhalation Suspension 0.5 milliGRAM(s) Inhalation two times a day  diVALproex Sprinkle 125 milliGRAM(s) Oral two times a day  ertapenem  IVPB 500 milliGRAM(s) IV Intermittent every 24 hours  famotidine    Tablet 20 milliGRAM(s) Oral daily  folic acid 1 milliGRAM(s) Oral daily  heparin  Injectable 5000 Unit(s) SubCutaneous every 12 hours  lactated ringers. 1000 milliLiter(s) (80 mL/Hr) IV Continuous <Continuous>  lactobacillus acidophilus 1 Tablet(s) Oral every 8 hours  levothyroxine 100 MICROGram(s) Oral daily  metoprolol succinate ER 50 milliGRAM(s) Oral two times a day  polyethylene glycol 3350 17 Gram(s) Oral two times a day  QUEtiapine 25 milliGRAM(s) Oral two times a day  senna 2 Tablet(s) Oral at bedtime    MEDICATIONS  (PRN):  acetaminophen   Tablet .. 650 milliGRAM(s) Oral every 4 hours PRN Temp greater or equal to 38C (100.4F), Mild Pain (1 - 3)  ALPRAZolam 0.25 milliGRAM(s) Oral two times a day PRN agitation  haloperidol    Injectable 0.5 milliGRAM(s) IntraMuscular every 6 hours PRN Agitation ,delusions         Vitals log        ICU Vital Signs Last 24 Hrs  T(C): 36.6 (03 Feb 2020 00:31), Max: 37.1 (02 Feb 2020 16:58)  T(F): 97.8 (03 Feb 2020 00:31), Max: 98.7 (02 Feb 2020 16:58)  HR: 87 (03 Feb 2020 06:24) (61 - 92)  BP: 162/84 (03 Feb 2020 06:24) (158/82 - 201/79)  BP(mean): --  ABP: --  ABP(mean): --  RR: 19 (03 Feb 2020 06:24) (18 - 19)  SpO2: 100% (03 Feb 2020 06:24) (95% - 100%)           Input and Output:  I&O's Detail    02 Feb 2020 07:01  -  03 Feb 2020 07:00  --------------------------------------------------------  IN:    lactated ringers.: 1760 mL    Solution: 50 mL  Total IN: 1810 mL    OUT:    Voided: 650 mL  Total OUT: 650 mL    Total NET: 1160 mL          Lab Data                        9.7    9.05  )-----------( 182      ( 03 Feb 2020 06:34 )             30.5     02-03    146<H>  |  110<H>  |  42<H>  ----------------------------<  110<H>  4.1   |  31  |  1.20    Ca    8.7      03 Feb 2020 06:34              Review of Systems	      Objective     Physical Examination      heart s1s2  lung dec BS  abd soft  head nc    Pertinent Lab findings & Imaging      Cally:  NO   Adequate UO     I&O's Detail    02 Feb 2020 07:01  -  03 Feb 2020 07:00  --------------------------------------------------------  IN:    lactated ringers.: 1760 mL    Solution: 50 mL  Total IN: 1810 mL    OUT:    Voided: 650 mL  Total OUT: 650 mL    Total NET: 1160 mL               Discussed with:     Cultures:	        Radiology

## 2020-02-03 NOTE — PROGRESS NOTE ADULT - SUBJECTIVE AND OBJECTIVE BOX
YOVANI HIGGINS [facility] [phone]  1929 DOA 2020 DR DEMETRIA ELIAS  ALLERGY asa mangos shellfish        CONTACT  Datr Syl Cole  [facility]                    REVIEW OF SYMPTOMS      Able to give ROS  Yes     RELIABLE No   CONSTITUTIONAL Weakness Yes  Chills No Vision changes No  ENDOCRINE No unexplained hair loss No heat or cold intolerance    ALLERGY No hives  Sore throat No   RESP Coughing blood no  Shortness of breath YES   NEURO No Headache  Confusion Pain neck No   CARDIAC No Chest pain No Palpitations   GI No Pain abdomen NO   Vomiting NO     PHYSICAL EXAM    HEENT Unremarkable PERRLA atraumatic   RESP Fair air entry EXP prolonged    Harsh breath sound Resp distres mild   CARDIAC S1 S2 No S3     NO JVD    ABDOMEN SOFT BS PRESENT NOT DISTENDED No hepatosplenomegaly PEDAL EDEMA present No calf tenderness  NO rash   GENERAL Not TOXIC looking    VITALS/LABS    2/3/2020 afeb 92 170/80   2/3/2020 W 9 Hb 9.7 Plt 182 Na 146 K 4.1 CO2 31 Cr 1.2     2020 afeb 92 170/90     PT DATA/BEST PRACTICE  # ALLERGY shellfish mangos aspiri  ADVANCED DIRECTIVE       Goals of care discussion  WT  62 (2020)   BMI   25  (2020)                                                                                                       HEAD OF BED ELEVATION Yes  DYSPHAGIA EVAL   # DIET     Dys 2 mech soft thin (2020)     # IV F    LR 80 () <--NS 50 (2020)    BOLUS   2020 12p                                  # DVT PROPHYLAXIS        hpsc (2020)   # ABIO   Ertapenem 500 () <--  Rocephin  ()   azithro (-)                  STRESS ULCER PROPHYLAXIS              famotidine 20 (2020)   INFECTION PPLX  ECHO                CXR 2020 CXR Mild r atelectasis   GLYCEMIC CONTROL  PROCEDURE    # MICROBIO    blod culture n    blod culture e coli ESBL   RVP n   2020 Flu AB n RSV n

## 2020-02-03 NOTE — PROGRESS NOTE ADULT - SUBJECTIVE AND OBJECTIVE BOX
Patient is a 89y Female whom presented to the hospital with ckd and josue   pateint seen and examined nad , no fever , no chills    PAST MEDICAL & SURGICAL HISTORY:  Constipation  Asthma  Hyperlipidemia  Hypertension  Anxiety disorder  Delusional disorder  Dementia  No significant past surgical history      MEDICATIONS  (STANDING):  albuterol/ipratropium for Nebulization 3 milliLiter(s) Nebulizer every 6 hours  buDESOnide    Inhalation Suspension 0.5 milliGRAM(s) Inhalation two times a day  cefTRIAXone   IVPB 1000 milliGRAM(s) IV Intermittent every 24 hours  diVALproex Sprinkle 125 milliGRAM(s) Oral two times a day  famotidine    Tablet 20 milliGRAM(s) Oral daily  folic acid 1 milliGRAM(s) Oral daily  heparin  Injectable 5000 Unit(s) SubCutaneous every 12 hours  lactated ringers. 1000 milliLiter(s) (80 mL/Hr) IV Continuous <Continuous>  lactobacillus acidophilus 1 Tablet(s) Oral every 8 hours  levothyroxine 100 MICROGram(s) Oral daily  metoprolol succinate ER 50 milliGRAM(s) Oral two times a day  polyethylene glycol 3350 17 Gram(s) Oral two times a day  QUEtiapine 25 milliGRAM(s) Oral two times a day  senna 2 Tablet(s) Oral at bedtime      Allergies    aspirin (Anaphylaxis)  aspirin (Unknown)  mangoes (Unknown)  mangos (Unknown)  shellfish (Unknown)                          9.7    9.05  )-----------( 182      ( 03 Feb 2020 06:34 )             30.5       CBC Full  -  ( 03 Feb 2020 06:34 )  WBC Count : 9.05 K/uL  RBC Count : 3.03 M/uL  Hemoglobin : 9.7 g/dL  Hematocrit : 30.5 %  Platelet Count - Automated : 182 K/uL  Mean Cell Volume : 100.7 fl  Mean Cell Hemoglobin : 32.0 pg  Mean Cell Hemoglobin Concentration : 31.8 gm/dL  Auto Neutrophil # : x  Auto Lymphocyte # : x  Auto Monocyte # : x  Auto Eosinophil # : x  Auto Basophil # : x  Auto Neutrophil % : x  Auto Lymphocyte % : x  Auto Monocyte % : x  Auto Eosinophil % : x  Auto Basophil % : x      02-03    146<H>  |  110<H>  |  42<H>  ----------------------------<  110<H>  4.1   |  31  |  1.20    Ca    8.7      03 Feb 2020 06:34        CAPILLARY BLOOD GLUCOSE          Vital Signs Last 24 Hrs  T(C): 36.6 (03 Feb 2020 07:58), Max: 37.1 (02 Feb 2020 16:58)  T(F): 97.8 (03 Feb 2020 07:58), Max: 98.7 (02 Feb 2020 16:58)  HR: 92 (03 Feb 2020 07:58) (61 - 92)  BP: 143/78 (03 Feb 2020 07:58) (143/78 - 177/92)  BP(mean): --  RR: 17 (03 Feb 2020 07:58) (17 - 19)  SpO2: 96% (03 Feb 2020 07:58) (95% - 100%)        Intolerances        SOCIAL HISTORY:  Denies ETOh,Smoking,     FAMILY HISTORY:  No pertinent family history in first degree relatives      REVIEW OF SYSTEMS:    unable to obtained a good review system                                                                                 PHYSICAL EXAM:    Constitutional: NAD  HEENT: conjunctive   clear   Neck:  No JVD  Respiratory: decreases bs b/l   Cardiovascular: S1 and S2  Gastrointestinal: BS+, soft, NT/ND  Extremities: No peripheral edema  Neurological:  no focal deficits  Psychiatric: Normal mood, normal affect  Skin: dry   Access: Not applicable

## 2020-02-03 NOTE — PROGRESS NOTE ADULT - SUBJECTIVE AND OBJECTIVE BOX
PROGRESS NOTE  Patient is a 89y old  Female who presents with a chief complaint of FEVER (03 Feb 2020 19:18)  Chart and available morning labs /imaging are reviewed electronically , urgent issues addressed . More information  is being added upon completion of rounds , when more information is collected and management discussed with consultants , medical staff and social service/case management on the floor   LATE ENTRY- patient was seen and examined earlier today . Plan of care was discussed with med staff and unit coordinator .   OVERNIGHT  No new issues reported by medical staff . All above noted Patient is resting in a bed comfortably .Confused ,poor mentation .No distress noted   PICC line ordered but HCP refused it and would like to refuse iv abx ,pall care cons input requested   HPI:  89 yo WF , [facility] resident with medical hx of  Anxiety disorder  ,Asthma  ,Constipation  ,Delusional disorder  ,Dementia  ,Hyperlipidemia    Hypertension. bib ems for fever. No further hx available due to dementia  .Urosepsis vs aspiration pneumonia suspected Admitted for septic workup and evaluation ,send blood and urine cx,serial lactate levels ,monitor vitals closely hydration ,monitor urine output and renal profile ,iv abx initiated. Pulmonology evaluation obtained Patient found to have lactate elevation and septic workup was sent Palliative care consult requested ,to discuss advance directives and complete MOLST (30 Jan 2020 12:43)  PAST MEDICAL & SURGICAL HISTORY:  Constipation  Asthma  Hyperlipidemia  Hypertension  Anxiety disorder  Delusional disorder  Dementia  No significant past surgical history  MEDICATIONS  (STANDING):  albuterol/ipratropium for Nebulization 3 milliLiter(s) Nebulizer every 6 hours  buDESOnide    Inhalation Suspension 0.5 milliGRAM(s) Inhalation two times a day  diVALproex Sprinkle 125 milliGRAM(s) Oral two times a day  ertapenem  IVPB 500 milliGRAM(s) IV Intermittent every 24 hours  famotidine    Tablet 20 milliGRAM(s) Oral daily  folic acid 1 milliGRAM(s) Oral daily  heparin  Injectable 5000 Unit(s) SubCutaneous every 12 hours  lactated ringers. 1000 milliLiter(s) (80 mL/Hr) IV Continuous <Continuous>  lactobacillus acidophilus 1 Tablet(s) Oral every 8 hours  levothyroxine 100 MICROGram(s) Oral daily  metoprolol succinate ER 50 milliGRAM(s) Oral two times a day  polyethylene glycol 3350 17 Gram(s) Oral two times a day  QUEtiapine 25 milliGRAM(s) Oral two times a day  senna 2 Tablet(s) Oral at bedtime  MEDICATIONS  (PRN):  acetaminophen   Tablet .. 650 milliGRAM(s) Oral every 4 hours PRN Temp greater or equal to 38C (100.4F), Mild Pain (1 - 3)  ALPRAZolam 0.25 milliGRAM(s) Oral two times a day PRN agitation  haloperidol    Injectable 0.5 milliGRAM(s) IntraMuscular every 6 hours PRN Agitation ,delusions  OBJECTIVE  T(C): 37.1 (02-03-20 @ 16:20), Max: 37.1 (02-03-20 @ 16:20)  HR: 72 (02-03-20 @ 19:41) (61 - 92)  BP: 180/90 (02-03-20 @ 16:20) (143/78 - 180/90)  RR: 18 (02-03-20 @ 16:20) (17 - 19)  SpO2: 87% (02-03-20 @ 19:41) (87% - 100%)  Wt(kg): --  I&O's Summary    02 Feb 2020 07:01  -  03 Feb 2020 07:00  --------------------------------------------------------  IN: 1810 mL / OUT: 650 mL / NET: 1160 mL    REVIEW OF SYSTEMS:  ROS is unobtainable due to dementia and confusion   PHYSICAL EXAM:  Appearance: NAD. VS past 24 hrs -as above   HEENT:   Moist oral mucosa. Conjunctiva clear b/l.   Neck : supple  Respiratory: Lungs CTAB.  Gastrointestinal:  Soft, nontender. No rebound. No rigidity. BS present	  Cardiovascular: RRR ,S1S2 present  Neurologic: Non-focal. Moving all extremities.  Extremities: No edema. No erythema. No calf tenderness.  Skin: No rashes, No ecchymoses, No cyanosis.	  wounds ,skin lesions-See skin assesment flow sheet   LABS:                        9.7    9.05  )-----------( 182      ( 03 Feb 2020 06:34 )             30.5     02-03    146<H>  |  110<H>  |  42<H>  ----------------------------<  110<H>  4.1   |  31  |  1.20    Ca    8.7      03 Feb 2020 06:34      CAPILLARY BLOOD GLUCOSE              Culture - Blood (collected 02 Feb 2020 11:31)  Source: .Blood Blood  Preliminary Report (03 Feb 2020 12:01):    No growth to date.    Culture - Blood (collected 02 Feb 2020 11:31)  Source: .Blood Blood  Preliminary Report (03 Feb 2020 12:01):    No growth to date.    Culture - Blood (collected 01 Feb 2020 12:05)  Source: .Blood Blood  Preliminary Report (02 Feb 2020 13:01):    No growth to date.    Culture - Blood (collected 30 Jan 2020 16:55)  Source: .Blood Blood-Peripheral  Gram Stain (31 Jan 2020 05:17):    Growth in aerobic bottle:    Gram Negative Rods    Growth in anaerobic bottle:    Gram Negative Rods  Final Report (01 Feb 2020 16:34):    Growth in aerobic and anaerobic bottles: Escherichia coli ESBL    ***Blood Panel PCR results on this specimen are available    approximately 3 hours after the Gram stain result.***    Gram stain, PCR, and/or culture results may not always    correspond dueto difference in methodologies.    ************************************************************    This PCR assay was performed using [employer].    The following targets are tested for: Enterococcus,    vancomycin resistant enterococci, Listeria monocytogenes,    coagulase negative staphylococci, S. aureus,    methicillin resistant S. aureus, Streptococcus agalactiae    (Group B), S. pneumoniae, S. pyogenes (Group A),    Acinetobacter baumannii, Enterobacter cloacae, E. coli,    Klebsiella oxytoca, K. pneumoniae, Proteus sp.,    Serratia marcescens, Haemophilus influenzae,    Neisseria meningitidis, Pseudomonas aeruginosa, Candida    albicans, C. glabrata, C krusei, C parapsilosis,    C. tropicalis and the KPC resistance gene.  Organism: Blood Culture PCR  Escherichia coli ESBL (01 Feb 2020 16:34)  Organism: Escherichia coli ESBL (01 Feb 2020 16:34)  Organism: Blood Culture PCR (01 Feb 2020 16:34)    Culture - Blood (collected 30 Jan 2020 16:42)  Source: .Blood Blood-Peripheral  Gram Stain (31 Jan 2020 04:28):    Growth in aerobic bottle:    Gram Negative Rods    Growth in anaerobic bottle:    Gram Negative Rods  Final Report (01 Feb 2020 16:54):    Growth in aerobic and anaerobic bottles: Escherichia coli ESBL    See previous culture [id number]    Culture - Urine (collected 30 Jan 2020 16:36)  Source: .Urine Catheterized  Final Report (01 Feb 2020 19:45):    >100,000 CFU/ml Escherichia coli ESBL  Organism: Escherichia coli ESBL (01 Feb 2020 19:45)  Organism: Escherichia coli ESBL (01 Feb 2020 19:45)      RADIOLOGY & ADDITIONAL TESTS:< from: Xray Chest 1 View AP/PA (01.30.20 @ 12:16) >  EXAM:  XR CHEST AP OR PA 1V                            PROCEDURE DATE:  01/30/2020          INTERPRETATION:  Chest one view    HISTORY: Fever    COMPARISON STUDY: 1/16/2020    Frontal expiratory view of the chest shows the heart to be similar in size. The lungs show mild right base atelectasis and there is no evidence of pneumothorax nor pleural effusion.    IMPRESSION:  Mild right atelectasis.    Thank you for the courtesy of this referral.    < end of copied text >     reviewed elctronically  ASSESSMENT/PLAN: 	  45minutes spent on this visit, 50% visit time spent in care co-ordination with other attendings and counselling patient  I have discussed care plan with patient and HCP,expressed understanding of problems treatment and their effect and side effects, alternatives in detail,I have asked if they have any questions and concerns and appropriately addressed them to best of my ability

## 2020-02-03 NOTE — PROGRESS NOTE ADULT - ASSESSMENT
DISPOSITION/ PLAN    89 f PMH dementia anxiety asthma delusional disorder HLD hytn presented 2020 with fever HENRIK lacticemi  likely sec to UTI and dehydration She was found to have E coli bacteremia ()      INFECTION E coli ESBL bacteremia Rpcephin () --> Ertapenem ()   DEHYDRATION iv fluids   HENRIK Improved   COPD On bd     Clinically improved      TIME SPENT Over 25 minutes aggregate care time spent on encounter; activities included   direct pt care, counseling and/or coordinating care reviewing notes, lab data/ imaging , discussion with multidisciplinary team/ pt /family. Risks, benefits, alternatives  discussed in detail.    YOVANI HIGIGNS [facility] [phone]  1929 DOA 2020 [address] DEMETRIA ELIAS

## 2020-02-04 ENCOUNTER — TRANSCRIPTION ENCOUNTER (OUTPATIENT)
Age: 85
End: 2020-02-04

## 2020-02-04 LAB
ANION GAP SERPL CALC-SCNC: 5 MMOL/L — SIGNIFICANT CHANGE UP (ref 5–17)
BUN SERPL-MCNC: 36 MG/DL — HIGH (ref 7–23)
CALCIUM SERPL-MCNC: 8.5 MG/DL — SIGNIFICANT CHANGE UP (ref 8.5–10.1)
CHLORIDE SERPL-SCNC: 111 MMOL/L — HIGH (ref 96–108)
CO2 SERPL-SCNC: 31 MMOL/L — SIGNIFICANT CHANGE UP (ref 22–31)
CREAT SERPL-MCNC: 1 MG/DL — SIGNIFICANT CHANGE UP (ref 0.5–1.3)
GLUCOSE SERPL-MCNC: 120 MG/DL — HIGH (ref 70–99)
HCT VFR BLD CALC: 29.7 % — LOW (ref 34.5–45)
HGB BLD-MCNC: 9.4 G/DL — LOW (ref 11.5–15.5)
MCHC RBC-ENTMCNC: 31.6 GM/DL — LOW (ref 32–36)
MCHC RBC-ENTMCNC: 32.2 PG — SIGNIFICANT CHANGE UP (ref 27–34)
MCV RBC AUTO: 101.7 FL — HIGH (ref 80–100)
NRBC # BLD: 0 /100 WBCS — SIGNIFICANT CHANGE UP (ref 0–0)
PLATELET # BLD AUTO: 215 K/UL — SIGNIFICANT CHANGE UP (ref 150–400)
POTASSIUM SERPL-MCNC: 4 MMOL/L — SIGNIFICANT CHANGE UP (ref 3.5–5.3)
POTASSIUM SERPL-SCNC: 4 MMOL/L — SIGNIFICANT CHANGE UP (ref 3.5–5.3)
RBC # BLD: 2.92 M/UL — LOW (ref 3.8–5.2)
RBC # FLD: 15.3 % — HIGH (ref 10.3–14.5)
SODIUM SERPL-SCNC: 147 MMOL/L — HIGH (ref 135–145)
WBC # BLD: 9.09 K/UL — SIGNIFICANT CHANGE UP (ref 3.8–10.5)
WBC # FLD AUTO: 9.09 K/UL — SIGNIFICANT CHANGE UP (ref 3.8–10.5)

## 2020-02-04 RX ORDER — DIVALPROEX SODIUM 500 MG/1
1 TABLET, DELAYED RELEASE ORAL
Qty: 0 | Refills: 0 | DISCHARGE

## 2020-02-04 RX ORDER — FOLIC ACID 0.8 MG
1 TABLET ORAL
Qty: 0 | Refills: 0 | DISCHARGE

## 2020-02-04 RX ORDER — SENNA PLUS 8.6 MG/1
2 TABLET ORAL
Qty: 60 | Refills: 0
Start: 2020-02-04 | End: 2020-03-04

## 2020-02-04 RX ORDER — ALPRAZOLAM 0.25 MG
1 TABLET ORAL
Qty: 0 | Refills: 0 | DISCHARGE

## 2020-02-04 RX ORDER — POLYETHYLENE GLYCOL 3350 17 G/17G
17 POWDER, FOR SOLUTION ORAL
Qty: 340 | Refills: 0
Start: 2020-02-04 | End: 2020-02-23

## 2020-02-04 RX ORDER — QUETIAPINE FUMARATE 200 MG/1
1 TABLET, FILM COATED ORAL
Qty: 0 | Refills: 0 | DISCHARGE

## 2020-02-04 RX ORDER — DOCUSATE SODIUM 100 MG
1 CAPSULE ORAL
Qty: 0 | Refills: 0 | DISCHARGE

## 2020-02-04 RX ORDER — MORPHINE SULFATE 50 MG/1
0.25 CAPSULE, EXTENDED RELEASE ORAL
Qty: 30 | Refills: 0
Start: 2020-02-04 | End: 2020-03-04

## 2020-02-04 RX ORDER — METOPROLOL TARTRATE 50 MG
1 TABLET ORAL
Qty: 0 | Refills: 0 | DISCHARGE

## 2020-02-04 RX ORDER — SENNA PLUS 8.6 MG/1
2 TABLET ORAL
Qty: 0 | Refills: 0 | DISCHARGE

## 2020-02-04 RX ORDER — LEVOTHYROXINE SODIUM 125 MCG
1 TABLET ORAL
Qty: 0 | Refills: 0 | DISCHARGE

## 2020-02-04 RX ORDER — METOPROLOL TARTRATE 50 MG
50 TABLET ORAL
Refills: 0 | Status: DISCONTINUED | OUTPATIENT
Start: 2020-02-04 | End: 2020-02-08

## 2020-02-04 RX ORDER — POTASSIUM CHLORIDE 20 MEQ
1 PACKET (EA) ORAL
Qty: 0 | Refills: 0 | DISCHARGE

## 2020-02-04 RX ORDER — QUETIAPINE FUMARATE 200 MG/1
1 TABLET, FILM COATED ORAL
Qty: 60 | Refills: 0
Start: 2020-02-04 | End: 2020-03-04

## 2020-02-04 RX ORDER — METOPROLOL TARTRATE 50 MG
1 TABLET ORAL
Qty: 60 | Refills: 0
Start: 2020-02-04 | End: 2020-03-04

## 2020-02-04 RX ORDER — ALPRAZOLAM 0.25 MG
1 TABLET ORAL
Qty: 60 | Refills: 0
Start: 2020-02-04 | End: 2020-03-04

## 2020-02-04 RX ORDER — LOSARTAN POTASSIUM 100 MG/1
1 TABLET, FILM COATED ORAL
Qty: 0 | Refills: 0 | DISCHARGE

## 2020-02-04 RX ORDER — MORPHINE SULFATE 50 MG/1
5 CAPSULE, EXTENDED RELEASE ORAL
Refills: 0 | Status: DISCONTINUED | OUTPATIENT
Start: 2020-02-04 | End: 2020-02-08

## 2020-02-04 RX ORDER — FUROSEMIDE 40 MG
1 TABLET ORAL
Qty: 0 | Refills: 0 | DISCHARGE

## 2020-02-04 RX ADMIN — Medication 50 MILLIGRAM(S): at 05:22

## 2020-02-04 RX ADMIN — DIVALPROEX SODIUM 125 MILLIGRAM(S): 500 TABLET, DELAYED RELEASE ORAL at 05:22

## 2020-02-04 RX ADMIN — Medication 50 MILLIGRAM(S): at 18:00

## 2020-02-04 RX ADMIN — SENNA PLUS 2 TABLET(S): 8.6 TABLET ORAL at 22:13

## 2020-02-04 RX ADMIN — Medication 0.5 MILLIGRAM(S): at 08:13

## 2020-02-04 RX ADMIN — Medication 1 TABLET(S): at 05:22

## 2020-02-04 RX ADMIN — Medication 1 MILLIGRAM(S): at 11:13

## 2020-02-04 RX ADMIN — HEPARIN SODIUM 5000 UNIT(S): 5000 INJECTION INTRAVENOUS; SUBCUTANEOUS at 05:22

## 2020-02-04 RX ADMIN — Medication 3 MILLILITER(S): at 01:27

## 2020-02-04 RX ADMIN — Medication 3 MILLILITER(S): at 08:13

## 2020-02-04 RX ADMIN — FAMOTIDINE 20 MILLIGRAM(S): 10 INJECTION INTRAVENOUS at 11:13

## 2020-02-04 RX ADMIN — Medication 100 MICROGRAM(S): at 05:22

## 2020-02-04 RX ADMIN — QUETIAPINE FUMARATE 25 MILLIGRAM(S): 200 TABLET, FILM COATED ORAL at 05:22

## 2020-02-04 RX ADMIN — QUETIAPINE FUMARATE 25 MILLIGRAM(S): 200 TABLET, FILM COATED ORAL at 18:00

## 2020-02-04 NOTE — PROGRESS NOTE ADULT - SUBJECTIVE AND OBJECTIVE BOX
YOVANI HIGGINS [facility] [phone]  1929 [address]A 2020 [address] DEMETRIA ELIAS  ALLERGY asa mangos shellfish        CONTACT  Datr Syl Cole  sel               REVIEW OF SYMPTOMS      Able to give ROS  NO     PHYSICAL EXAM    HEENT Unremarkable PERRLA atraumatic   RESP Fair air entry EXP prolonged    Harsh breath sound Resp distres mild   CARDIAC S1 S2 No S3     NO JVD    ABDOMEN SOFT BS PRESENT NOT DISTENDED No hepatosplenomegaly PEDAL EDEMA present No calf tenderness  NO rash   GENERAL Not TOXIC looking    VITALS/LABS    2020 afeb 74 190/95 20 95%  2020 W 9 Hb 9.4 Plt 215Na 147 K 4 CO2 31 Cr 1

## 2020-02-04 NOTE — PROGRESS NOTE ADULT - SUBJECTIVE AND OBJECTIVE BOX
Chart and available morning labs /imaging are reviewed electronically , urgent issues addressed . More information  is being added upon completion of rounds , when more information is collected and management discussed with consultants , medical staff and social service/case management on the floor LATE ENTRY- patient was seen and examined earlier today . Plan of care was discussed with med staff and unit coordinator .   Patient is a 89y Female whom presented to the hospital with ckd and josue   pateint seen and examined nad , no fever , no chills  No new issues reported by medical staff . All above noted Patient is resting in a bed comfortably .Confused ,poor mentation .No distress noted   PAST MEDICAL & SURGICAL HISTORY:  Constipation  Asthma  Hyperlipidemia  Hypertension  Anxiety disorder  Delusional disorder  Dementia  No significant past surgical history  Daughter keeps refusing abx and requested CMO to be initatted ,i asked PCP  to tallk to the daughter as well ,he confirmed that she would like to stop agressive tx and stop abx ,continue CMO HOSPICE EVALUATION UPON RETURN TO [facility]    MEDICATIONS  (STANDING):  albuterol/ipratropium for Nebulization 3 milliLiter(s) Nebulizer every 6 hours  buDESOnide    Inhalation Suspension 0.5 milliGRAM(s) Inhalation two times a day  cefTRIAXone   IVPB 1000 milliGRAM(s) IV Intermittent every 24 hours  diVALproex Sprinkle 125 milliGRAM(s) Oral two times a day  famotidine    Tablet 20 milliGRAM(s) Oral daily  folic acid 1 milliGRAM(s) Oral daily  heparin  Injectable 5000 Unit(s) SubCutaneous every 12 hours  lactated ringers. 1000 milliLiter(s) (80 mL/Hr) IV Continuous <Continuous>  lactobacillus acidophilus 1 Tablet(s) Oral every 8 hours  levothyroxine 100 MICROGram(s) Oral daily  metoprolol succinate ER 50 milliGRAM(s) Oral two times a day  polyethylene glycol 3350 17 Gram(s) Oral two times a day  QUEtiapine 25 milliGRAM(s) Oral two times a day  senna 2 Tablet(s) Oral at bedtime      Allergies    aspirin (Anaphylaxis)  aspirin (Unknown)  mangoes (Unknown)  mangos (Unknown)  shellfish (Unknown)                          9.7    9.05  )-----------( 182      ( 03 Feb 2020 06:34 )             30.5       CBC Full  -  ( 03 Feb 2020 06:34 )  WBC Count : 9.05 K/uL  RBC Count : 3.03 M/uL  Hemoglobin : 9.7 g/dL  Hematocrit : 30.5 %  Platelet Count - Automated : 182 K/uL  Mean Cell Volume : 100.7 fl  Mean Cell Hemoglobin : 32.0 pg  Mean Cell Hemoglobin Concentration : 31.8 gm/dL  Auto Neutrophil # : x  Auto Lymphocyte # : x  Auto Monocyte # : x  Auto Eosinophil # : x  Auto Basophil # : x  Auto Neutrophil % : x  Auto Lymphocyte % : x  Auto Monocyte % : x  Auto Eosinophil % : x  Auto Basophil % : x                          9.4    9.09  )-----------( 215      ( 04 Feb 2020 06:30 )             29.7       CBC Full  -  ( 04 Feb 2020 06:30 )  WBC Count : 9.09 K/uL  RBC Count : 2.92 M/uL  Hemoglobin : 9.4 g/dL  Hematocrit : 29.7 %  Platelet Count - Automated : 215 K/uL  Mean Cell Volume : 101.7 fl  Mean Cell Hemoglobin : 32.2 pg  Mean Cell Hemoglobin Concentration : 31.6 gm/dL  Auto Neutrophil # : x  Auto Lymphocyte # : x  Auto Monocyte # : x  Auto Eosinophil # : x  Auto Basophil # : x  Auto Neutrophil % : x  Auto Lymphocyte % : x  Auto Monocyte % : x  Auto Eosinophil % : x  Auto Basophil % : x      02-04    147<H>  |  111<H>  |  36<H>  ----------------------------<  120<H>  4.0   |  31  |  1.00    Ca    8.5      04 Feb 2020 06:30        CAPILLARY BLOOD GLUCOSE          Vital Signs Last 24 Hrs  T(C): 37.1 (04 Feb 2020 15:52), Max: 37.1 (03 Feb 2020 16:20)  T(F): 98.7 (04 Feb 2020 15:52), Max: 98.8 (04 Feb 2020 08:11)  HR: 74 (04 Feb 2020 15:52) (65 - 100)  BP: 190/87 (04 Feb 2020 08:11) (180/90 - 191/81)  BP(mean): --  RR: 20 (04 Feb 2020 15:52) (18 - 20)  SpO2: 90% (04 Feb 2020 15:52) (87% - 98%)          Intolerances        SOCIAL HISTORY:  Denies ETOh,Smoking,     FAMILY HISTORY:  No pertinent family history in first degree relatives      REVIEW OF SYSTEMS:    unable to obtained a good review system                                                                                 PHYSICAL EXAM:    Constitutional: NAD  HEENT: conjunctive   clear   Neck:  No JVD  Respiratory: decreases bs b/l   Cardiovascular: S1 and S2  Gastrointestinal: BS+, soft, NT/ND  Extremities: No peripheral edema  Neurological:  no focal deficits  Psychiatric: Normal mood, normal affect  Skin: dry   Access: Not applicable    45minutes spent on this visit, 50% visit time spent in care co-ordination with other attendings and counselling patient  I have discussed care plan with patient and HCP,expressed understanding of problems treatment and their effect and side effects, alternatives in detail,I have asked if they have any questions and concerns and appropriately addressed them to best of my ability

## 2020-02-04 NOTE — CHART NOTE - NSCHARTNOTEFT_GEN_A_CORE
spoke with dtr  she wishes for comfort measures only  will order CMO with Rx regimen  dc plan for nursing home with comfort only

## 2020-02-04 NOTE — DISCHARGE NOTE PROVIDER - NSDCCPCAREPLAN_GEN_ALL_CORE_FT
PRINCIPAL DISCHARGE DIAGNOSIS  Diagnosis: Sepsis  Assessment and Plan of Treatment: with ESBL BACTEREMIA ,POA - FAMILY REFUSED TX AND REQUESTED CMO      SECONDARY DISCHARGE DIAGNOSES  Diagnosis: UTI (urinary tract infection)  Assessment and Plan of Treatment:     Diagnosis: Dementia  Assessment and Plan of Treatment: Dementia    Diagnosis: Anxiety disorder  Assessment and Plan of Treatment: Anxiety disorder    Diagnosis: Delusional disorder  Assessment and Plan of Treatment: Delusional disorder    Diagnosis: Palliative care encounter  Assessment and Plan of Treatment: Palliative care encounter

## 2020-02-04 NOTE — DISCHARGE NOTE PROVIDER - NSDCMRMEDTOKEN_GEN_ALL_CORE_FT
ALPRAZolam 0.25 mg oral tablet: 1 tab(s) orally 2 times a day, As needed, agitation MDD:2 tabs  morphine 20 mg/mL oral concentrate: 0.25 milliliter(s) orally 4 times a day, As Needed -dyspnea, pain, distress, suffering MDD:mdd 1 ml  polyethylene glycol 3350 oral powder for reconstitution: 17 gram(s) orally 2 times a day  QUEtiapine 25 mg oral tablet: 1 tab(s) orally 2 times a day  senna oral tablet: 2 tab(s) orally once a day (at bedtime) ALPRAZolam 0.25 mg oral tablet: 1 tab(s) orally 2 times a day, As needed, agitation MDD:2 tabs  metoprolol succinate 50 mg oral tablet, extended release: 1 tab(s) orally 2 times a day  morphine 20 mg/mL oral concentrate: 0.25 milliliter(s) orally 4 times a day, As Needed -dyspnea, pain, distress, suffering MDD:mdd 1 ml  polyethylene glycol 3350 oral powder for reconstitution: 17 gram(s) orally 2 times a day  QUEtiapine 25 mg oral tablet: 1 tab(s) orally 2 times a day  senna oral tablet: 2 tab(s) orally once a day (at bedtime) ALPRAZolam 0.25 mg oral tablet: 1 tab(s) orally 2 times a day, As needed, agitation MDD:2 tabs  amLODIPine 5 mg oral tablet: 1 tab(s) orally once a day  metoprolol succinate 50 mg oral tablet, extended release: 1 tab(s) orally 2 times a day  morphine 20 mg/mL oral concentrate: 0.25 milliliter(s) orally 4 times a day, As Needed -dyspnea, pain, distress, suffering MDD:mdd 1 ml  polyethylene glycol 3350 oral powder for reconstitution: 17 gram(s) orally 2 times a day  QUEtiapine 25 mg oral tablet: 1 tab(s) orally 2 times a day  senna oral tablet: 2 tab(s) orally once a day (at bedtime) ALPRAZolam 0.25 mg oral tablet: 1 tab(s) orally 2 times a day, As needed, agitation MDD:2 tabs  amLODIPine 5 mg oral tablet: 1 tab(s) orally once a day  cloNIDine 0.1 mg/24 hr transdermal film, extended release: 1 patch transdermal every 7 days  metoprolol succinate 50 mg oral tablet, extended release: 1 tab(s) orally 2 times a day  morphine 20 mg/mL oral concentrate: 0.25 milliliter(s) orally 4 times a day, As Needed -dyspnea, pain, distress, suffering MDD:mdd 1 ml  polyethylene glycol 3350 oral powder for reconstitution: 17 gram(s) orally 2 times a day  QUEtiapine 25 mg oral tablet: 1 tab(s) orally 2 times a day  senna oral tablet: 2 tab(s) orally once a day (at bedtime) ALPRAZolam 0.25 mg oral tablet: 1 tab(s) orally 2 times a day, As needed, agitation MDD:2 tabs  amLODIPine 5 mg oral tablet: 1 tab(s) orally once a day  cloNIDine 0.1 mg/24 hr transdermal film, extended release: 1 patch transdermal every 7 days  hydrALAZINE 100 mg oral tablet: 1 tab(s) orally every 8 hours  levothyroxine 100 mcg (0.1 mg) oral tablet: 1 tab(s) orally once a day   metoprolol succinate 50 mg oral tablet, extended release: 1 tab(s) orally 2 times a day  morphine 20 mg/mL oral concentrate: 0.25 milliliter(s) orally 4 times a day, As Needed -dyspnea, pain, distress, suffering MDD:mdd 1 ml  polyethylene glycol 3350 oral powder for reconstitution: 17 gram(s) orally 2 times a day  QUEtiapine 25 mg oral tablet: 1 tab(s) orally 2 times a day  senna oral tablet: 2 tab(s) orally once a day (at bedtime)  Tylenol 325 mg oral tablet: 2 tab(s) orally every 4 hours, As Needed mild pain or temp above 100.4

## 2020-02-04 NOTE — DISCHARGE NOTE PROVIDER - HOSPITAL COURSE
89 yo  , [facility] resident with medical hx of  Anxiety disorder  ,Asthma  ,Constipation  ,Delusional disorder  ,Dementia  ,Hyperlipidemia      Hypertension. bib ems for fever. No further hx available due to dementia  .Urosepsis vs aspiration pneumonia suspected Admitted for septic workup and evaluation ,send blood and urine cx,serial lactate levels ,monitor vitals closely hydration ,monitor urine output and renal profile ,iv abx initiated. Pulmonology evaluation obtained Patient found to have lactate elevation and septic workup was sent Palliative care consult requested ,to discuss advance directives and complete MOLST     ·  Problem: Acute febrile illness.  Plan: RULED IN FOR SEPSIS 2/2 TO UTI WITH BACTEREMIA -SEEN BY ID ,14 days of INVANZ recommended ,but daughter /HCP refused PICC placeemnt and iv abx She requests home hospice and pall care Palliative care consult requested ,to discuss advance directives and complete MOLST /initiate CMO.     ·  Problem: UTI (urinary tract infection).  Plan: RULED IN FOR SEPSIS 2/2 TO UTI WITH BACTEREMIA WITH ESBL E.COLI  -SEEN BY ID ,14 days of INVANZ recommended ,but daughter /HCP refused PICC placeemnt and iv abx She requests home hospice and pall care Palliative care consult requested ,to discuss advance directives and complete MOLST /initiate CMO.     ·  Problem: Sepsis.  Plan: Admitted for septic workup and evaluation,send blood and urine cx,serial lactate levels,monitor vitals closley,ivfs hydration,monitor urine output and renal     ·  Problem: HENRIK (acute kidney injury).  Plan: Admit for iv hydration,monitor renal profile and urine output,nutritionist consult,prealbumin level,serial bmp,nutritional supplements,multivitamins,palliative care evaluuation regarding MOLST completion and artificial nutrition discussion.     ·  Problem: Asthma.  Plan: continue current management and present  medications ,seen by pulm consult ,O2 supply.     Problem: Hypertension. Plan: continue home medications.    ·  Problem: Anxiety disorder.  Plan: continue current management and present  medications ,supportive care.     ·  Problem: Constipation.  Plan: bowel regimen.    ·  Problem: Delusional disorder.  Plan: haldol prn.     Problem: Prophylactic measure. Plan; Gastrointestinal stress ulcer prophylaxis and DVT prophylaxis administrated. 89 yo  , [facility] resident with medical hx of  Anxiety disorder  ,Asthma  ,Constipation  ,Delusional disorder  ,Dementia  ,Hyperlipidemia      Hypertension. bib ems for fever. No further hx available due to dementia  .Urosepsis vs aspiration pneumonia suspected Admitted for septic workup and evaluation ,send blood and urine cx,serial lactate levels ,monitor vitals closely hydration ,monitor urine output and renal profile ,iv abx initiated. Pulmonology evaluation obtained Patient found to have lactate elevation and septic workup was sent Palliative care consult requested ,to discuss advance directives and complete MOLST     ·  Problem: Acute febrile illness.  Plan: RULED IN FOR SEPSIS 2/2 TO UTI WITH BACTEREMIA -SEEN BY ID ,14 days of INVANZ recommended ,but daughter /HCP refused PICC placeemnt and iv abx She requests home hospice and pall care Palliative care consult requested ,to discuss advance directives and complete MOLST /initiate CMO.     ·  Problem: UTI (urinary tract infection).  Plan: RULED IN FOR SEPSIS 2/2 TO UTI WITH BACTEREMIA WITH ESBL E.COLI  -SEEN BY ID ,14 days of INVANZ recommended ,but daughter /HCP refused PICC placeemnt and iv abx She requests home hospice and pall care Palliative care consult requested ,to discuss advance directives and complete MOLST /initiate CMO.     ·  Problem: Sepsis.  Plan: Admitted for septic workup and evaluation,send blood and urine cx,serial lactate levels,monitor vitals closley,ivfs hydration,monitor urine output and renal     ·  Problem: HENRIK (acute kidney injury).  Plan: Admit for iv hydration,monitor renal profile and urine output,nutritionist consult,prealbumin level,serial bmp,nutritional supplements,multivitamins,palliative care evaluuation regarding MOLST completion and artificial nutrition discussion.     ·  Problem: Asthma.  Plan: continue current management and present  medications ,seen by pulm consult ,O2 supply.     Problem: Hypertension. Plan: continue home medications.    ·  Problem: Anxiety disorder.  Plan: continue current management and present  medications ,supportive care.     ·  Problem: Constipation.  Plan: bowel regimen.    ·  Problem: Delusional disorder.  Plan: haldol prn. dysphagia - seen by S & s -allow for swallow between intakes; alternate food with liquid; check mouth frequently for oral residue/pocketing; crush medication (when feasible); hard swallow w/ each bite or sip; maintain upright posture during/after eating for 30 mins; no straws; oral hygiene; position upright (90 degrees); small sips/bites    nectar thick liquids, defer solid texture to MD given pt refusal        Problem: Prophylactic measure. Plan; Gastrointestinal stress ulcer prophylaxis and DVT prophylaxis administrated.

## 2020-02-04 NOTE — PROGRESS NOTE ADULT - SUBJECTIVE AND OBJECTIVE BOX
Date/Time Patient Seen:  		  Referring MD:   Data Reviewed	       Patient is a 89y old  Female who presents with a chief complaint of FEVER (03 Feb 2020 19:18)      Subjective/HPI     PAST MEDICAL & SURGICAL HISTORY:  Constipation  Asthma  Hyperlipidemia  Hypertension  Anxiety disorder  Delusional disorder  Hyperlipidemia  Dementia  No significant past surgical history        Medication list         MEDICATIONS  (STANDING):  albuterol/ipratropium for Nebulization 3 milliLiter(s) Nebulizer every 6 hours  buDESOnide    Inhalation Suspension 0.5 milliGRAM(s) Inhalation two times a day  diVALproex Sprinkle 125 milliGRAM(s) Oral two times a day  ertapenem  IVPB 500 milliGRAM(s) IV Intermittent every 24 hours  famotidine    Tablet 20 milliGRAM(s) Oral daily  folic acid 1 milliGRAM(s) Oral daily  heparin  Injectable 5000 Unit(s) SubCutaneous every 12 hours  lactated ringers. 1000 milliLiter(s) (80 mL/Hr) IV Continuous <Continuous>  lactobacillus acidophilus 1 Tablet(s) Oral every 8 hours  levothyroxine 100 MICROGram(s) Oral daily  metoprolol succinate ER 50 milliGRAM(s) Oral two times a day  polyethylene glycol 3350 17 Gram(s) Oral two times a day  QUEtiapine 25 milliGRAM(s) Oral two times a day  senna 2 Tablet(s) Oral at bedtime    MEDICATIONS  (PRN):  acetaminophen   Tablet .. 650 milliGRAM(s) Oral every 4 hours PRN Temp greater or equal to 38C (100.4F), Mild Pain (1 - 3)  ALPRAZolam 0.25 milliGRAM(s) Oral two times a day PRN agitation  haloperidol    Injectable 0.5 milliGRAM(s) IntraMuscular every 6 hours PRN Agitation ,delusions         Vitals log        ICU Vital Signs Last 24 Hrs  T(C): 36.4 (04 Feb 2020 00:00), Max: 37.1 (03 Feb 2020 16:20)  T(F): 97.6 (04 Feb 2020 00:00), Max: 98.7 (03 Feb 2020 16:20)  HR: 82 (04 Feb 2020 05:19) (65 - 92)  BP: 188/97 (04 Feb 2020 05:19) (143/78 - 191/81)  BP(mean): --  ABP: --  ABP(mean): --  RR: 18 (04 Feb 2020 00:00) (17 - 18)  SpO2: 98% (04 Feb 2020 01:29) (87% - 98%)           Input and Output:  I&O's Detail    03 Feb 2020 07:01  -  04 Feb 2020 07:00  --------------------------------------------------------  IN:  Total IN: 0 mL    OUT:    Voided: 600 mL  Total OUT: 600 mL    Total NET: -600 mL          Lab Data                        9.4    9.09  )-----------( 215      ( 04 Feb 2020 06:30 )             29.7     02-04    147<H>  |  111<H>  |  36<H>  ----------------------------<  120<H>  4.0   |  31  |  1.00    Ca    8.5      04 Feb 2020 06:30              Review of Systems	      Objective     Physical Examination    heart s1s2  lung dec BS  abd soft  head nc  head at      Pertinent Lab findings & Imaging      Cally:  NO   Adequate UO     I&O's Detail    03 Feb 2020 07:01  -  04 Feb 2020 07:00  --------------------------------------------------------  IN:  Total IN: 0 mL    OUT:    Voided: 600 mL  Total OUT: 600 mL    Total NET: -600 mL               Discussed with:     Cultures:	        Radiology

## 2020-02-04 NOTE — GOALS OF CARE CONVERSATION - ADVANCED CARE PLANNING - TREATMENT GUIDELINES
No antibiotics/DNR Order/Do not re-hospitalize/No artificial nutrition/No IV fluids/Comfort measures only

## 2020-02-04 NOTE — PROGRESS NOTE ADULT - SUBJECTIVE AND OBJECTIVE BOX
Patient is a 89y Female whom presented to the hospital with ckd and josue   pateint seen and examined nad , no fever , no chills    PAST MEDICAL & SURGICAL HISTORY:  Constipation  Asthma  Hyperlipidemia  Hypertension  Anxiety disorder  Delusional disorder  Dementia  No significant past surgical history      MEDICATIONS  (STANDING):  albuterol/ipratropium for Nebulization 3 milliLiter(s) Nebulizer every 6 hours  buDESOnide    Inhalation Suspension 0.5 milliGRAM(s) Inhalation two times a day  cefTRIAXone   IVPB 1000 milliGRAM(s) IV Intermittent every 24 hours  diVALproex Sprinkle 125 milliGRAM(s) Oral two times a day  famotidine    Tablet 20 milliGRAM(s) Oral daily  folic acid 1 milliGRAM(s) Oral daily  heparin  Injectable 5000 Unit(s) SubCutaneous every 12 hours  lactated ringers. 1000 milliLiter(s) (80 mL/Hr) IV Continuous <Continuous>  lactobacillus acidophilus 1 Tablet(s) Oral every 8 hours  levothyroxine 100 MICROGram(s) Oral daily  metoprolol succinate ER 50 milliGRAM(s) Oral two times a day  polyethylene glycol 3350 17 Gram(s) Oral two times a day  QUEtiapine 25 milliGRAM(s) Oral two times a day  senna 2 Tablet(s) Oral at bedtime      Allergies    aspirin (Anaphylaxis)  aspirin (Unknown)  mangoes (Unknown)  mangos (Unknown)  shellfish (Unknown)                          9.7    9.05  )-----------( 182      ( 03 Feb 2020 06:34 )             30.5       CBC Full  -  ( 03 Feb 2020 06:34 )  WBC Count : 9.05 K/uL  RBC Count : 3.03 M/uL  Hemoglobin : 9.7 g/dL  Hematocrit : 30.5 %  Platelet Count - Automated : 182 K/uL  Mean Cell Volume : 100.7 fl  Mean Cell Hemoglobin : 32.0 pg  Mean Cell Hemoglobin Concentration : 31.8 gm/dL  Auto Neutrophil # : x  Auto Lymphocyte # : x  Auto Monocyte # : x  Auto Eosinophil # : x  Auto Basophil # : x  Auto Neutrophil % : x  Auto Lymphocyte % : x  Auto Monocyte % : x  Auto Eosinophil % : x  Auto Basophil % : x                          9.4    9.09  )-----------( 215      ( 04 Feb 2020 06:30 )             29.7       CBC Full  -  ( 04 Feb 2020 06:30 )  WBC Count : 9.09 K/uL  RBC Count : 2.92 M/uL  Hemoglobin : 9.4 g/dL  Hematocrit : 29.7 %  Platelet Count - Automated : 215 K/uL  Mean Cell Volume : 101.7 fl  Mean Cell Hemoglobin : 32.2 pg  Mean Cell Hemoglobin Concentration : 31.6 gm/dL  Auto Neutrophil # : x  Auto Lymphocyte # : x  Auto Monocyte # : x  Auto Eosinophil # : x  Auto Basophil # : x  Auto Neutrophil % : x  Auto Lymphocyte % : x  Auto Monocyte % : x  Auto Eosinophil % : x  Auto Basophil % : x      02-04    147<H>  |  111<H>  |  36<H>  ----------------------------<  120<H>  4.0   |  31  |  1.00    Ca    8.5      04 Feb 2020 06:30        CAPILLARY BLOOD GLUCOSE          Vital Signs Last 24 Hrs  T(C): 37.1 (04 Feb 2020 15:52), Max: 37.1 (03 Feb 2020 16:20)  T(F): 98.7 (04 Feb 2020 15:52), Max: 98.8 (04 Feb 2020 08:11)  HR: 74 (04 Feb 2020 15:52) (65 - 100)  BP: 190/87 (04 Feb 2020 08:11) (180/90 - 191/81)  BP(mean): --  RR: 20 (04 Feb 2020 15:52) (18 - 20)  SpO2: 90% (04 Feb 2020 15:52) (87% - 98%)          Intolerances        SOCIAL HISTORY:  Denies ETOh,Smoking,     FAMILY HISTORY:  No pertinent family history in first degree relatives      REVIEW OF SYSTEMS:    unable to obtained a good review system                                                                                 PHYSICAL EXAM:    Constitutional: NAD  HEENT: conjunctive   clear   Neck:  No JVD  Respiratory: decreases bs b/l   Cardiovascular: S1 and S2  Gastrointestinal: BS+, soft, NT/ND  Extremities: No peripheral edema  Neurological:  no focal deficits  Psychiatric: Normal mood, normal affect  Skin: dry   Access: Not applicable

## 2020-02-04 NOTE — DISCHARGE NOTE PROVIDER - NSDCFUADDINST_GEN_ALL_CORE_FT
DISCHARGE BACK TO [facility] WITH COMFORT CARE AND HOSPICE EVALUATION PENDING .PROGNOSIS IS POOR

## 2020-02-04 NOTE — PROGRESS NOTE ADULT - ASSESSMENT
DISPOSITION/ PLAN    89 f PMH dementia anxiety asthma delusional disorder HLD hytsoni presented 2020 with fever HENRIK lacticemi  likely sec to UTI and dehydration She was found to have E coli bacteremia ()      INFECTION E coli ESBL bacteremia Rpcephin () --> Ertapenem ()   DEHYDRATION iv fluids   HENRIK Improved   COPD On bd     Clinically improved  Pt placed on cmo  Will sign off     TIME SPENT Over 25 minutes aggregate care time spent on encounter; activities included   direct pt care, counseling and/or coordinating care reviewing notes, lab data/ imaging , discussion with multidisciplinary team/ pt /family. Risks, benefits, alternatives  discussed in detail.    YOVANI HIGGINS [facility] P [phone]  [address] DOA [address] DR DEMETRIA ELIAS

## 2020-02-04 NOTE — DISCHARGE NOTE PROVIDER - CARE PROVIDER_API CALL
Abbie Dooley)  Internal Medicine  [address] [facility], [address]  [place] [address]  Phone: [phone]  Fax: [phone]  Follow Up Time: 1 week

## 2020-02-04 NOTE — DISCHARGE NOTE PROVIDER - INSTRUCTIONS
dysphagia - seen by S & s -allow for swallow between intakes; alternate food with liquid; check mouth frequently for oral residue/pocketing; crush medication (when feasible); hard swallow w/ each bite or sip; maintain upright posture during/after eating for 30 mins; no straws; oral hygiene; position upright (90 degrees); small sips/bites ,nectar thick liquids, defer solid texture to MD given pt refusal

## 2020-02-05 RX ORDER — HYDRALAZINE HCL 50 MG
10 TABLET ORAL
Refills: 0 | Status: DISCONTINUED | OUTPATIENT
Start: 2020-02-05 | End: 2020-02-05

## 2020-02-05 RX ORDER — HYDROCHLOROTHIAZIDE 25 MG
12.5 TABLET ORAL DAILY
Refills: 0 | Status: DISCONTINUED | OUTPATIENT
Start: 2020-02-05 | End: 2020-02-08

## 2020-02-05 RX ORDER — LEVOTHYROXINE SODIUM 125 MCG
0 TABLET ORAL
Qty: 0 | Refills: 0 | DISCHARGE
Start: 2020-02-05

## 2020-02-05 RX ORDER — HYDRALAZINE HCL 50 MG
50 TABLET ORAL THREE TIMES A DAY
Refills: 0 | Status: DISCONTINUED | OUTPATIENT
Start: 2020-02-05 | End: 2020-02-05

## 2020-02-05 RX ORDER — HYDRALAZINE HCL 50 MG
25 TABLET ORAL
Refills: 0 | Status: DISCONTINUED | OUTPATIENT
Start: 2020-02-05 | End: 2020-02-05

## 2020-02-05 RX ORDER — AMLODIPINE BESYLATE 2.5 MG/1
1 TABLET ORAL
Qty: 30 | Refills: 0
Start: 2020-02-05 | End: 2020-03-05

## 2020-02-05 RX ORDER — AMLODIPINE BESYLATE 2.5 MG/1
5 TABLET ORAL DAILY
Refills: 0 | Status: DISCONTINUED | OUTPATIENT
Start: 2020-02-05 | End: 2020-02-05

## 2020-02-05 RX ORDER — AMLODIPINE BESYLATE 2.5 MG/1
5 TABLET ORAL ONCE
Refills: 0 | Status: COMPLETED | OUTPATIENT
Start: 2020-02-05 | End: 2020-02-05

## 2020-02-05 RX ORDER — AMLODIPINE BESYLATE 2.5 MG/1
10 TABLET ORAL DAILY
Refills: 0 | Status: DISCONTINUED | OUTPATIENT
Start: 2020-02-06 | End: 2020-02-08

## 2020-02-05 RX ORDER — HYDRALAZINE HCL 50 MG
100 TABLET ORAL EVERY 8 HOURS
Refills: 0 | Status: DISCONTINUED | OUTPATIENT
Start: 2020-02-05 | End: 2020-02-08

## 2020-02-05 RX ADMIN — AMLODIPINE BESYLATE 5 MILLIGRAM(S): 2.5 TABLET ORAL at 08:54

## 2020-02-05 RX ADMIN — Medication 50 MILLIGRAM(S): at 17:00

## 2020-02-05 RX ADMIN — Medication 50 MILLIGRAM(S): at 06:37

## 2020-02-05 RX ADMIN — Medication 0.1 MILLIGRAM(S): at 19:59

## 2020-02-05 RX ADMIN — Medication 1 PATCH: at 20:13

## 2020-02-05 RX ADMIN — Medication 1 PATCH: at 11:23

## 2020-02-05 RX ADMIN — Medication 100 MILLIGRAM(S): at 19:59

## 2020-02-05 RX ADMIN — AMLODIPINE BESYLATE 5 MILLIGRAM(S): 2.5 TABLET ORAL at 16:58

## 2020-02-05 RX ADMIN — Medication 12.5 MILLIGRAM(S): at 18:31

## 2020-02-05 RX ADMIN — QUETIAPINE FUMARATE 25 MILLIGRAM(S): 200 TABLET, FILM COATED ORAL at 17:00

## 2020-02-05 RX ADMIN — MORPHINE SULFATE 5 MILLIGRAM(S): 50 CAPSULE, EXTENDED RELEASE ORAL at 11:10

## 2020-02-05 RX ADMIN — QUETIAPINE FUMARATE 25 MILLIGRAM(S): 200 TABLET, FILM COATED ORAL at 06:37

## 2020-02-05 RX ADMIN — MORPHINE SULFATE 5 MILLIGRAM(S): 50 CAPSULE, EXTENDED RELEASE ORAL at 11:25

## 2020-02-05 RX ADMIN — POLYETHYLENE GLYCOL 3350 17 GRAM(S): 17 POWDER, FOR SOLUTION ORAL at 17:00

## 2020-02-05 NOTE — PROGRESS NOTE ADULT - SUBJECTIVE AND OBJECTIVE BOX
Date/Time Patient Seen:  		  Referring MD:   Data Reviewed	       Patient is a 89y old  Female who presents with a chief complaint of FEVER (04 Feb 2020 20:25)      Subjective/HPI     PAST MEDICAL & SURGICAL HISTORY:  Constipation  Asthma  Hyperlipidemia  Hypertension  Anxiety disorder  Delusional disorder  Hyperlipidemia  Dementia  No significant past surgical history        Medication list         MEDICATIONS  (STANDING):  metoprolol succinate ER 50 milliGRAM(s) Oral two times a day  polyethylene glycol 3350 17 Gram(s) Oral two times a day  QUEtiapine 25 milliGRAM(s) Oral two times a day  senna 2 Tablet(s) Oral at bedtime    MEDICATIONS  (PRN):  ALPRAZolam 0.25 milliGRAM(s) Oral two times a day PRN agitation  haloperidol    Injectable 0.5 milliGRAM(s) IntraMuscular every 6 hours PRN Agitation ,delusions  morphine Concentrate 5 milliGRAM(s) Oral every 3 hours PRN dyspnea, pain, distress, suffering         Vitals log        ICU Vital Signs Last 24 Hrs  T(C): 37.1 (05 Feb 2020 06:16), Max: 37.1 (04 Feb 2020 08:11)  T(F): 98.7 (05 Feb 2020 06:16), Max: 98.8 (04 Feb 2020 08:11)  HR: 85 (05 Feb 2020 06:16) (68 - 100)  BP: 123/78 (05 Feb 2020 06:16) (123/78 - 195/95)  BP(mean): --  ABP: --  ABP(mean): --  RR: 18 (05 Feb 2020 06:16) (18 - 20)  SpO2: 94% (05 Feb 2020 06:16) (90% - 97%)           Input and Output:  I&O's Detail    03 Feb 2020 07:01  -  04 Feb 2020 07:00  --------------------------------------------------------  IN:  Total IN: 0 mL    OUT:    Voided: 600 mL  Total OUT: 600 mL    Total NET: -600 mL      04 Feb 2020 07:01  -  05 Feb 2020 06:43  --------------------------------------------------------  IN:  Total IN: 0 mL    OUT:    Voided: 300 mL  Total OUT: 300 mL    Total NET: -300 mL          Lab Data                        9.4    9.09  )-----------( 215      ( 04 Feb 2020 06:30 )             29.7     02-04    147<H>  |  111<H>  |  36<H>  ----------------------------<  120<H>  4.0   |  31  |  1.00    Ca    8.5      04 Feb 2020 06:30              Review of Systems	      Objective     Physical Examination    heart 1s2  lung dec bs  abd soft  head nc  head at      Pertinent Lab findings & Imaging      Cally:  NO   Adequate UO     I&O's Detail    03 Feb 2020 07:01  -  04 Feb 2020 07:00  --------------------------------------------------------  IN:  Total IN: 0 mL    OUT:    Voided: 600 mL  Total OUT: 600 mL    Total NET: -600 mL      04 Feb 2020 07:01  -  05 Feb 2020 06:43  --------------------------------------------------------  IN:  Total IN: 0 mL    OUT:    Voided: 300 mL  Total OUT: 300 mL    Total NET: -300 mL               Discussed with:     Cultures:	        Radiology

## 2020-02-05 NOTE — PROGRESS NOTE ADULT - SUBJECTIVE AND OBJECTIVE BOX
Interval History:    CENTRAL LINE:   [  ] YES       [  ] NO  HARO:                 [  ] YES       [  ] NO         REVIEW OF SYSTEMS:  All Systems below were reviewed and are negative [  ]  HEENT:  ID:  Pulmonary:  Cardiac:  GI:  Renal:  Musculoskeletal:  All other systems above were reviewed and are negative   [  ]      MEDICATIONS  (STANDING):  cloNIDine 0.1 milliGRAM(s) Oral every 8 hours  cloNIDine Patch 0.1 mG/24Hr(s) 1 patch Transdermal every 7 days  hydrALAZINE 100 milliGRAM(s) Oral every 8 hours  hydrochlorothiazide 12.5 milliGRAM(s) Oral daily  metoprolol succinate ER 50 milliGRAM(s) Oral two times a day  polyethylene glycol 3350 17 Gram(s) Oral two times a day  QUEtiapine 25 milliGRAM(s) Oral two times a day  senna 2 Tablet(s) Oral at bedtime    MEDICATIONS  (PRN):  ALPRAZolam 0.25 milliGRAM(s) Oral two times a day PRN agitation  haloperidol    Injectable 0.5 milliGRAM(s) IntraMuscular every 6 hours PRN Agitation ,delusions  morphine Concentrate 5 milliGRAM(s) Oral every 3 hours PRN dyspnea, pain, distress, suffering      Vital Signs Last 24 Hrs  T(C): 37.1 (05 Feb 2020 06:16), Max: 37.1 (05 Feb 2020 06:16)  T(F): 98.7 (05 Feb 2020 06:16), Max: 98.7 (05 Feb 2020 06:16)  HR: 68 (05 Feb 2020 15:52) (68 - 85)  BP: 210/68 (05 Feb 2020 19:57) (123/78 - 230/90)  BP(mean): --  RR: 18 (05 Feb 2020 06:16) (18 - 20)  SpO2: 94% (05 Feb 2020 06:16) (94% - 94%)    I&O's Summary    04 Feb 2020 07:01  -  05 Feb 2020 07:00  --------------------------------------------------------  IN: 0 mL / OUT: 300 mL / NET: -300 mL    05 Feb 2020 07:01  -  05 Feb 2020 21:00  --------------------------------------------------------  IN: 720 mL / OUT: 0 mL / NET: 720 mL        PHYSICAL EXAM:  HEENT: NC/AT; PERRLA  Neck: Soft; no tenderness  Lungs: CTA bilaterally; no wheezing.   Heart:  Abdomen:  Genital/ Rectal:  Extremities:  Neurologic:  Vascular:      LABORATORY:    CBC Full  -  ( 04 Feb 2020 06:30 )  WBC Count : 9.09 K/uL  RBC Count : 2.92 M/uL  Hemoglobin : 9.4 g/dL  Hematocrit : 29.7 %  Platelet Count - Automated : 215 K/uL  Mean Cell Volume : 101.7 fl  Mean Cell Hemoglobin : 32.2 pg  Mean Cell Hemoglobin Concentration : 31.6 gm/dL  Auto Neutrophil # : x  Auto Lymphocyte # : x  Auto Monocyte # : x  Auto Eosinophil # : x  Auto Basophil # : x  Auto Neutrophil % : x  Auto Lymphocyte % : x  Auto Monocyte % : x  Auto Eosinophil % : x  Auto Basophil % : x          02-04    147<H>  |  111<H>  |  36<H>  ----------------------------<  120<H>  4.0   |  31  |  1.00    Ca    8.5      04 Feb 2020 06:30        Rapid Respiratory Viral Panel Result        01-30 @ 22:12  Rapid RVP [facility]  Coronovirus --  Adenovirus --  Bordetella Pertussis --  Chlamydia Pneumonia --  Entero/Rhinovirus--  HKU1 Coronovirus --  HMPV Coronovirus --  Influenza A --  Influenza AH1 --  Influenza AH1 2009 --  Influenza AH3 --  Influenza B --  Mycoplasma Pneumoniae --  NL63 Coronovirus --  OC43 Coronovirus --  Parainfluenza 1 --  Parainfluenza 2 --  Parainfluenza 3 --  Parainfluenza 4 --  Resp Syncytial Virus --      Assessment and Plan:          Dane Barrera MD   [phone]. She is sleeping in bed  No fevers noted.     MEDICATIONS  (STANDING):  cloNIDine 0.1 milliGRAM(s) Oral every 8 hours  cloNIDine Patch 0.1 mG/24Hr(s) 1 patch Transdermal every 7 days  hydrALAZINE 100 milliGRAM(s) Oral every 8 hours  hydrochlorothiazide 12.5 milliGRAM(s) Oral daily  metoprolol succinate ER 50 milliGRAM(s) Oral two times a day  polyethylene glycol 3350 17 Gram(s) Oral two times a day  QUEtiapine 25 milliGRAM(s) Oral two times a day  senna 2 Tablet(s) Oral at bedtime    MEDICATIONS  (PRN):  ALPRAZolam 0.25 milliGRAM(s) Oral two times a day PRN agitation  haloperidol    Injectable 0.5 milliGRAM(s) IntraMuscular every 6 hours PRN Agitation ,delusions  morphine Concentrate 5 milliGRAM(s) Oral every 3 hours PRN dyspnea, pain, distress, suffering      Vital Signs Last 24 Hrs  T(C): 37.1 (05 Feb 2020 06:16), Max: 37.1 (05 Feb 2020 06:16)  T(F): 98.7 (05 Feb 2020 06:16), Max: 98.7 (05 Feb 2020 06:16)  HR: 68 (05 Feb 2020 15:52) (68 - 85)  BP: 210/68 (05 Feb 2020 19:57) (123/78 - 230/90)  BP(mean): --  RR: 18 (05 Feb 2020 06:16) (18 - 20)  SpO2: 94% (05 Feb 2020 06:16) (94% - 94%)    I&O's Summary    04 Feb 2020 07:01  -  05 Feb 2020 07:00  --------------------------------------------------------  IN: 0 mL / OUT: 300 mL / NET: -300 mL    05 Feb 2020 07:01  -  05 Feb 2020 21:00  --------------------------------------------------------  IN: 720 mL / OUT: 0 mL / NET: 720 mL      PHYSICAL EXAM:  HEENT: NC/AT; PERRLA  Neck: Soft; no tenderness  Lungs: Coarse BS bilaterally; no wheezing.   Heart: RRR; no murmurs.   Abdomen: Soft; no tenderness.   Extremities: No ulcers.   Neurologic: Lethargic.     LABORATORY:    CBC Full  -  ( 04 Feb 2020 06:30 )  WBC Count : 9.09 K/uL  RBC Count : 2.92 M/uL  Hemoglobin : 9.4 g/dL  Hematocrit : 29.7 %  Platelet Count - Automated : 215 K/uL  Mean Cell Volume : 101.7 fl  Mean Cell Hemoglobin : 32.2 pg  Mean Cell Hemoglobin Concentration : 31.6 gm/dL  Auto Neutrophil # : x  Auto Lymphocyte # : x  Auto Monocyte # : x  Auto Eosinophil # : x  Auto Basophil # : x  Auto Neutrophil % : x  Auto Lymphocyte % : x  Auto Monocyte % : x  Auto Eosinophil % : x  Auto Basophil % : x      147<H>  |  111<H>  |  36<H>  ----------------------------<  120<H>  4.0   |  31  |  1.00    Ca    8.5      04 Feb 2020 06:30        Rapid Respiratory Viral Panel Result        01-30 @ 22:12  Rapid RVP [facility]  Coronovirus --  Adenovirus --  Bordetella Pertussis --  Chlamydia Pneumonia --  Entero/Rhinovirus--  HKU1 Coronovirus --  HMPV Coronovirus --  Influenza A --  Influenza AH1 --  Influenza AH1 2009 --  Influenza AH3 --  Influenza B --  Mycoplasma Pneumoniae --  NL63 Coronovirus --  OC43 Coronovirus --  Parainfluenza 1 --  Parainfluenza 2 --  Parainfluenza 3 --  Parainfluenza 4 --  Resp Syncytial Virus --      Assessment and Plan:    1. Sepsis due to UTI with ESBL E coli.   2. Bacteremia with ESBL E coli.     . Family refused Picc line and IV antibiotics.  . Hospice evaluation.  . Will no longer follow.      Dane Barrera MD   [phone].

## 2020-02-05 NOTE — CONSULT NOTE ADULT - SUBJECTIVE AND OBJECTIVE BOX
CARDIOLOGY CONSULT NOTE    Patient is a 89y Female with a known history of :  Palliative care encounter (Z51.5)  Dementia (F03.90)  Goals of care, counseling/discussion (Z71.89)  Fever (R50.9)  Prophylactic measure (Z29.9)  Delusional disorder (F22)  Constipation (K59.00)  Anxiety disorder (F41.9)  Hypertension (I10)  Asthma (J45.909)  HENRIK (acute kidney injury) (N17.9)  Sepsis (A41.9)  UTI (urinary tract infection) (N39.0)  Acute febrile illness (R50.9)    HPI:  89 yo  , long-term resident with medical hx of  Anxiety disorder  ,Asthma  ,Constipation  ,Delusional disorder  ,Dementia  ,Hyperlipidemia    Hypertension. bib ems for fever. No further hx available due to dementia  .Urosepsis vs aspiration pneumonia suspected Admitted for septic workup and evaluation ,send blood and urine cx,serial lactate levels ,monitor vitals closely hydration ,monitor urine output and renal profile ,iv abx initiated. Pulmonology evaluation obtained Patient found to have lactate elevation and septic workup was sent Palliative care consult requested ,to discuss advance directives and complete MOLST (30 Jan 2020 12:43)      REVIEW OF SYSTEMS:    CONSTITUTIONAL: No fever, weight loss, or fatigue  EYES: No eye pain, visual disturbances, or discharge  ENMT:  No difficulty hearing, tinnitus, vertigo; No sinus or throat pain  NECK: No pain or stiffness  BREASTS: No pain, masses, or nipple discharge  RESPIRATORY: No cough, wheezing, chills or hemoptysis; No shortness of breath  CARDIOVASCULAR: No chest pain, palpitations, dizziness, or leg swelling  GASTROINTESTINAL: No abdominal or epigastric pain. No nausea, vomiting, or hematemesis; No diarrhea or constipation. No melena or hematochezia.  GENITOURINARY: No dysuria, frequency, hematuria, or incontinence  NEUROLOGICAL: No headaches, memory loss, loss of strength, numbness, or tremors  SKIN: No itching, burning, rashes, or lesions   LYMPH NODES: No enlarged glands  ENDOCRINE: No heat or cold intolerance; No hair loss  MUSCULOSKELETAL: No joint pain or swelling; No muscle, back, or extremity pain  PSYCHIATRIC: No depression, anxiety, mood swings, or difficulty sleeping  HEME/LYMPH: No easy bruising, or bleeding gums  ALLERGY AND IMMUNOLOGIC: No hives or eczema    MEDICATIONS  (STANDING):  amLODIPine   Tablet 5 milliGRAM(s) Oral once  cloNIDine Patch 0.1 mG/24Hr(s) 1 patch Transdermal every 7 days  hydrALAZINE 50 milliGRAM(s) Oral three times a day  hydrochlorothiazide 12.5 milliGRAM(s) Oral daily  metoprolol succinate ER 50 milliGRAM(s) Oral two times a day  polyethylene glycol 3350 17 Gram(s) Oral two times a day  QUEtiapine 25 milliGRAM(s) Oral two times a day  senna 2 Tablet(s) Oral at bedtime    MEDICATIONS  (PRN):  ALPRAZolam 0.25 milliGRAM(s) Oral two times a day PRN agitation  haloperidol    Injectable 0.5 milliGRAM(s) IntraMuscular every 6 hours PRN Agitation ,delusions  morphine Concentrate 5 milliGRAM(s) Oral every 3 hours PRN dyspnea, pain, distress, suffering      ALLERGIES: aspirin (Anaphylaxis)  aspirin (Unknown)  mangoes (Unknown)  mangos (Unknown)  shellfish (Unknown)      Social History:     FAMILY HISTORY:  No pertinent family history in first degree relatives      PAST MEDICAL & SURGICAL HISTORY:  Constipation  Asthma  Hyperlipidemia  Hypertension  Anxiety disorder  Delusional disorder  Dementia  No significant past surgical history        PHYSICAL EXAMINATION:  -----------------------------  T(C): 37.1 (02-05-20 @ 06:16), Max: 37.1 (02-05-20 @ 06:16)  HR: 68 (02-05-20 @ 15:52) (68 - 85)  BP: 212/94 (02-05-20 @ 15:52) (123/78 - 230/90)  RR: 18 (02-05-20 @ 06:16) (18 - 20)  SpO2: 94% (02-05-20 @ 06:16) (94% - 94%)  Wt(kg): --    02-04 @ 07:01  -  02-05 @ 07:00  --------------------------------------------------------  IN:  Total IN: 0 mL    OUT:    Voided: 300 mL  Total OUT: 300 mL    Total NET: -300 mL      02-05 @ 07:01  -  02-05 @ 16:03  --------------------------------------------------------  IN:    Oral Fluid: 720 mL  Total IN: 720 mL    OUT:  Total OUT: 0 mL    Total NET: 720 mL            Constitutional: well developed, normal appearance, well groomed, well nourished, no deformities and no acute distress.   Eyes: the conjunctiva exhibited no abnormalities and the eyelids demonstrated no xanthelasmas.   HEENT: normal oral mucosa, no oral pallor and no oral cyanosis.   Neck: normal jugular venous A waves present, normal jugular venous V waves present and no jugular venous walsh A waves.   Pulmonary: no respiratory distress, normal respiratory rhythm and effort, no accessory muscle use and lungs were clear to auscultation bilaterally.   Cardiovascular: heart rate and rhythm were normal, normal S1 and S2 and no murmur, gallop, rub, heave or thrill are present.   Abdomen: soft, non-tender, no hepato-splenomegaly and no abdominal mass palpated.   Musculoskeletal: the gait could not be assessed..   Extremities: no clubbing of the fingernails, no localized cyanosis, no petechial hemorrhages and no ischemic changes.   Skin: normal skin color and pigmentation, no rash, no venous stasis, no skin lesions, no skin ulcer and no xanthoma was observed.   Psychiatric: oriented to person, place, and time, the affect was normal, the mood was normal and not feeling anxious.     LABS:   --------  02-04    147<H>  |  111<H>  |  36<H>  ----------------------------<  120<H>  4.0   |  31  |  1.00    Ca    8.5      04 Feb 2020 06:30                           9.4    9.09  )-----------( 215      ( 04 Feb 2020 06:30 )             29.7                 RADIOLOGY:  -----------------        ECG:     ECHO

## 2020-02-05 NOTE — CONSULT NOTE ADULT - ASSESSMENT
admitted with fever   uncotrolled bp - 210/90  increase the dose norvasc 10 mg  increase hydralazine 50 mg tid   on lopressor 50 mg bid and clonidine  add small doses of diuretics admitted with fever   uncontrolled bp - 210/90  increase the dose norvasc 10 mg  increase hydralazine 50 mg tid   on lopressor 50 mg bid and clonidine  add small doses of diuretics

## 2020-02-05 NOTE — PROGRESS NOTE ADULT - SUBJECTIVE AND OBJECTIVE BOX
Chart and available morning labs /imaging are reviewed electronically , urgent issues addressed . More information  is being added upon completion of rounds , when more information is collected and management discussed with consultants , medical staff and social service/case management on the floor LATE ENTRY- patient was seen and examined 02/05/20 . Plan of care was discussed with med staff and unit coordinator .   Patient is a 89y Female whom presented to the hospital with ckd and josue   pateint seen and examined nad , no fever , no chills    PAST MEDICAL & SURGICAL HISTORY:  Constipation  Asthma  Hyperlipidemia  Hypertension  Anxiety disorder  Delusional disorder  Dementia  No significant past surgical history    No new issues reported by medical staff . All above noted Patient is resting in a bed comfortably .Confused ,poor mentation .No distress noted   MEDICATIONS  (STANDING):  albuterol/ipratropium for Nebulization 3 milliLiter(s) Nebulizer every 6 hours  buDESOnide    Inhalation Suspension 0.5 milliGRAM(s) Inhalation two times a day  cefTRIAXone   IVPB 1000 milliGRAM(s) IV Intermittent every 24 hours  diVALproex Sprinkle 125 milliGRAM(s) Oral two times a day  famotidine    Tablet 20 milliGRAM(s) Oral daily  folic acid 1 milliGRAM(s) Oral daily  heparin  Injectable 5000 Unit(s) SubCutaneous every 12 hours  lactated ringers. 1000 milliLiter(s) (80 mL/Hr) IV Continuous <Continuous>  lactobacillus acidophilus 1 Tablet(s) Oral every 8 hours  levothyroxine 100 MICROGram(s) Oral daily  metoprolol succinate ER 50 milliGRAM(s) Oral two times a day  polyethylene glycol 3350 17 Gram(s) Oral two times a day  QUEtiapine 25 milliGRAM(s) Oral two times a day  senna 2 Tablet(s) Oral at bedtime      Allergies    aspirin (Anaphylaxis)  aspirin (Unknown)  mangoes (Unknown)  mangos (Unknown)  shellfish (Unknown)                          9.7    9.05  )-----------( 182      ( 03 Feb 2020 06:34 )             30.5       CBC Full  -  ( 03 Feb 2020 06:34 )  WBC Count : 9.05 K/uL  RBC Count : 3.03 M/uL  Hemoglobin : 9.7 g/dL  Hematocrit : 30.5 %  Platelet Count - Automated : 182 K/uL  Mean Cell Volume : 100.7 fl  Mean Cell Hemoglobin : 32.0 pg  Mean Cell Hemoglobin Concentration : 31.8 gm/dL  Auto Neutrophil # : x  Auto Lymphocyte # : x  Auto Monocyte # : x  Auto Eosinophil # : x  Auto Basophil # : x  Auto Neutrophil % : x  Auto Lymphocyte % : x  Auto Monocyte % : x  Auto Eosinophil % : x  Auto Basophil % : x                          9.4    9.09  )-----------( 215      ( 04 Feb 2020 06:30 )             29.7       CBC Full  -  ( 04 Feb 2020 06:30 )  WBC Count : 9.09 K/uL  RBC Count : 2.92 M/uL  Hemoglobin : 9.4 g/dL  Hematocrit : 29.7 %  Platelet Count - Automated : 215 K/uL  Mean Cell Volume : 101.7 fl  Mean Cell Hemoglobin : 32.2 pg  Mean Cell Hemoglobin Concentration : 31.6 gm/dL  Auto Neutrophil # : x  Auto Lymphocyte # : x  Auto Monocyte # : x  Auto Eosinophil # : x  Auto Basophil # : x  Auto Neutrophil % : x  Auto Lymphocyte % : x  Auto Monocyte % : x  Auto Eosinophil % : x  Auto Basophil % : x      02-04    147<H>  |  111<H>  |  36<H>  ----------------------------<  120<H>  4.0   |  31  |  1.00    Ca    8.5      04 Feb 2020 06:30        CAPILLARY BLOOD GLUCOSE                      CAPILLARY BLOOD GLUCOSE          Vital Signs Last 24 Hrs  T(C): 36.7 (06 Feb 2020 05:17), Max: 36.8 (06 Feb 2020 00:08)  T(F): 98 (06 Feb 2020 05:17), Max: 98.2 (06 Feb 2020 00:08)  HR: 63 (06 Feb 2020 14:06) (63 - 90)  BP: 186/70 (06 Feb 2020 14:06) (122/73 - 218/90)  BP(mean): --  RR: 20 (06 Feb 2020 05:17) (20 - 20)  SpO2: 96% (06 Feb 2020 05:17) (96% - 96%)            Vital Signs Last 24 Hrs  T(C): 37.1 (04 Feb 2020 15:52), Max: 37.1 (03 Feb 2020 16:20)  T(F): 98.7 (04 Feb 2020 15:52), Max: 98.8 (04 Feb 2020 08:11)  HR: 74 (04 Feb 2020 15:52) (65 - 100)  BP: 190/87 (04 Feb 2020 08:11) (180/90 - 191/81)  BP(mean): --  RR: 20 (04 Feb 2020 15:52) (18 - 20)  SpO2: 90% (04 Feb 2020 15:52) (87% - 98%)          Intolerances        SOCIAL HISTORY:  Denies ETOh,Smoking,     FAMILY HISTORY:  No pertinent family history in first degree relatives      REVIEW OF SYSTEMS:    unable to obtained a good review system                                                                                 PHYSICAL EXAM:    Constitutional: NAD  HEENT: conjunctive   clear   Neck:  No JVD  Respiratory: decreases bs b/l   Cardiovascular: S1 and S2  Gastrointestinal: BS+, soft, NT/ND  Extremities: No peripheral edema  Neurological:  no focal deficits  Psychiatric: Normal mood, normal affect  Skin: dry   Access: Not applicable  45minutes spent on this visit, 50% visit time spent in care co-ordination with other attendings and counselling patient  I have discussed care plan with patient and HCP,expressed understanding of problems treatment and their effect and side effects, alternatives in detail,I have asked if they have any questions and concerns and appropriately addressed them to best of my ability

## 2020-02-05 NOTE — PROGRESS NOTE ADULT - SUBJECTIVE AND OBJECTIVE BOX
Patient is a 89y Female whom presented to the hospital with ckd and josue   pateint seen and examined nad , no fever , no chills    PAST MEDICAL & SURGICAL HISTORY:  Constipation  Asthma  Hyperlipidemia  Hypertension  Anxiety disorder  Delusional disorder  Dementia  No significant past surgical history      MEDICATIONS  (STANDING):  albuterol/ipratropium for Nebulization 3 milliLiter(s) Nebulizer every 6 hours  buDESOnide    Inhalation Suspension 0.5 milliGRAM(s) Inhalation two times a day  cefTRIAXone   IVPB 1000 milliGRAM(s) IV Intermittent every 24 hours  diVALproex Sprinkle 125 milliGRAM(s) Oral two times a day  famotidine    Tablet 20 milliGRAM(s) Oral daily  folic acid 1 milliGRAM(s) Oral daily  heparin  Injectable 5000 Unit(s) SubCutaneous every 12 hours  lactated ringers. 1000 milliLiter(s) (80 mL/Hr) IV Continuous <Continuous>  lactobacillus acidophilus 1 Tablet(s) Oral every 8 hours  levothyroxine 100 MICROGram(s) Oral daily  metoprolol succinate ER 50 milliGRAM(s) Oral two times a day  polyethylene glycol 3350 17 Gram(s) Oral two times a day  QUEtiapine 25 milliGRAM(s) Oral two times a day  senna 2 Tablet(s) Oral at bedtime      Allergies    aspirin (Anaphylaxis)  aspirin (Unknown)  mangoes (Unknown)  mangos (Unknown)  shellfish (Unknown)                          9.7    9.05  )-----------( 182      ( 03 Feb 2020 06:34 )             30.5       CBC Full  -  ( 03 Feb 2020 06:34 )  WBC Count : 9.05 K/uL  RBC Count : 3.03 M/uL  Hemoglobin : 9.7 g/dL  Hematocrit : 30.5 %  Platelet Count - Automated : 182 K/uL  Mean Cell Volume : 100.7 fl  Mean Cell Hemoglobin : 32.0 pg  Mean Cell Hemoglobin Concentration : 31.8 gm/dL  Auto Neutrophil # : x  Auto Lymphocyte # : x  Auto Monocyte # : x  Auto Eosinophil # : x  Auto Basophil # : x  Auto Neutrophil % : x  Auto Lymphocyte % : x  Auto Monocyte % : x  Auto Eosinophil % : x  Auto Basophil % : x                          9.4    9.09  )-----------( 215      ( 04 Feb 2020 06:30 )             29.7       CBC Full  -  ( 04 Feb 2020 06:30 )  WBC Count : 9.09 K/uL  RBC Count : 2.92 M/uL  Hemoglobin : 9.4 g/dL  Hematocrit : 29.7 %  Platelet Count - Automated : 215 K/uL  Mean Cell Volume : 101.7 fl  Mean Cell Hemoglobin : 32.2 pg  Mean Cell Hemoglobin Concentration : 31.6 gm/dL  Auto Neutrophil # : x  Auto Lymphocyte # : x  Auto Monocyte # : x  Auto Eosinophil # : x  Auto Basophil # : x  Auto Neutrophil % : x  Auto Lymphocyte % : x  Auto Monocyte % : x  Auto Eosinophil % : x  Auto Basophil % : x      02-04    147<H>  |  111<H>  |  36<H>  ----------------------------<  120<H>  4.0   |  31  |  1.00    Ca    8.5      04 Feb 2020 06:30        CAPILLARY BLOOD GLUCOSE                      CAPILLARY BLOOD GLUCOSE          Vital Signs Last 24 Hrs  T(C): 36.7 (06 Feb 2020 05:17), Max: 36.8 (06 Feb 2020 00:08)  T(F): 98 (06 Feb 2020 05:17), Max: 98.2 (06 Feb 2020 00:08)  HR: 63 (06 Feb 2020 14:06) (63 - 90)  BP: 186/70 (06 Feb 2020 14:06) (122/73 - 218/90)  BP(mean): --  RR: 20 (06 Feb 2020 05:17) (20 - 20)  SpO2: 96% (06 Feb 2020 05:17) (96% - 96%)            Vital Signs Last 24 Hrs  T(C): 37.1 (04 Feb 2020 15:52), Max: 37.1 (03 Feb 2020 16:20)  T(F): 98.7 (04 Feb 2020 15:52), Max: 98.8 (04 Feb 2020 08:11)  HR: 74 (04 Feb 2020 15:52) (65 - 100)  BP: 190/87 (04 Feb 2020 08:11) (180/90 - 191/81)  BP(mean): --  RR: 20 (04 Feb 2020 15:52) (18 - 20)  SpO2: 90% (04 Feb 2020 15:52) (87% - 98%)          Intolerances        SOCIAL HISTORY:  Denies ETOh,Smoking,     FAMILY HISTORY:  No pertinent family history in first degree relatives      REVIEW OF SYSTEMS:    unable to obtained a good review system                                                                                 PHYSICAL EXAM:    Constitutional: NAD  HEENT: conjunctive   clear   Neck:  No JVD  Respiratory: decreases bs b/l   Cardiovascular: S1 and S2  Gastrointestinal: BS+, soft, NT/ND  Extremities: No peripheral edema  Neurological:  no focal deficits  Psychiatric: Normal mood, normal affect  Skin: dry   Access: Not applicable

## 2020-02-06 LAB
CULTURE RESULTS: SIGNIFICANT CHANGE UP
SPECIMEN SOURCE: SIGNIFICANT CHANGE UP

## 2020-02-06 RX ORDER — HYDRALAZINE HCL 50 MG
100 TABLET ORAL ONCE
Refills: 0 | Status: COMPLETED | OUTPATIENT
Start: 2020-02-06 | End: 2020-02-06

## 2020-02-06 RX ORDER — HYDRALAZINE HCL 50 MG
100 TABLET ORAL ONCE
Refills: 0 | Status: DISCONTINUED | OUTPATIENT
Start: 2020-02-06 | End: 2020-02-06

## 2020-02-06 RX ADMIN — Medication 1 PATCH: at 07:14

## 2020-02-06 RX ADMIN — Medication 1 PATCH: at 20:00

## 2020-02-06 RX ADMIN — Medication 0.1 MILLIGRAM(S): at 21:17

## 2020-02-06 RX ADMIN — Medication 50 MILLIGRAM(S): at 17:06

## 2020-02-06 RX ADMIN — Medication 100 MILLIGRAM(S): at 14:08

## 2020-02-06 RX ADMIN — Medication 0.1 MILLIGRAM(S): at 05:24

## 2020-02-06 RX ADMIN — Medication 100 MILLIGRAM(S): at 02:03

## 2020-02-06 RX ADMIN — QUETIAPINE FUMARATE 25 MILLIGRAM(S): 200 TABLET, FILM COATED ORAL at 17:06

## 2020-02-06 RX ADMIN — Medication 0.1 MILLIGRAM(S): at 14:08

## 2020-02-06 RX ADMIN — Medication 12.5 MILLIGRAM(S): at 05:24

## 2020-02-06 RX ADMIN — SENNA PLUS 2 TABLET(S): 8.6 TABLET ORAL at 21:17

## 2020-02-06 RX ADMIN — Medication 50 MILLIGRAM(S): at 05:24

## 2020-02-06 RX ADMIN — QUETIAPINE FUMARATE 25 MILLIGRAM(S): 200 TABLET, FILM COATED ORAL at 05:24

## 2020-02-06 RX ADMIN — AMLODIPINE BESYLATE 10 MILLIGRAM(S): 2.5 TABLET ORAL at 05:24

## 2020-02-06 RX ADMIN — Medication 100 MILLIGRAM(S): at 21:17

## 2020-02-06 RX ADMIN — POLYETHYLENE GLYCOL 3350 17 GRAM(S): 17 POWDER, FOR SOLUTION ORAL at 17:06

## 2020-02-06 NOTE — PROVIDER CONTACT NOTE (OTHER) - ACTION/TREATMENT ORDERED:
Notified Dr. Carrillo of post medication persistent blood pressure. Informed to call Dr. Romero. Call placed to Dr. Romero. Awaiting call back.
Order stands take bp prior to administer meds. No further orders at this time
Dr. Wren made aware, orders to follow .

## 2020-02-06 NOTE — CHART NOTE - NSCHARTNOTEFT_GEN_A_CORE
Assessment: Pt seen for nutrition follow up. Chart reviewed, hospital course noted. Pt now comfort measures only. MOLST reviewed: DNI/DNR, no tube feeding, no IV fluids.    Per RN, pt with poor po intake, appetite. Refused most of breakfast this AM. Pt alert, able to answer some questions appropriately. With h/o of dementia but able to provide preferences. Likes vanilla, asking for pudding/ice cream. No N/V/diarrhea/constipation, last BM 2/5 per flowsheets.     Factors impacting intake: [ ] none [ ] nausea  [ ] vomiting [ ] diarrhea [ ] constipation  [ ]chewing problems [ ] swallowing issues  [X] other: lack of appetite, illness/end of life    Diet, Dysphagia 2 Mechanical Soft-Nectar Consistency Fluid:   Supplement Feeding Modality:  Oral  Ensure Enlive Servings Per Day:  1       Frequency:  Daily (01-30-20 @ 15:36)    Intake: variable but predicted suboptimal, 25-50% of meals    Current Weight: 137 pounds (admission) no new weight    Pertinent Medications: MEDICATIONS  (STANDING):  amLODIPine   Tablet 10 milliGRAM(s) Oral daily  cloNIDine 0.1 milliGRAM(s) Oral every 8 hours  cloNIDine Patch 0.1 mG/24Hr(s) 1 patch Transdermal every 7 days  hydrALAZINE 100 milliGRAM(s) Oral every 8 hours  hydrochlorothiazide 12.5 milliGRAM(s) Oral daily  metoprolol succinate ER 50 milliGRAM(s) Oral two times a day  polyethylene glycol 3350 17 Gram(s) Oral two times a day  QUEtiapine 25 milliGRAM(s) Oral two times a day  senna 2 Tablet(s) Oral at bedtime    MEDICATIONS  (PRN):  ALPRAZolam 0.25 milliGRAM(s) Oral two times a day PRN agitation  haloperidol    Injectable 0.5 milliGRAM(s) IntraMuscular every 6 hours PRN Agitation ,delusions  morphine Concentrate 5 milliGRAM(s) Oral every 3 hours PRN dyspnea, pain, distress, suffering    Pertinent Labs: 02-04 Na147 mmol/L<H> Glu 120 mg/dL<H> K+ 4.0 mmol/L Cr  1.00 mg/dL BUN 36 mg/dL<H> 02-01 Phos 3.3 mg/dL 02-01 Alb 2.1 g/dL<L>     CAPILLARY BLOOD GLUCOSE        Skin: +1 generalized edema, B/L ankle    Estimated Needs:   [X] no change since previous assessment  [ ] recalculated:     Previous Nutrition Diagnosis:   [ ] Inadequate Energy Intake [ ]Inadequate Oral Intake [ ] Excessive Energy Intake   [ ] Underweight [X] Increased Nutrient Needs [ ] Overweight/Obesity   [ ] Altered GI Function [ ] Unintended Weight Loss [ ] Food & Nutrition Related Knowledge Deficit [ ] Malnutrition     Nutrition Diagnosis is [X] ongoing  [ ] resolved [ ] not applicable     New Nutrition Diagnosis: [X] not applicable       Interventions:   Recommend  [ ] Change Diet To:  [X] Nutrition Supplement: Ensure Enlive BID as ordered. Add Ensure pudding BID. Add Magic cup dysphagia ice cream.   [ ] Nutrition Support  [X] Other: SLP re-evaluation to see if patient can tolerate upgraded diet texture and fluid consistency? Encourage po intake, honor preferences. Supportive care. RD to remain available for nutrition interventions within pt's GOC.    Monitoring and Evaluation:   [X] PO intake [ x ] Tolerance to diet prescription [ x ] weights [ x ] labs[ x ] follow up per protocol  [ ] other:

## 2020-02-06 NOTE — GOALS OF CARE CONVERSATION - ADVANCED CARE PLANNING - NS PRO AD PATIENT TYPE ON CHART
Health Care Proxy (HCP)/Living Will/Medical Orders for Life-Sustaining Treatment (MOLST)
Health Care Proxy (HCP)/Living Will/Medical Orders for Life-Sustaining Treatment (MOLST)
Medical Orders for Life-Sustaining Treatment (MOLST)/copy of molst/Living Will/Health Care Proxy (HCP)

## 2020-02-06 NOTE — PROGRESS NOTE ADULT - SUBJECTIVE AND OBJECTIVE BOX
PROGRESS NOTE  Patient is a 89y old  Female who presents with a chief complaint of FEVER (06 Feb 2020 15:26)  Chart and available morning labs /imaging are reviewed electronically , urgent issues addressed . More information  is being added upon completion of rounds , when more information is collected and management discussed with consultants , medical staff and social service/case management on the floor     OVERNIGHT  Patient is resting in a bed comfortably .Confused ,poor mentation .No distress noted O2 sat on RA 84% WILL REQUIRE HOME OXYGEN   SEEN BY HOSPICE NETWORK AS PER DAUGHTER REQUEST ,ANTICIPATE D/C IN AM   HPI:  89 yo WF [facility] resident with medical hx of  Anxiety disorder  ,Asthma  ,Constipation  ,Delusional disorder  ,Dementia  ,Hyperlipidemia    Hypertension. bib ems for fever. No further hx available due to dementia  .Urosepsis vs aspiration pneumonia suspected Admitted for septic workup and evaluation ,send blood and urine cx,serial lactate levels ,monitor vitals closely hydration ,monitor urine output and renal profile ,iv abx initiated. Pulmonology evaluation obtained Patient found to have lactate elevation and septic workup was sent Palliative care consult requested ,to discuss advance directives and complete MOLST (30 Jan 2020 12:43)    PAST MEDICAL & SURGICAL HISTORY:  Constipation  Asthma  Hyperlipidemia  Hypertension  Anxiety disorder  Delusional disorder  Dementia  No significant past surgical history      MEDICATIONS  (STANDING):  amLODIPine   Tablet 10 milliGRAM(s) Oral daily  cloNIDine 0.1 milliGRAM(s) Oral every 8 hours  cloNIDine Patch 0.1 mG/24Hr(s) 1 patch Transdermal every 7 days  hydrALAZINE 100 milliGRAM(s) Oral every 8 hours  hydrochlorothiazide 12.5 milliGRAM(s) Oral daily  metoprolol succinate ER 50 milliGRAM(s) Oral two times a day  polyethylene glycol 3350 17 Gram(s) Oral two times a day  QUEtiapine 25 milliGRAM(s) Oral two times a day  senna 2 Tablet(s) Oral at bedtime    MEDICATIONS  (PRN):  ALPRAZolam 0.25 milliGRAM(s) Oral two times a day PRN agitation  haloperidol    Injectable 0.5 milliGRAM(s) IntraMuscular every 6 hours PRN Agitation ,delusions  morphine Concentrate 5 milliGRAM(s) Oral every 3 hours PRN dyspnea, pain, distress, suffering      OBJECTIVE    T(C): 37.1 (02-06-20 @ 16:09), Max: 37.1 (02-06-20 @ 16:09)  HR: 66 (02-06-20 @ 16:09) (63 - 90)  BP: 124/67 (02-06-20 @ 16:09) (122/73 - 218/90)  RR: 24 (02-06-20 @ 16:09) (20 - 24)  SpO2: 95% (02-06-20 @ 16:09) (95% - 96%)  Wt(kg): --  I&O's Summary    05 Feb 2020 07:01  -  06 Feb 2020 07:00  --------------------------------------------------------  IN: 720 mL / OUT: 0 mL / NET: 720 mL          REVIEW OF SYSTEMS:  ROS is unobtainable due to dementia and confusion     PHYSICAL EXAM:  Appearance: NAD. VS past 24 hrs -as above   HEENT:   Moist oral mucosa. Conjunctiva clear b/l.   Neck : supple  Respiratory: Lungs CTAB.  Gastrointestinal:  Soft, nontender. No rebound. No rigidity. BS present	  Cardiovascular: RRR ,S1S2 present  Neurologic: Non-focal. Moving all extremities.  Extremities: No edema. No erythema. No calf tenderness.  Skin: No rashes, No ecchymoses, No cyanosis.	  wounds ,skin lesions-See skin assesment flow sheet   LABS:          CAPILLARY BLOOD GLUCOSE              Culture - Blood (collected 02 Feb 2020 11:31)  Source: .Blood Blood  Preliminary Report (03 Feb 2020 12:01):    No growth to date.    Culture - Blood (collected 02 Feb 2020 11:31)  Source: .Blood Blood  Preliminary Report (03 Feb 2020 12:01):    No growth to date.    Culture - Blood (collected 01 Feb 2020 12:05)  Source: .Blood Blood  Final Report (06 Feb 2020 13:01):    No growth at 5 days.    Culture - Blood (collected 30 Jan 2020 16:55)  Source: .Blood Blood-Peripheral  Gram Stain (31 Jan 2020 05:17):    Growth in aerobic bottle:    Gram Negative Rods    Growth in anaerobic bottle:    Gram Negative Rods  Final Report (01 Feb 2020 16:34):    Growth in aerobic and anaerobic bottles: Escherichia coli ESBL    ***Blood Panel PCR results on this specimen are available    approximately 3 hours after the Gram stain result.***    Gram stain, PCR, and/or culture results may not always    correspond dueto difference in methodologies.    ************************************************************    This PCR assay was performed using [employer].    The following targets are tested for: Enterococcus,    vancomycin resistant enterococci, Listeria monocytogenes,    coagulase negative staphylococci, S. aureus,    methicillin resistant S. aureus, Streptococcus agalactiae    (Group B), S. pneumoniae, S. pyogenes (Group A),    Acinetobacter baumannii, Enterobacter cloacae, E. coli,    Klebsiella oxytoca, K. pneumoniae, Proteus sp.,    Serratia marcescens, Haemophilus influenzae,    Neisseria meningitidis, Pseudomonas aeruginosa, Candida    albicans, C. glabrata, C krusei, C parapsilosis,    C. tropicalis and the KPC resistance gene.  Organism: Blood Culture PCR  Escherichia coli ESBL (01 Feb 2020 16:34)  Organism: Escherichia coli ESBL (01 Feb 2020 16:34)  Organism: Blood Culture PCR (01 Feb 2020 16:34)    Culture - Blood (collected 30 Jan 2020 16:42)  Source: .Blood Blood-Peripheral  Gram Stain (31 Jan 2020 04:28):    Growth in aerobic bottle:    Gram Negative Rods    Growth in anaerobic bottle:    Gram Negative Rods  Final Report (01 Feb 2020 16:54):    Growth in aerobic and anaerobic bottles: Escherichia coli ESBL    See previous culture [id number]    Culture - Urine (collected 30 Jan 2020 16:36)  Source: .Urine Catheterized  Final Report (01 Feb 2020 19:45):    >100,000 CFU/ml Escherichia coli ESBL  Organism: Escherichia coli ESBL (01 Feb 2020 19:45)  Organism: Escherichia coli ESBL (01 Feb 2020 19:45)      RADIOLOGY & ADDITIONAL TESTS:   reviewed elctronically  ASSESSMENT/PLAN: 	    45minutes spent on this visit, 50% visit time spent in care co-ordination with other attendings and counselling patient  I have discussed care plan with patient and HCP,expressed understanding of problems treatment and their effect and side effects, alternatives in detail,I have asked if they have any questions and concerns and appropriately addressed them to best of my ability

## 2020-02-06 NOTE — CHART NOTE - NSCHARTNOTEFT_GEN_A_CORE
Called by RN for elevated /68.    Note this patient is on comfort care and extensive BP medications at this time.      Patient seen sleeping comfortably at bedtime, received 100 mg PO hydralazine at approximatley 21:00. RN reports uncertainty regarding BP as patient was aggravated and moving during measurements, and attempts were made to measure BP on leg.    -Give STAT  mg hydralazine now  -follow up BP in 1 hour  -Will not give IM hydralazine despite elevated BP as patient resting comfortably, on comfort care  -If BP remains high, will contact family for their preference on management  -Will continue to monitor  -RN to notify if any changes

## 2020-02-06 NOTE — PROGRESS NOTE ADULT - SUBJECTIVE AND OBJECTIVE BOX
Patient is a 89y Female with a known history of :  Palliative care encounter (Z51.5)  Dementia (F03.90)  Goals of care, counseling/discussion (Z71.89)  Fever (R50.9)  Prophylactic measure (Z29.9)  Delusional disorder (F22)  Constipation (K59.00)  Anxiety disorder (F41.9)  Hypertension (I10)  Asthma (J45.909)  HENRIK (acute kidney injury) (N17.9)  Sepsis (A41.9)  UTI (urinary tract infection) (N39.0)  Acute febrile illness (R50.9)    HPI:  89 yo  , [facility] resident with medical hx of  Anxiety disorder  ,Asthma  ,Constipation  ,Delusional disorder  ,Dementia  ,Hyperlipidemia    Hypertension. bib ems for fever. No further hx available due to dementia  .Urosepsis vs aspiration pneumonia suspected Admitted for septic workup and evaluation ,send blood and urine cx,serial lactate levels ,monitor vitals closely hydration ,monitor urine output and renal profile ,iv abx initiated. Pulmonology evaluation obtained Patient found to have lactate elevation and septic workup was sent Palliative care consult requested ,to discuss advance directives and complete MOLST (30 Jan 2020 12:43)      REVIEW OF SYSTEMS:    CONSTITUTIONAL: No fever, weight loss, or fatigue  EYES: No eye pain, visual disturbances, or discharge  ENMT:  No difficulty hearing, tinnitus, vertigo; No sinus or throat pain  NECK: No pain or stiffness  BREASTS: No pain, masses, or nipple discharge  RESPIRATORY: No cough, wheezing, chills or hemoptysis; No shortness of breath  CARDIOVASCULAR: No chest pain, palpitations, dizziness, or leg swelling  GASTROINTESTINAL: No abdominal or epigastric pain. No nausea, vomiting, or hematemesis; No diarrhea or constipation. No melena or hematochezia.  GENITOURINARY: No dysuria, frequency, hematuria, or incontinence  NEUROLOGICAL: No headaches, memory loss, loss of strength, numbness, or tremors  SKIN: No itching, burning, rashes, or lesions   LYMPH NODES: No enlarged glands  ENDOCRINE: No heat or cold intolerance; No hair loss  MUSCULOSKELETAL: No joint pain or swelling; No muscle, back, or extremity pain  PSYCHIATRIC: No depression, anxiety, mood swings, or difficulty sleeping  HEME/LYMPH: No easy bruising, or bleeding gums  ALLERGY AND IMMUNOLOGIC: No hives or eczema    MEDICATIONS  (STANDING):  amLODIPine   Tablet 10 milliGRAM(s) Oral daily  cloNIDine 0.1 milliGRAM(s) Oral every 8 hours  cloNIDine Patch 0.1 mG/24Hr(s) 1 patch Transdermal every 7 days  hydrALAZINE 100 milliGRAM(s) Oral every 8 hours  hydrochlorothiazide 12.5 milliGRAM(s) Oral daily  metoprolol succinate ER 50 milliGRAM(s) Oral two times a day  polyethylene glycol 3350 17 Gram(s) Oral two times a day  QUEtiapine 25 milliGRAM(s) Oral two times a day  senna 2 Tablet(s) Oral at bedtime    MEDICATIONS  (PRN):  ALPRAZolam 0.25 milliGRAM(s) Oral two times a day PRN agitation  haloperidol    Injectable 0.5 milliGRAM(s) IntraMuscular every 6 hours PRN Agitation ,delusions  morphine Concentrate 5 milliGRAM(s) Oral every 3 hours PRN dyspnea, pain, distress, suffering      ALLERGIES: aspirin (Anaphylaxis)  aspirin (Unknown)  mangoes (Unknown)  mangos (Unknown)  shellfish (Unknown)      FAMILY HISTORY:  No pertinent family history in first degree relatives      PHYSICAL EXAMINATION:  -----------------------------  T(C): 36.7 (02-06-20 @ 05:17), Max: 36.8 (02-06-20 @ 00:08)  HR: 90 (02-06-20 @ 07:26) (64 - 90)  BP: 122/73 (02-06-20 @ 07:26) (122/73 - 230/90)  RR: 20 (02-06-20 @ 05:17) (20 - 20)  SpO2: 96% (02-06-20 @ 05:17) (96% - 96%)  Wt(kg): --    02-05 @ 07:01  -  02-06 @ 07:00  --------------------------------------------------------  IN:    Oral Fluid: 720 mL  Total IN: 720 mL    OUT:  Total OUT: 0 mL    Total NET: 720 mL            Constitutional: well developed, normal appearance, well groomed, well nourished, no deformities and no acute distress.   Eyes: the conjunctiva exhibited no abnormalities and the eyelids demonstrated no xanthelasmas.   HEENT: normal oral mucosa, no oral pallor and no oral cyanosis.   Neck: normal jugular venous A waves present, normal jugular venous V waves present and no jugular venous walsh A waves.   Pulmonary: no respiratory distress, normal respiratory rhythm and effort, no accessory muscle use and lungs were clear to auscultation bilaterally.   Cardiovascular: heart rate and rhythm were normal, normal S1 and S2 and no murmur, gallop, rub, heave or thrill are present.   Abdomen: soft, non-tender, no hepato-splenomegaly and no abdominal mass palpated.   Musculoskeletal: the gait could not be assessed..   Extremities: no clubbing of the fingernails, no localized cyanosis, no petechial hemorrhages and no ischemic changes.   Skin: normal skin color and pigmentation, no rash, no venous stasis, no skin lesions, no skin ulcer and no xanthoma was observed.   Psychiatric: oriented to person, place, and time, the affect was normal, the mood was normal and not feeling anxious.     LABS:   --------                     RADIOLOGY:  -----------------        ECG:

## 2020-02-06 NOTE — PROGRESS NOTE ADULT - ASSESSMENT
admitted with fever  uncontrolled hypertension   on norvasc ,hydralazine,b.b,anddiurtetic and clonodine - bp controlled  will no longer  follow  unless requested 2/6

## 2020-02-06 NOTE — GOALS OF CARE CONVERSATION - ADVANCED CARE PLANNING - CONVERSATION DETAILS
As per patient's daughter Syl patient was approved by [facility].
see addendum note: 2/4/2020   1205 hrs
spoke to pt daughter, Syl on phone, confirmed pt copy of molst: dnr, dni, no TF, wants IV fluids & antibiotics, contacted [facility] at [facility]: original molst present at facility, faxing update page with physician signatures. Dr Kim aware, order received.  PC RN will follow as needed

## 2020-02-06 NOTE — PROGRESS NOTE ADULT - SUBJECTIVE AND OBJECTIVE BOX
Patient is a 89y Female whom presented to the hospital with ckd and josue   pateint seen and examined nad , no fever , no chills    PAST MEDICAL & SURGICAL HISTORY:  Constipation  Asthma  Hyperlipidemia  Hypertension  Anxiety disorder  Delusional disorder  Dementia  No significant past surgical history      MEDICATIONS  (STANDING):  albuterol/ipratropium for Nebulization 3 milliLiter(s) Nebulizer every 6 hours  buDESOnide    Inhalation Suspension 0.5 milliGRAM(s) Inhalation two times a day  cefTRIAXone   IVPB 1000 milliGRAM(s) IV Intermittent every 24 hours  diVALproex Sprinkle 125 milliGRAM(s) Oral two times a day  famotidine    Tablet 20 milliGRAM(s) Oral daily  folic acid 1 milliGRAM(s) Oral daily  heparin  Injectable 5000 Unit(s) SubCutaneous every 12 hours  lactated ringers. 1000 milliLiter(s) (80 mL/Hr) IV Continuous <Continuous>  lactobacillus acidophilus 1 Tablet(s) Oral every 8 hours  levothyroxine 100 MICROGram(s) Oral daily  metoprolol succinate ER 50 milliGRAM(s) Oral two times a day  polyethylene glycol 3350 17 Gram(s) Oral two times a day  QUEtiapine 25 milliGRAM(s) Oral two times a day  senna 2 Tablet(s) Oral at bedtime      Allergies    aspirin (Anaphylaxis)  aspirin (Unknown)  mangoes (Unknown)  mangos (Unknown)  shellfish (Unknown)                       Intolerances        SOCIAL HISTORY:  Denies ETOh,Smoking,     FAMILY HISTORY:  No pertinent family history in first degree relatives      REVIEW OF SYSTEMS:    unable to obtained a good review system                                                                    CAPILLARY BLOOD GLUCOSE          Vital Signs Last 24 Hrs  T(C): 36.7 (06 Feb 2020 05:17), Max: 36.8 (06 Feb 2020 00:08)  T(F): 98 (06 Feb 2020 05:17), Max: 98.2 (06 Feb 2020 00:08)  HR: 63 (06 Feb 2020 14:06) (63 - 90)  BP: 186/70 (06 Feb 2020 14:06) (122/73 - 218/90)  BP(mean): --  RR: 20 (06 Feb 2020 05:17) (20 - 20)  SpO2: 96% (06 Feb 2020 05:17) (96% - 96%)                                     PHYSICAL EXAM:    Constitutional: NAD  HEENT: conjunctive   clear   Neck:  No JVD  Respiratory: decreases bs b/l   Cardiovascular: S1 and S2  Gastrointestinal: BS+, soft, NT/ND  Extremities: No peripheral edema  Neurological:  no focal deficits  Psychiatric: Normal mood, normal affect  Skin: dry   Access: Not applicable

## 2020-02-06 NOTE — PROGRESS NOTE ADULT - SUBJECTIVE AND OBJECTIVE BOX
Date/Time Patient Seen:  		  Referring MD:   Data Reviewed	       Patient is a 89y old  Female who presents with a chief complaint of FEVER (05 Feb 2020 20:59)      Subjective/HPI     PAST MEDICAL & SURGICAL HISTORY:  Constipation  Asthma  Hyperlipidemia  Hypertension  Anxiety disorder  Delusional disorder  Hyperlipidemia  Dementia  No significant past surgical history        Medication list         MEDICATIONS  (STANDING):  amLODIPine   Tablet 10 milliGRAM(s) Oral daily  cloNIDine 0.1 milliGRAM(s) Oral every 8 hours  cloNIDine Patch 0.1 mG/24Hr(s) 1 patch Transdermal every 7 days  hydrALAZINE 100 milliGRAM(s) Oral every 8 hours  hydrochlorothiazide 12.5 milliGRAM(s) Oral daily  metoprolol succinate ER 50 milliGRAM(s) Oral two times a day  polyethylene glycol 3350 17 Gram(s) Oral two times a day  QUEtiapine 25 milliGRAM(s) Oral two times a day  senna 2 Tablet(s) Oral at bedtime    MEDICATIONS  (PRN):  ALPRAZolam 0.25 milliGRAM(s) Oral two times a day PRN agitation  haloperidol    Injectable 0.5 milliGRAM(s) IntraMuscular every 6 hours PRN Agitation ,delusions  morphine Concentrate 5 milliGRAM(s) Oral every 3 hours PRN dyspnea, pain, distress, suffering         Vitals log        ICU Vital Signs Last 24 Hrs  T(C): 36.7 (06 Feb 2020 05:17), Max: 36.8 (06 Feb 2020 00:08)  T(F): 98 (06 Feb 2020 05:17), Max: 98.2 (06 Feb 2020 00:08)  HR: 90 (06 Feb 2020 07:26) (64 - 90)  BP: 122/73 (06 Feb 2020 07:26) (122/73 - 230/90)  BP(mean): --  ABP: --  ABP(mean): --  RR: 20 (06 Feb 2020 05:17) (20 - 20)  SpO2: 96% (06 Feb 2020 05:17) (96% - 96%)           Input and Output:  I&O's Detail    05 Feb 2020 07:01  -  06 Feb 2020 07:00  --------------------------------------------------------  IN:    Oral Fluid: 720 mL  Total IN: 720 mL    OUT:  Total OUT: 0 mL    Total NET: 720 mL          Lab Data                  Review of Systems	      Objective     Physical Examination    heart ss2  lung dec bs  abd soft  head nc  frail  weak  confused  on o2 support      Pertinent Lab findings & Imaging      Cally:  NO   Adequate UO     I&O's Detail    05 Feb 2020 07:01  -  06 Feb 2020 07:00  --------------------------------------------------------  IN:    Oral Fluid: 720 mL  Total IN: 720 mL    OUT:  Total OUT: 0 mL    Total NET: 720 mL               Discussed with:     Cultures:	        Radiology

## 2020-02-06 NOTE — GOALS OF CARE CONVERSATION - ADVANCED CARE PLANNING - NS PRO AD PATIENT TYPE
Health Care Proxy (HCP)/Medical Orders for Life-Sustaining Treatment (MOLST)/Living Will
Health Care Proxy (HCP)/Living Will/Medical Orders for Life-Sustaining Treatment (MOLST)
Living Will/Health Care Proxy (HCP)/Medical Orders for Life-Sustaining Treatment (MOLST)

## 2020-02-07 RX ORDER — HYDRALAZINE HCL 50 MG
1 TABLET ORAL
Qty: 90 | Refills: 0
Start: 2020-02-07 | End: 2020-03-07

## 2020-02-07 RX ORDER — LEVOTHYROXINE SODIUM 125 MCG
1 TABLET ORAL
Qty: 30 | Refills: 0
Start: 2020-02-07 | End: 2020-03-07

## 2020-02-07 RX ORDER — ACETAMINOPHEN 500 MG
2 TABLET ORAL
Qty: 0 | Refills: 0 | DISCHARGE

## 2020-02-07 RX ADMIN — Medication 100 MILLIGRAM(S): at 14:47

## 2020-02-07 RX ADMIN — Medication 1 PATCH: at 19:24

## 2020-02-07 RX ADMIN — SENNA PLUS 2 TABLET(S): 8.6 TABLET ORAL at 21:46

## 2020-02-07 RX ADMIN — QUETIAPINE FUMARATE 25 MILLIGRAM(S): 200 TABLET, FILM COATED ORAL at 17:20

## 2020-02-07 RX ADMIN — Medication 0.1 MILLIGRAM(S): at 21:46

## 2020-02-07 RX ADMIN — Medication 12.5 MILLIGRAM(S): at 05:19

## 2020-02-07 RX ADMIN — POLYETHYLENE GLYCOL 3350 17 GRAM(S): 17 POWDER, FOR SOLUTION ORAL at 05:19

## 2020-02-07 RX ADMIN — Medication 0.1 MILLIGRAM(S): at 14:47

## 2020-02-07 RX ADMIN — Medication 100 MILLIGRAM(S): at 05:19

## 2020-02-07 RX ADMIN — QUETIAPINE FUMARATE 25 MILLIGRAM(S): 200 TABLET, FILM COATED ORAL at 05:19

## 2020-02-07 RX ADMIN — Medication 0.1 MILLIGRAM(S): at 05:19

## 2020-02-07 RX ADMIN — POLYETHYLENE GLYCOL 3350 17 GRAM(S): 17 POWDER, FOR SOLUTION ORAL at 17:20

## 2020-02-07 RX ADMIN — Medication 1 PATCH: at 07:15

## 2020-02-07 RX ADMIN — AMLODIPINE BESYLATE 10 MILLIGRAM(S): 2.5 TABLET ORAL at 05:19

## 2020-02-07 RX ADMIN — Medication 100 MILLIGRAM(S): at 21:46

## 2020-02-07 NOTE — PROGRESS NOTE ADULT - PROBLEM SELECTOR PLAN 7
continue current management and present  medications ,supportive care

## 2020-02-07 NOTE — PROGRESS NOTE ADULT - PROBLEM SELECTOR PLAN 10
Gastrointestinal stress ulcer prophylaxis and DVT prophylaxis administrated

## 2020-02-07 NOTE — PROGRESS NOTE ADULT - PROBLEM SELECTOR PROBLEM 9
Delusional disorder

## 2020-02-07 NOTE — ADVANCED PRACTICE NURSE CONSULT - ASSESSMENT
patient is a 89yo female admitted for fever of unknown origin presents with a deep tissue pressure injury at left hallux lateral position 1 x .3 periwound no erythema, no s/s discomfort/pain

## 2020-02-07 NOTE — PROGRESS NOTE ADULT - NSHPATTENDINGPLANDISCUSS_GEN_ALL_CORE
med staff ,Dr Barrera , Dr Benavides , Dr Romero , [facility] care team
med staff ,Dr Barrera , Dr Benavides , Dr Romero , [facility] care team
med staff ,Dr Barrera , Dr Benavides , Dr Romero , [facility] care team
med staff ,Dr Barrera , Dr Benavides , Dr Romero ,Dr Olmos , , pcp Dr Dooley ,daughter Kym ,  [facility] care team
med staff ,Dr Barrera , Dr Benavides , Dr Romero ,Dr Olmos , , pcp Dr Dooley ,daughter Kym ,  [facility] care team
med staff ,Dr Barrera , Dr Benavides , Dr Romero ,Dr Olmos , , pcp Dr Dooley ,daughter Kym ,  [facility] care team
med staff ,Dr Barrera , Dr Benavides , Dr Romero ,Dr Olmos , , pcp Dr Dooley ,daughter Kym ,  [facility] care team

## 2020-02-07 NOTE — PROGRESS NOTE ADULT - SUBJECTIVE AND OBJECTIVE BOX
Patient is a 89y Female whom presented to the hospital with ckd and josue   pateint seen and examined ,     PAST MEDICAL & SURGICAL HISTORY:  Constipation  Asthma  Hyperlipidemia  Hypertension  Anxiety disorder  Delusional disorder  Dementia  No significant past surgical history      MEDICATIONS  (STANDING):  albuterol/ipratropium for Nebulization 3 milliLiter(s) Nebulizer every 6 hours  buDESOnide    Inhalation Suspension 0.5 milliGRAM(s) Inhalation two times a day  cefTRIAXone   IVPB 1000 milliGRAM(s) IV Intermittent every 24 hours  diVALproex Sprinkle 125 milliGRAM(s) Oral two times a day  famotidine    Tablet 20 milliGRAM(s) Oral daily  folic acid 1 milliGRAM(s) Oral daily  heparin  Injectable 5000 Unit(s) SubCutaneous every 12 hours  lactated ringers. 1000 milliLiter(s) (80 mL/Hr) IV Continuous <Continuous>  lactobacillus acidophilus 1 Tablet(s) Oral every 8 hours  levothyroxine 100 MICROGram(s) Oral daily  metoprolol succinate ER 50 milliGRAM(s) Oral two times a day  polyethylene glycol 3350 17 Gram(s) Oral two times a day  QUEtiapine 25 milliGRAM(s) Oral two times a day  senna 2 Tablet(s) Oral at bedtime      Allergies    aspirin (Anaphylaxis)  aspirin (Unknown)  mangoes (Unknown)  mangos (Unknown)  shellfish (Unknown)                       Intolerances        SOCIAL HISTORY:  Denies ETOh,Smoking,     FAMILY HISTORY:  No pertinent family history in first degree relatives      REVIEW OF SYSTEMS:    unable to obtained a good review system                                                                    CAPILLARY BLOOD GLUCOSE                        CAPILLARY BLOOD GLUCOSE          Vital Signs Last 24 Hrs  T(C): 36.3 (07 Feb 2020 08:29), Max: 37.1 (06 Feb 2020 16:09)  T(F): 97.4 (07 Feb 2020 08:29), Max: 98.8 (06 Feb 2020 16:09)  HR: 61 (07 Feb 2020 08:29) (58 - 70)  BP: 136/64 (07 Feb 2020 08:29) (124/67 - 181/77)  BP(mean): --  RR: 19 (07 Feb 2020 08:29) (19 - 24)  SpO2: 86% (07 Feb 2020 10:29) (86% - 96%)                                         PHYSICAL EXAM:    Constitutional: NAD  HEENT: conjunctive   clear   Neck:  No JVD  Respiratory: decreases bs b/l   Cardiovascular: S1 and S2  Gastrointestinal: BS+, soft, NT/ND  Extremities: No peripheral edema  Neurological:  no focal deficits  Psychiatric: Normal mood, normal affect  Skin: dry   Access: Not applicable

## 2020-02-07 NOTE — ADVANCED PRACTICE NURSE CONSULT - RECOMMEDATIONS
1. Off load with z-floats and have on at all times while in bed (z-floats are on)  2. Paint with cavilon daily  RN educated in the care of this patients skin care

## 2020-02-07 NOTE — PROGRESS NOTE ADULT - SUBJECTIVE AND OBJECTIVE BOX
Date/Time Patient Seen:  		  Referring MD:   Data Reviewed	       Patient is a 89y old  Female who presents with a chief complaint of FEVER (06 Feb 2020 15:26)      Subjective/HPI     PAST MEDICAL & SURGICAL HISTORY:  Constipation  Asthma  Hyperlipidemia  Hypertension  Anxiety disorder  Delusional disorder  Hyperlipidemia  Dementia  No significant past surgical history        Medication list         MEDICATIONS  (STANDING):  amLODIPine   Tablet 10 milliGRAM(s) Oral daily  cloNIDine 0.1 milliGRAM(s) Oral every 8 hours  cloNIDine Patch 0.1 mG/24Hr(s) 1 patch Transdermal every 7 days  hydrALAZINE 100 milliGRAM(s) Oral every 8 hours  hydrochlorothiazide 12.5 milliGRAM(s) Oral daily  metoprolol succinate ER 50 milliGRAM(s) Oral two times a day  polyethylene glycol 3350 17 Gram(s) Oral two times a day  QUEtiapine 25 milliGRAM(s) Oral two times a day  senna 2 Tablet(s) Oral at bedtime    MEDICATIONS  (PRN):  haloperidol    Injectable 0.5 milliGRAM(s) IntraMuscular every 6 hours PRN Agitation ,delusions  morphine Concentrate 5 milliGRAM(s) Oral every 3 hours PRN dyspnea, pain, distress, suffering         Vitals log        ICU Vital Signs Last 24 Hrs  T(C): 37.1 (06 Feb 2020 16:09), Max: 37.1 (06 Feb 2020 16:09)  T(F): 98.8 (06 Feb 2020 16:09), Max: 98.8 (06 Feb 2020 16:09)  HR: 58 (07 Feb 2020 05:15) (58 - 90)  BP: 181/77 (07 Feb 2020 05:15) (122/73 - 186/70)  BP(mean): --  ABP: --  ABP(mean): --  RR: 24 (06 Feb 2020 16:09) (24 - 24)  SpO2: 95% (06 Feb 2020 16:09) (95% - 95%)           Input and Output:  I&O's Detail    05 Feb 2020 07:01  -  06 Feb 2020 07:00  --------------------------------------------------------  IN:    Oral Fluid: 720 mL  Total IN: 720 mL    OUT:  Total OUT: 0 mL    Total NET: 720 mL      06 Feb 2020 07:01  -  07 Feb 2020 06:16  --------------------------------------------------------  IN:  Total IN: 0 mL    OUT:    Voided: 200 mL  Total OUT: 200 mL    Total NET: -200 mL          Lab Data                  Review of Systems	      Objective     Physical Examination    heart s1s2  lung dec BS  abd soft  head nc  head at  on room air      Pertinent Lab findings & Imaging      Cally:  NO   Adequate UO     I&O's Detail    05 Feb 2020 07:01  -  06 Feb 2020 07:00  --------------------------------------------------------  IN:    Oral Fluid: 720 mL  Total IN: 720 mL    OUT:  Total OUT: 0 mL    Total NET: 720 mL      06 Feb 2020 07:01  -  07 Feb 2020 06:16  --------------------------------------------------------  IN:  Total IN: 0 mL    OUT:    Voided: 200 mL  Total OUT: 200 mL    Total NET: -200 mL               Discussed with:     Cultures:	        Radiology

## 2020-02-07 NOTE — PROGRESS NOTE ADULT - PROBLEM SELECTOR PLAN 6
continue home medications

## 2020-02-07 NOTE — PROGRESS NOTE ADULT - PROBLEM SELECTOR PROBLEM 8
Constipation

## 2020-02-07 NOTE — PROGRESS NOTE ADULT - PROBLEM SELECTOR PLAN 8
bowel regimen

## 2020-02-07 NOTE — PROGRESS NOTE ADULT - SUBJECTIVE AND OBJECTIVE BOX
PROGRESS NOTE  Patient is a 89y old  Female who presents with a chief complaint of FEVER (07 Feb 2020 16:06)    Chart and available morning labs /imaging are reviewed electronically , urgent issues addressed . More information  is being added upon completion of rounds , when more information is collected and management discussed with consultants , medical staff and social service/case management on the floor LATE ENTRY- patient was seen and examined earlier today . Plan of care was discussed with med staff and unit coordinator .   OVERNIGHT  No new issues reported by medical staff . All above noted Patient is resting in a bed comfortably .Confused ,poor mentation .No distress noted     HPI:  89 yo  , [facility] resident with medical hx of  Anxiety disorder  ,Asthma  ,Constipation  ,Delusional disorder  ,Dementia  ,Hyperlipidemia    Hypertension. bib ems for fever. No further hx available due to dementia  .Urosepsis vs aspiration pneumonia suspected Admitted for septic workup and evaluation ,send blood and urine cx,serial lactate levels ,monitor vitals closely hydration ,monitor urine output and renal profile ,iv abx initiated. Pulmonology evaluation obtained Patient found to have lactate elevation and septic workup was sent Palliative care consult requested ,to discuss advance directives and complete MOLST (30 Jan 2020 12:43)    PAST MEDICAL & SURGICAL HISTORY:  Constipation  Asthma  Hyperlipidemia  Hypertension  Anxiety disorder  Delusional disorder  Dementia  No significant past surgical history      MEDICATIONS  (STANDING):  amLODIPine   Tablet 10 milliGRAM(s) Oral daily  cloNIDine 0.1 milliGRAM(s) Oral every 8 hours  cloNIDine Patch 0.1 mG/24Hr(s) 1 patch Transdermal every 7 days  hydrALAZINE 100 milliGRAM(s) Oral every 8 hours  hydrochlorothiazide 12.5 milliGRAM(s) Oral daily  metoprolol succinate ER 50 milliGRAM(s) Oral two times a day  polyethylene glycol 3350 17 Gram(s) Oral two times a day  QUEtiapine 25 milliGRAM(s) Oral two times a day  senna 2 Tablet(s) Oral at bedtime    MEDICATIONS  (PRN):  haloperidol    Injectable 0.5 milliGRAM(s) IntraMuscular every 6 hours PRN Agitation ,delusions  morphine Concentrate 5 milliGRAM(s) Oral every 3 hours PRN dyspnea, pain, distress, suffering      OBJECTIVE    T(C): 36.3 (02-07-20 @ 08:29), Max: 36.3 (02-07-20 @ 08:29)  HR: 61 (02-07-20 @ 08:29) (58 - 70)  BP: 136/64 (02-07-20 @ 08:29) (136/64 - 181/77)  RR: 19 (02-07-20 @ 08:29) (19 - 19)  SpO2: 86% (02-07-20 @ 10:29) (86% - 96%)  Wt(kg): --  I&O's Summary    06 Feb 2020 07:01  -  07 Feb 2020 07:00  --------------------------------------------------------  IN: 0 mL / OUT: 400 mL / NET: -400 mL    07 Feb 2020 07:01  -  07 Feb 2020 17:10  --------------------------------------------------------  IN: 0 mL / OUT: 300 mL / NET: -300 mL          REVIEW OF SYSTEMS:  ROS is unobtainable due to dementia and confusion     PHYSICAL EXAM:  Appearance: NAD. VS past 24 hrs -as above   HEENT:   Moist oral mucosa. Conjunctiva clear b/l.   Neck : supple  Respiratory: Lungs CTAB.  Gastrointestinal:  Soft, nontender. No rebound. No rigidity. BS present	  Cardiovascular: RRR ,S1S2 present  Neurologic: Non-focal. Moving all extremities.  Extremities: No edema. No erythema. No calf tenderness.  Skin: No rashes, No ecchymoses, No cyanosis.	  wounds ,skin lesions-See skin assesment flow sheet   LABS:          CAPILLARY BLOOD GLUCOSE              Culture - Blood (collected 02 Feb 2020 11:31)  Source: .Blood Blood  Final Report (07 Feb 2020 12:00):    No growth at 5 days.    Culture - Blood (collected 02 Feb 2020 11:31)  Source: .Blood Blood  Final Report (07 Feb 2020 12:00):    No growth at 5 days.    Culture - Blood (collected 01 Feb 2020 12:05)  Source: .Blood Blood  Final Report (06 Feb 2020 13:01):    No growth at 5 days.    Culture - Blood (collected 30 Jan 2020 16:55)  Source: .Blood Blood-Peripheral  Gram Stain (31 Jan 2020 05:17):    Growth in aerobic bottle:    Gram Negative Rods    Growth in anaerobic bottle:    Gram Negative Rods  Final Report (01 Feb 2020 16:34):    Growth in aerobic and anaerobic bottles: Escherichia coli ESBL    ***Blood Panel PCR results on this specimen are available    approximately 3 hours after the Gram stain result.***    Gram stain, PCR, and/or culture results may not always    correspond dueto difference in methodologies.    ************************************************************    This PCR assay was performed using [employer].    The following targets are tested for: Enterococcus,    vancomycin resistant enterococci, Listeria monocytogenes,    coagulase negative staphylococci, S. aureus,    methicillin resistant S. aureus, Streptococcus agalactiae    (Group B), S. pneumoniae, S. pyogenes (Group A),    Acinetobacter baumannii, Enterobacter cloacae, E. coli,    Klebsiella oxytoca, K. pneumoniae, Proteus sp.,    Serratia marcescens, Haemophilus influenzae,    Neisseria meningitidis, Pseudomonas aeruginosa, Candida    albicans, C. glabrata, C krusei, C parapsilosis,    C. tropicalis and the KPC resistance gene.  Organism: Blood Culture PCR  Escherichia coli ESBL (01 Feb 2020 16:34)  Organism: Escherichia coli ESBL (01 Feb 2020 16:34)  Organism: Blood Culture PCR (01 Feb 2020 16:34)    Culture - Blood (collected 30 Jan 2020 16:42)  Source: .Blood Blood-Peripheral  Gram Stain (31 Jan 2020 04:28):    Growth in aerobic bottle:    Gram Negative Rods    Growth in anaerobic bottle:    Gram Negative Rods  Final Report (01 Feb 2020 16:54):    Growth in aerobic and anaerobic bottles: Escherichia coli ESBL    See previous culture [id number]    Culture - Urine (collected 30 Jan 2020 16:36)  Source: .Urine Catheterized  Final Report (01 Feb 2020 19:45):    >100,000 CFU/ml Escherichia coli ESBL  Organism: Escherichia coli ESBL (01 Feb 2020 19:45)  Organism: Escherichia coli ESBL (01 Feb 2020 19:45)      RADIOLOGY & ADDITIONAL TESTS:   reviewed elctronically  ASSESSMENT/PLAN: 	    Patient was seen and examined on a day of discharge . Plan of care , discharge medications and recommendations discussed with consultants and clearance for discharge obtained .Social service , case management  and medical staff are aware of plan. Family is notified. Discharge summary  is  prepared electronically 75minutes spent on this visit, 50% visit time spent in care co-ordination with other attendings and counselling patient  I have discussed care plan with patient and HCP,expressed understanding of problems treatment and their effect and side effects, alternatives in detail,I have asked if they have any questions and concerns and appropriately addressed them to best of my ability

## 2020-02-08 VITALS — DIASTOLIC BLOOD PRESSURE: 76 MMHG | SYSTOLIC BLOOD PRESSURE: 176 MMHG | HEART RATE: 56 BPM

## 2020-02-08 PROCEDURE — 87186 SC STD MICRODIL/AGAR DIL: CPT

## 2020-02-08 PROCEDURE — 97161 PT EVAL LOW COMPLEX 20 MIN: CPT

## 2020-02-08 PROCEDURE — 71045 X-RAY EXAM CHEST 1 VIEW: CPT

## 2020-02-08 PROCEDURE — 94760 N-INVAS EAR/PLS OXIMETRY 1: CPT

## 2020-02-08 PROCEDURE — 93005 ELECTROCARDIOGRAM TRACING: CPT

## 2020-02-08 PROCEDURE — 80053 COMPREHEN METABOLIC PANEL: CPT

## 2020-02-08 PROCEDURE — 87633 RESP VIRUS 12-25 TARGETS: CPT

## 2020-02-08 PROCEDURE — 97110 THERAPEUTIC EXERCISES: CPT

## 2020-02-08 PROCEDURE — 97530 THERAPEUTIC ACTIVITIES: CPT

## 2020-02-08 PROCEDURE — 94640 AIRWAY INHALATION TREATMENT: CPT

## 2020-02-08 PROCEDURE — 83605 ASSAY OF LACTIC ACID: CPT

## 2020-02-08 PROCEDURE — 87631 RESP VIRUS 3-5 TARGETS: CPT

## 2020-02-08 PROCEDURE — 85730 THROMBOPLASTIN TIME PARTIAL: CPT

## 2020-02-08 PROCEDURE — 87086 URINE CULTURE/COLONY COUNT: CPT

## 2020-02-08 PROCEDURE — 85610 PROTHROMBIN TIME: CPT

## 2020-02-08 PROCEDURE — 81001 URINALYSIS AUTO W/SCOPE: CPT

## 2020-02-08 PROCEDURE — 85027 COMPLETE CBC AUTOMATED: CPT

## 2020-02-08 PROCEDURE — 36415 COLL VENOUS BLD VENIPUNCTURE: CPT

## 2020-02-08 PROCEDURE — 82803 BLOOD GASES ANY COMBINATION: CPT

## 2020-02-08 PROCEDURE — 92610 EVALUATE SWALLOWING FUNCTION: CPT

## 2020-02-08 PROCEDURE — 96374 THER/PROPH/DIAG INJ IV PUSH: CPT

## 2020-02-08 PROCEDURE — 87798 DETECT AGENT NOS DNA AMP: CPT

## 2020-02-08 PROCEDURE — 99285 EMERGENCY DEPT VISIT HI MDM: CPT | Mod: 25

## 2020-02-08 PROCEDURE — 87581 M.PNEUMON DNA AMP PROBE: CPT

## 2020-02-08 PROCEDURE — 84100 ASSAY OF PHOSPHORUS: CPT

## 2020-02-08 PROCEDURE — 87040 BLOOD CULTURE FOR BACTERIA: CPT

## 2020-02-08 PROCEDURE — 96375 TX/PRO/DX INJ NEW DRUG ADDON: CPT

## 2020-02-08 PROCEDURE — 87486 CHLMYD PNEUM DNA AMP PROBE: CPT

## 2020-02-08 PROCEDURE — 87449 NOS EACH ORGANISM AG IA: CPT

## 2020-02-08 PROCEDURE — 87150 DNA/RNA AMPLIFIED PROBE: CPT

## 2020-02-08 PROCEDURE — 36600 WITHDRAWAL OF ARTERIAL BLOOD: CPT

## 2020-02-08 PROCEDURE — 80048 BASIC METABOLIC PNL TOTAL CA: CPT

## 2020-02-08 RX ORDER — METOPROLOL TARTRATE 50 MG
1 TABLET ORAL
Qty: 60 | Refills: 0
Start: 2020-02-08 | End: 2020-03-08

## 2020-02-08 RX ADMIN — Medication 12.5 MILLIGRAM(S): at 05:31

## 2020-02-08 RX ADMIN — Medication 100 MILLIGRAM(S): at 05:31

## 2020-02-08 RX ADMIN — POLYETHYLENE GLYCOL 3350 17 GRAM(S): 17 POWDER, FOR SOLUTION ORAL at 05:31

## 2020-02-08 RX ADMIN — Medication 0.1 MILLIGRAM(S): at 05:31

## 2020-02-08 RX ADMIN — QUETIAPINE FUMARATE 25 MILLIGRAM(S): 200 TABLET, FILM COATED ORAL at 05:31

## 2020-02-08 RX ADMIN — AMLODIPINE BESYLATE 10 MILLIGRAM(S): 2.5 TABLET ORAL at 05:31

## 2020-02-08 RX ADMIN — Medication 1 PATCH: at 07:36

## 2020-02-08 NOTE — PROGRESS NOTE ADULT - PROBLEM SELECTOR PROBLEM 1
Goals of care, counseling/discussion
Palliative care encounter
Acute febrile illness
HENRIK (acute kidney injury)

## 2020-02-08 NOTE — PROGRESS NOTE ADULT - REASON FOR ADMISSION
FEVER

## 2020-02-08 NOTE — PROGRESS NOTE ADULT - PROBLEM SELECTOR PLAN 4
BP monitoring,continue current antihypertensive meds, low salt diet,followup with PMD in 1-2 weeks
Admit for iv hydration,monitor renal profile and urine output,nutritionist consult,prealbumin level,serial bmp,nutritional supplements,multivitamins,palliative care evaluuation regarding MOLST completion and artificial nutrition discussion
BP monitoring,continue current antihypertensive meds, low salt diet,followup with PMD in 1-2 weeks
BP monitoring,continue current antihypertensive meds, low salt diet,followup with PMD in 1-2 weeks  not well controlled start   hydralazine 100 mg po q 8 hr   clonidine 0.1 mg po q 8 hr
Admit for iv hydration,monitor renal profile and urine output,nutritionist consult,prealbumin level,serial bmp,nutritional supplements,multivitamins,palliative care evaluuation regarding MOLST completion and artificial nutrition discussion

## 2020-02-08 NOTE — PROGRESS NOTE ADULT - PROBLEM SELECTOR PROBLEM 3
Sepsis
UTI (urinary tract infection)

## 2020-02-08 NOTE — PROGRESS NOTE ADULT - SUBJECTIVE AND OBJECTIVE BOX
Patient is a 89y Female whom presented to the hospital with ckd and josue   pateint seen and examined ,     PAST MEDICAL & SURGICAL HISTORY:  Constipation  Asthma  Hyperlipidemia  Hypertension  Anxiety disorder  Delusional disorder  Dementia  No significant past surgical history      MEDICATIONS  (STANDING):  albuterol/ipratropium for Nebulization 3 milliLiter(s) Nebulizer every 6 hours  buDESOnide    Inhalation Suspension 0.5 milliGRAM(s) Inhalation two times a day  cefTRIAXone   IVPB 1000 milliGRAM(s) IV Intermittent every 24 hours  diVALproex Sprinkle 125 milliGRAM(s) Oral two times a day  famotidine    Tablet 20 milliGRAM(s) Oral daily  folic acid 1 milliGRAM(s) Oral daily  heparin  Injectable 5000 Unit(s) SubCutaneous every 12 hours  lactated ringers. 1000 milliLiter(s) (80 mL/Hr) IV Continuous <Continuous>  lactobacillus acidophilus 1 Tablet(s) Oral every 8 hours  levothyroxine 100 MICROGram(s) Oral daily  metoprolol succinate ER 50 milliGRAM(s) Oral two times a day  polyethylene glycol 3350 17 Gram(s) Oral two times a day  QUEtiapine 25 milliGRAM(s) Oral two times a day  senna 2 Tablet(s) Oral at bedtime      Allergies    aspirin (Anaphylaxis)  aspirin (Unknown)  mangoes (Unknown)  mangos (Unknown)  shellfish (Unknown)                       Intolerances        SOCIAL HISTORY:  Denies ETOh,Smoking,     FAMILY HISTORY:  No pertinent family history in first degree relatives      REVIEW OF SYSTEMS:    unable to obtained a good review system                                                                    CAPILLARY BLOOD GLUCOSE                                  CAPILLARY BLOOD GLUCOSE          Vital Signs Last 24 Hrs  T(C): --  T(F): --  HR: 56 (08 Feb 2020 05:26) (56 - 59)  BP: 176/76 (08 Feb 2020 05:26) (176/76 - 178/72)  BP(mean): --  RR: --  SpO2: --                                   PHYSICAL EXAM:    Constitutional: NAD  HEENT: conjunctive   clear   Neck:  No JVD  Respiratory: decreases bs b/l   Cardiovascular: S1 and S2  Gastrointestinal: BS+, soft, NT/ND  Extremities: No peripheral edema  Neurological:  no focal deficits  Psychiatric: Normal mood, normal affect  Skin: dry   Access: Not applicable

## 2020-02-08 NOTE — PROGRESS NOTE ADULT - PROBLEM SELECTOR PLAN 2
Admit for septic workup and ID evaluation,send blood and urine cx,serial lactate levels,monitor vitals closley,ivfs hydration,monitor urine output and renal profile,iv abx as per id cons
RULED IN FOR SEPSIS 2/2 TO UTI WITH BACTEREMIA WITH ESBL E.COLI  -SEEN BY ID ,14 days of INVANZ recommended ,but daughter /HCP refused PICC placeemnt and iv abx She requests home hospice and [facility] care Palliative care consult requested ,to discuss advance directives and complete MOLST /initiate CMO
RULED IN FOR SEPSIS 2/2 TO UTI WITH BACTEREMIA WITH ESBL E.COLI  -SEEN BY ID ,14 days of INVANZ recommended ,but daughter /HCP refused PICC placeemnt and iv abx She requests home hospice and [facility] care Palliative care consult requested ,to discuss advance directives and complete MOLST /initiate CMO
RULED IN FOR SEPSIS 2/2 TO UTI WITH BACTEREMIA WITH ESBL E.COLI  -SEEN BY ID ,14 days of INVANZ recommended ,but daughter /HCP refused PICC placeemnt and iv abx She requests home hospice and [facility] care Palliative care consult requested ,to discuss advance directives and complete MOLST /initiate CMO
RULED IN FOR SEPSIS 2/2 TO UTI WITH BACTEREMIA WITH ESBL E.COLI  -SEEN BY ID ,14 days of INVANZ recommended ,but daughter /HCP refused PICC placeemnt and iv abx She requests home hospice and [facility] care Palliative care consult requested ,to discuss advance directives and complete MOLST /initiate CMO
RULED IN FOR SEPSIS 2/2 TO UTI WITH BACTEREMIA WITH ESBL E.COLI  -SEEN BY ID ,14 days of INVANZ recommended ,but daughter /HCP refused PICC placeemnt and iv abx She requests home hospice and hospitals care Palliative care consult requested ,to discuss advance directives and complete MOLST /initiate CMO
send blood and urine cx,serial lactate levels,monitor vitals closley,ivfs hydration,monitor urine output and renal profile,iv abx as per id cons
send ua and urine cx ,started on ceftriaxone ,ID CONS called
Admit for septic workup and ID evaluation,send blood and urine cx,serial lactate levels,monitor vitals closley,ivfs hydration,monitor urine output and renal profile,iv abx as per id cons

## 2020-02-08 NOTE — PROGRESS NOTE ADULT - PROBLEM SELECTOR PROBLEM 4
HENRIK (acute kidney injury)
Hypertension

## 2020-02-08 NOTE — PROGRESS NOTE ADULT - PROBLEM SELECTOR PROBLEM 2
Sepsis
UTI (urinary tract infection)
Sepsis

## 2020-02-08 NOTE — PROGRESS NOTE ADULT - SUBJECTIVE AND OBJECTIVE BOX
Date/Time Patient Seen:  		  Referring MD:   Data Reviewed	       Patient is a 89y old  Female who presents with a chief complaint of FEVER (07 Feb 2020 16:06)      Subjective/HPI     PAST MEDICAL & SURGICAL HISTORY:  Constipation  Asthma  Hyperlipidemia  Hypertension  Anxiety disorder  Delusional disorder  Hyperlipidemia  Dementia  No significant past surgical history        Medication list         MEDICATIONS  (STANDING):  amLODIPine   Tablet 10 milliGRAM(s) Oral daily  cloNIDine 0.1 milliGRAM(s) Oral every 8 hours  cloNIDine Patch 0.1 mG/24Hr(s) 1 patch Transdermal every 7 days  hydrALAZINE 100 milliGRAM(s) Oral every 8 hours  hydrochlorothiazide 12.5 milliGRAM(s) Oral daily  metoprolol succinate ER 50 milliGRAM(s) Oral two times a day  polyethylene glycol 3350 17 Gram(s) Oral two times a day  QUEtiapine 25 milliGRAM(s) Oral two times a day  senna 2 Tablet(s) Oral at bedtime    MEDICATIONS  (PRN):  haloperidol    Injectable 0.5 milliGRAM(s) IntraMuscular every 6 hours PRN Agitation ,delusions  morphine Concentrate 5 milliGRAM(s) Oral every 3 hours PRN dyspnea, pain, distress, suffering         Vitals log        ICU Vital Signs Last 24 Hrs  T(C): 36.3 (07 Feb 2020 08:29), Max: 36.3 (07 Feb 2020 08:29)  T(F): 97.4 (07 Feb 2020 08:29), Max: 97.4 (07 Feb 2020 08:29)  HR: 56 (08 Feb 2020 05:26) (56 - 61)  BP: 176/76 (08 Feb 2020 05:26) (136/64 - 178/72)  BP(mean): --  ABP: --  ABP(mean): --  RR: 19 (07 Feb 2020 08:29) (19 - 19)  SpO2: 86% (07 Feb 2020 10:29) (86% - 96%)           Input and Output:  I&O's Detail    06 Feb 2020 07:01  -  07 Feb 2020 07:00  --------------------------------------------------------  IN:  Total IN: 0 mL    OUT:    Voided: 400 mL  Total OUT: 400 mL    Total NET: -400 mL      07 Feb 2020 07:01  -  08 Feb 2020 06:31  --------------------------------------------------------  IN:  Total IN: 0 mL    OUT:    Voided: 850 mL  Total OUT: 850 mL    Total NET: -850 mL          Lab Data                  Review of Systems	      Objective     Physical Examination    heart s1s2  lung dec BS  abd soft  head nc  head at  frail  weak      Pertinent Lab findings & Imaging      Cally:  NO   Adequate UO     I&O's Detail    06 Feb 2020 07:01  -  07 Feb 2020 07:00  --------------------------------------------------------  IN:  Total IN: 0 mL    OUT:    Voided: 400 mL  Total OUT: 400 mL    Total NET: -400 mL      07 Feb 2020 07:01  -  08 Feb 2020 06:31  --------------------------------------------------------  IN:  Total IN: 0 mL    OUT:    Voided: 850 mL  Total OUT: 850 mL    Total NET: -850 mL               Discussed with:     Cultures:	        Radiology

## 2020-02-08 NOTE — PROGRESS NOTE ADULT - ATTENDING COMMENTS
< from: Xray Chest 1 View AP/PA (01.30.20 @ 12:16) >    XAM:  XR CHEST AP OR PA 1V                            PROCEDURE DATE:  01/30/2020          INTERPRETATION:  Chest one view    HISTORY: Fever    COMPARISON STUDY: 1/16/2020    Frontal expiratory view of the chest shows the heart to be similar in size. The lungs show mild right base atelectasis and there is no evidence of pneumothorax nor pleural effusion.    IMPRESSION:  Mild right atelectasis.    Thank you for the courtesy of this referral.                MATT LLANOS M.D., ATTENDING RADIOLOGIST  This document has been electronically signed. Jan 30 2020 12:51PM          < end of copied text >
89 yo WF , [facility] resident with medical hx of  Anxiety disorder  ,Asthma  ,Constipation  ,Delusional disorder  ,Dementia  ,Hyperlipidemia    Hypertension. bib ems for fever. No further hx available due to dementia  .Urosepsis vs aspiration pneumonia suspected Admitted for septic workup and evaluation ,send blood and urine cx,serial lactate levels ,monitor vitals closely hydration ,monitor urine output and renal profile ,iv abx initiated. Pulmonology evaluation obtained Patient found to have lactate elevation and septic workup was sent Palliative care consult requested ,to discuss advance directives and complete MOLST
89 yo WF , [facility] resident with medical hx of  Anxiety disorder  ,Asthma  ,Constipation  ,Delusional disorder  ,Dementia  ,Hyperlipidemia    Hypertension. bib ems for fever. No further hx available due to dementia  .Urosepsis vs aspiration pneumonia suspected Admitted for septic workup and evaluation ,send blood and urine cx,serial lactate levels ,monitor vitals closely hydration ,monitor urine output and renal profile ,iv abx initiated. Pulmonology evaluation obtained Patient found to have lactate elevation and septic workup was sent Palliative care consult requested ,to discuss advance directives and complete MOLST
89 yo WF , [facility] resident with medical hx of  Anxiety disorder  ,Asthma  ,Constipation  ,Delusional disorder  ,Dementia  ,Hyperlipidemia    Hypertension. bib ems for fever. No further hx available due to dementia  .Urosepsis vs aspiration pneumonia suspected Admitted for septic workup and evaluation ,send blood and urine cx,serial lactate levels ,monitor vitals closely hydration ,monitor urine output and renal profile ,iv abx initiated. Pulmonology evaluation obtained Patient found to have lactate elevation and septic workup was sent Palliative care consult requested ,to discuss advance directives and complete MOLST
FE UPDATED AND SIGNED BY ME - Do not send to hospital unless pain or severe symptoms cannot be otherwise controlled  DNR Order; Comfort measures only; Do not re-hospitalize; No artificial nutrition; No antibiotics; No IV fluids  Health Care Proxy  Dr Olmos spoke with dtr and confirmed   she wishes for comfort measures only  will order CMO with Rx regimen  dc plan for [facility] with comfort only. CMO INITIATED .
FE UPDATED AND SIGNED BY ME - Do not send to hospital unless pain or severe symptoms cannot be otherwise controlled  DNR Order; Comfort measures only; Do not re-hospitalize; No artificial nutrition; No antibiotics; No IV fluids  Health Care Proxy  Dr Olmos spoke with dtr and confirmed   she wishes for comfort measures only  will order CMO with Rx regimen  dc plan for [facility] with comfort only. CMO INITIATED Hospice evaluation requested due to poor prognosis .Case discussed with palliative care representative,comfort care and palliative measures seem to be appropriate level of care at this point

## 2020-02-08 NOTE — PROGRESS NOTE ADULT - PROBLEM SELECTOR PLAN 1
ABX change noted - ID follow up reviewed  on INVANZ now -   on IVF  pt is DNR DNI - supportive medical regimen and care in progress -   long-term resident with medical hx of  Anxiety disorder  ,Asthma  ,Constipation  ,Delusional disorder  ,Dementia  ,Hyperlipidemia    Acute infection eval in progress -.
Anxiety disorder  ,Asthma  ,Constipation  ,Delusional disorder  ,Dementia  ,Hyperlipidemia, valvular heart disease, atelectasis  ID and Pulm and Medicine notes reviewed  GOC discussion - noted  pt is DNR DNI  assist with ADL  resides in [place]  oral and skin hygiene  Acute infection eval in progress  pt is on IV ABX -
CM notes reviewed - poss DC today  CMO Status  planned for DC to [facility] - with Hospice care and palliative measures  oral and skin hygiene  roxanol prn for sx management  bowel regimen  prognosis poor  advanced Dementia  DNR DNI  CM notes reviewed  approved by [facility].
CMO Status  planned for DC to [facility] - with Hospice care and palliative measures  oral and skin hygiene  roxanol prn for sx management  bowel regimen  prognosis poor  advanced Dementia  DNR DNI  CM notes reviewed  approved by [facility]
ID eval noted  remains on IV Rocephin  on IVF  pt is DNR DNI - supportive medical regimen and care in progress -   [facility] resident with medical hx of  Anxiety disorder  ,Asthma  ,Constipation  ,Delusional disorder  ,Dementia  ,Hyperlipidemia    Acute infection eval in progress -.
ID follow up reviewed  on INVANZ now -   on IVF  pt is DNR DNI - supportive medical regimen and care in progress -   senior living resident with medical hx of  Anxiety disorder  ,Asthma  ,Constipation  ,Delusional disorder  ,Dementia  ,Hyperlipidemia    Acute infection eval in progress -.
fever noted  ID eval noted  remains on IV Rocephin  on IVF  pt is DNR DNI - supportive medical regimen and care in progress -   long-term resident with medical hx of  Anxiety disorder  ,Asthma  ,Constipation  ,Delusional disorder  ,Dementia  ,Hyperlipidemia    Acute infection eval in progress -
on comfort measures  planned for [facility] with Hospice  roxanol PRN for sx management  bowel regimen  assist with ADL  DNR DNI  Vitals daily  supportive care and regimen  discussed plan of care with DTR and Nursing and PMD  prognosis POOR  will follow
overnight events noted  pt with high BP on daily vitals check  discussed with Nursing that elevated BP may be a sign of Pain and Discomfort - and hence she needs - Opioids for management of sx  end of life care  palliative measures  comfort measures status only  DNR DNI  [facility]
ACUTE RENAL FAILURE:   Serum creatinine is  at  1.2   , approximating GFR is decreased    ml/min.   There is no progression . No uremic symptoms  No evidence of anemia .  Fluid status stable.  Will continue to avoid nephrotoxic drugs.  Patient remains asymptomatic.   Continue current therapy.  hold  diuretic.  hold   ACE inhibitor.  hold   ARB.  Additional evaluation:   ECG,    echocardiogram,     CXR,  will obtained recent   renal ultrasound to evalaute kidney size and possible stones ,
ACUTE RENAL FAILURE:   Serum creatinine is  at  2.5   , approximating GFR is decreased    ml/min.   There is no progression . No uremic symptoms  No evidence of anemia .  Fluid status stable.  Will continue to avoid nephrotoxic drugs.  Patient remains asymptomatic.   Continue current therapy.  hold  diuretic.  hold   ACE inhibitor.  hold   ARB.  Additional evaluation:   ECG,    echocardiogram,     CXR,  will obtained recent   renal ultrasound to evalaute kidney size and possible stones ,
ACUTE RENAL FAILURE:   Serum creatinine is  at  2.5   , approximating GFR is decreased    ml/min.   There is no progression . No uremic symptoms  No evidence of anemia .  Fluid status stable.  Will continue to avoid nephrotoxic drugs.  Patient remains asymptomatic.   Continue current therapy.  hold  diuretic.  hold   ACE inhibitor.  hold   ARB.  Additional evaluation:   ECG,    echocardiogram,     CXR,  will obtained recent   renal ultrasound to evalaute kidney size and possible stones ,
RULED IN FOR SEPSIS 2/2 TO UTI WITH BACTEREMIA -SEEN BY ID
RULED IN FOR SEPSIS 2/2 TO UTI WITH BACTEREMIA -SEEN BY ID ,14 days of INVANZ recommended ,but daughter /HCP refused PICC placeemnt and iv abx She requests home hospice and [facility] care Palliative care consult requested ,to discuss advance directives and complete MOLST /initiate CMO
RULED IN FOR SEPSIS 2/2 TO UTI WITH BACTEREMIA -SEEN BY ID ,14 days of INVANZ recommended ,but daughter /HCP refused PICC placeemnt and iv abx She requests home hospice and [facility] care Palliative care consult requested ,to discuss advance directives and complete MOLST /initiate CMO
RULED IN FOR SEPSIS 2/2 TO UTI WITH BACTEREMIA -SEEN BY ID ,14 days of INVANZ recommended ,but daughter /HCP refused PICC placeemnt and iv abx She requests home hospice and [facility] care Palliative care consult requested ,to discuss advance directives and complete MOLST /initiate CMO
RULED IN FOR SEPSIS 2/2 TO UTI WITH BACTEREMIA -SEEN BY ID ,14 days of INVANZ recommended ,but daughter /HCP refused PICC placeemnt and iv abx She requests home hospice and [facility] care Palliative care consult requested ,to discuss advance directives and complete MOLST /initiate CMO
RULED IN FOR SEPSIS 2/2 TO UTI WITH BACTEREMIA -SEEN BY ID ,14 days of INVANZ recommended ,but daughter /HCP refused PICC placeemnt and iv abx She requests home hospice and hospitals care Palliative care consult requested ,to discuss advance directives and complete MOLST /initiate CMO
ACUTE RENAL FAILURE:   Serum creatinine is  at  2.5   , approximating GFR is decreased    ml/min.   There is no progression . No uremic symptoms  No evidence of anemia .  Fluid status stable.  Will continue to avoid nephrotoxic drugs.  Patient remains asymptomatic.   Continue current therapy.  hold  diuretic.  hold   ACE inhibitor.  hold   ARB.  Additional evaluation:   ECG,    echocardiogram,     CXR,  will obtained recent   renal ultrasound to evalaute kidney size and possible stones ,

## 2020-02-08 NOTE — PROGRESS NOTE ADULT - PROBLEM SELECTOR PROBLEM 5
Prophylactic measure
Asthma
Prophylactic measure
Asthma

## 2020-02-08 NOTE — PROGRESS NOTE ADULT - PROBLEM SELECTOR PLAN 5
dvt ppx   gi ppx
continue current management and present  medications ,seen by pulm consult ,O2 supply
dvt ppx   gi ppx
continue current management and present  medications ,seen by pulm consult ,O2 supply

## 2020-03-03 LAB
BASE EXCESS BLDA CALC-SCNC: 3.8 MMOL/L — HIGH (ref -2–2)
HCO3 BLDA-SCNC: 28 MMOL/L — HIGH (ref 23–27)
HOROWITZ INDEX BLDA+IHG-RTO: 30 — SIGNIFICANT CHANGE UP
PCO2 BLDA: 40 MMHG — SIGNIFICANT CHANGE UP (ref 32–46)
PH BLDA: 7.46 — HIGH (ref 7.35–7.45)
PO2 BLDA: 74 MMHG — SIGNIFICANT CHANGE UP (ref 74–108)
SAO2 % BLDA: 94 % — SIGNIFICANT CHANGE UP (ref 92–96)

## 2020-03-03 NOTE — ED PROVIDER NOTE - CROS ED CONS ALL NEG
Physical Therapy Treatment     ASSESSMENT:   Patient seen on 4 nursing unit.  Today's treatment focused on ambulation.  The patient is demonstrating good progress as evidenced by patients ability to transfer and ambulate x 100 feet with min assist.  At this time the patient continues to demonstrate impairments in activity tolerance, pain, ROM and strength which is limiting the performance of sit to/from stand transfers and ambulation .  Further skilled physical therapy services are reasonable and necessary to address the above impairments and performance deficits.            PLAN AND RECOMMENDATIONS:   Recommendations for Discharge:  Recommendation for Discharge: PT WI: Home, OP therapy      Plan:   Continue skilled PT, including the following Treatment/Interventions: Functional transfer training;Bed mobility;Gait training;Stairs retraining (03/03/20 1030)     PT Frequency: Twice a day (03/03/20 1545)    Treatment Plan for Next Session: Transfers, gait, ther ex                 EQUIPMENT:             DIAGNOSIS:   No diagnosis found.       PRECAUTIONS:   Precautions  Weight Bearing Status: Weight bearing as tolerated right lower extremity, Weight bearing as tolerated left lower extremity  Precautions Comments: Per post surgical guidelines       EDUCATION:        SUBJECTIVE:           OBJECTIVE:       Transfers:    Transfers  Sit to Stand: Minimal Assist (Min) (03/03/20 1545)  Stand to Sit: Minimal Assist (Min) (03/03/20 1545)  Stand Pivot Transfers: Minimal Assist (Min) (03/03/20 1545)  Assistive Device/: 2-wheeled walker;Gait Belt;1 Person (03/03/20 1545)      Gait:    Gait  Gait Assistance: Minimal Assist (Min) (03/03/20 1545)  Assistive Device/: 2-wheeled walker;Gait Belt;1 Person (03/03/20 1545)  Ambulation Distance (Feet): 100 Feet (03/03/20 1545)                  GOALS:   Short Term Goals to Be Reviewed On: 03/04/20 (03/03/20 1030)  Short Term Goals = Discharge Goals: Yes (03/03/20  1030)  Goal Agreement: Patient agrees with goals and treatment plan (03/03/20 1030)  Bed Mobility Discharge Goal: Supervision (03/03/20 1030)  Transfer Discharge Goal: Supervision (03/03/20 1030)  Ambulation Discharge Goal: Supervision (03/03/20 1030)  Stairs Discharge Goal: Supervision (03/03/20 1030)       BILLING INFORMATION:   Total Treatment Time: PT Time Spent: 15 minutes      - - -

## 2020-08-26 NOTE — ED ADULT TRIAGE NOTE - ESI TRIAGE ACUITY LEVEL, MLM
Samantha RN from [facility]    Requesting refill on:  1. amLODIPine (NORVASC) 10 MG tablet  2. atorvastatin (LIPITOR) 40 MG tablet    Send to:  [facility] [address] [phone] [facility] [phone] FX     Patient is out of both medications, she was not given refills when she was discharged from the hospital 8/13/20.    Call back number is:  [phone]     2

## 2020-11-24 NOTE — PHYSICAL THERAPY INITIAL EVALUATION ADULT - AMBULATION SKILLS, REHAB EVAL
Pt to ED via EMS with c/o fever and sob x 8 days.  Patient states he has been taking 1Gram Tylenol q4 hours for the fever, however, the fever has persisted.  Patient was found to be at 74 % spO2 on room air on EMS arrival, was placed on 15L NRB mask and improved to 94%.  Patient with respirations to 30-40s, febrile on arrival.  Patient placed on 6L nasal cannula per Dr. Pollack and has spO2 of 92-93%.     IV established, blood drawn, including 2 blood cultures and sent to lab. Cardiac monitor applied.   Will continue to monitor patient.    independent/rolling walker/needs device

## 2020-12-12 NOTE — ED ADULT TRIAGE NOTE - WEIGHT IN LBS
Attending Halie Barnes: Gen: NAD, heent: atrauamtic, eomi, perrla, mmm, op pink, uvula midline, neck; nttp, no nuchal rigidity, chest: nttp, no crepitus, cv: rrr, no murmurs, lungs: ctab, abd: soft, nontender, nondistended, no peritoneal signs, +BS, no guarding, ext: wwp, swelling and ecchymoes to left upper extremitiy. firmness to skin. ecchymoses to right inguinal area, skin: no rash, neuro: awake and alert, following commands, speech clear, sensation intact 160

## 2021-02-11 NOTE — PHYSICAL THERAPY INITIAL EVALUATION ADULT - BED MOBILITY TRAINING, PT EVAL
Pt will perform all bed mobility with min A x 1 in 2-3 sessions
Equal and normal pulses (carotid, femoral, dorsalis pedis)

## 2021-08-06 NOTE — ED ADULT TRIAGE NOTE - WEIGHT IN LBS
ANNUAL GYNECOLOGICAL EXAMINATION    Assessment/Plan:    Encounter for annual routine gynecological examination  Pap smear not indicated, repeat in 2 years with co testing  Has mirena in place, would like to remove it in one year to try for another child  No GYN complaints, other than ingrown hair due to hair removal/shaving  No family history of breast or GYN cancer      Diagnoses and all orders for this visit:    Encounter for annual routine gynecological examination    Ingrown hair  Comments:  removed from right labia - advised supportive management and prevention in future         Subjective:      Patient ID: Juilo Tan is a 27 y o  female  Julio Tan is a 27 y o  female who presents today for annual GYN exam   Pat Tello last pap smear was performed  and result was negative/normal   She reports no history of abnormal pap smears in her past  She contraceptive method is Mirena IUD placed 2019  Currently sexually active with male partner, lives with him and their child who just turned 3years old  She denies vaginal bleeding/discharge/odor  She denies burning/itching to the vagina  However reports pain in her vuvla that she suspects is an in grown hair  Does shave now  Educated on shaving with grain rather than against       She denies personal or family history of breast, ovarian, vaginal or cervical cancer  LMP: no menses with IUD    OB history:   Intimate partner violence (IPV): None  Exercise: no formal plan, however does occasionally  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  The following portions of the patient's history were reviewed and updated as appropriate: allergies, current medications, past family history, past medical history, past social history, past surgical history and problem list     Review of Systems   Constitutional: Negative for activity change, appetite change, chills, fatigue and fever  HENT: Negative for congestion, rhinorrhea and sore throat      Eyes: Negative for photophobia, pain and visual disturbance  Respiratory: Negative for cough, shortness of breath and wheezing  Cardiovascular: Negative for chest pain and palpitations  Gastrointestinal: Negative for abdominal pain, constipation, diarrhea, nausea and vomiting  Genitourinary: Negative for difficulty urinating, dysuria, frequency, genital sores (ingrown hair), hematuria, menstrual problem, pelvic pain, urgency, vaginal bleeding, vaginal discharge and vaginal pain  Musculoskeletal: Negative for arthralgias, back pain, myalgias and neck pain  Skin: Negative for rash  Allergic/Immunologic: Negative  Neurological: Negative for dizziness, weakness, light-headedness, numbness and headaches  Psychiatric/Behavioral: Negative for agitation and behavioral problems  Objective:    /75   Pulse 79   Ht 5' 8" (1 727 m)   Wt 83 5 kg (184 lb)   BMI 27 98 kg/m²      Physical Exam  Vitals and nursing note reviewed  Exam conducted with a chaperone present  Constitutional:       Appearance: She is well-developed  HENT:      Head: Normocephalic and atraumatic  Eyes:      Pupils: Pupils are equal, round, and reactive to light  Cardiovascular:      Rate and Rhythm: Normal rate and regular rhythm  Heart sounds: Normal heart sounds  No murmur heard  Pulmonary:      Effort: Pulmonary effort is normal  No respiratory distress  Breath sounds: Normal breath sounds  No wheezing  Chest:      Chest wall: No tenderness  Breasts: Breasts are symmetrical          Right: No swelling, bleeding, inverted nipple, mass, nipple discharge, skin change or tenderness  Left: No swelling, bleeding, inverted nipple, mass, nipple discharge, skin change or tenderness  Abdominal:      General: Bowel sounds are normal  There is no distension  Palpations: Abdomen is soft  Tenderness: There is no abdominal tenderness  Genitourinary:     Pubic Area: No rash         Labia: Right: No rash, tenderness, lesion or injury  Left: No rash, tenderness, lesion or injury  Urethra: No prolapse or urethral swelling  Vagina: Normal  No foreign body  No vaginal discharge, erythema, tenderness, bleeding, lesions or prolapsed vaginal walls  Cervix: No cervical motion tenderness, discharge, friability, lesion, erythema, cervical bleeding or eversion  Uterus: Normal        Adnexa: Right adnexa normal       Rectum: Normal       Comments: Ingrown hair on right labia  Mirena IUD string visualized  Musculoskeletal:         General: No tenderness  Normal range of motion  Cervical back: Normal range of motion and neck supple  Lymphadenopathy:      Upper Body:      Right upper body: No axillary or pectoral adenopathy  Left upper body: No axillary or pectoral adenopathy  Skin:     General: Skin is warm and dry  Capillary Refill: Capillary refill takes less than 2 seconds  Neurological:      Mental Status: She is alert and oriented to person, place, and time           Jovan Wang MD  08/06/21  11:48 AM 136.9

## 2021-10-04 NOTE — ED ADULT NURSE NOTE - NS PRO AD ANY ON CHART
Yes Fusiform Excision Additional Text (Leave Blank If You Do Not Want): The margin was drawn around the clinically apparent lesion.  A fusiform shape was then drawn on the skin incorporating the lesion and margins.  Incisions were then made along these lines to the appropriate tissue plane and the lesion was extirpated.

## 2021-10-05 NOTE — DISCHARGE NOTE ADULT - NS DC ANGIO PCI YN
PAST MEDICAL HISTORY:  CAD (coronary artery disease)     Complete heart block     HTN (hypertension)     Paroxysmal atrial fibrillation      no

## 2021-11-18 NOTE — PHYSICAL THERAPY INITIAL EVALUATION ADULT - BALANCE DISTURBANCE, IDENTIFIED IMPAIRMENT CONTRIBUTE, REHAB EVAL
[facility] INPATIENT ENCOUNTER   SURGERY DAILY PROGRESS NOTE    ADMISSION DATE:  11/15/2021  DATE:  11/18/2021  CURRENT HOSPITAL DAY:  Hospital Day: 4  SURGEON:  Jerry Gracia MD  ATTENDING PHYSICIAN:  Burak Angel MD      POSTOPERATIVE DAY:  55    INTERVAL HISTORY:    Duran Quinn is a 66 year old male patient postop day #55 status post ex lap, repair of perforated gastric ulcer, postoperative course complicated by multisystem organ failure and recurrent ventral leak. Underwent insertion of percutaneous drain in right upper quadrant with an air and fluid collection. Some bilious fluid was evacuated. Recurrent fever overnight however decreased severity. Blood pressure trending lower.     MEDICATIONS:    The medication list was reviewed today.       OBJECTIVE:    VITAL SIGNS:    Vital Last Value 24 Hour Range   Temperature (!) 101.7 °F (38.7 °C) Temp  Min: 99.4 °F (37.4 °C)  Max: 102 °F (38.9 °C)   Pulse 89 Pulse  Min: 81  Max: 101   Respiratory 13 Resp  Min: 13  Max: 31   Blood Pressure (!) 120/97 BP  Min: 85/53  Max: 139/112   Pulse Oximetry 100 % SpO2  Min: 94 %  Max: 100 %     INTAKE/OUTPUT:      Intake/Output Summary (Last 24 hours) at 11/18/2021 1224  Last data filed at 11/18/2021 1200  Gross per 24 hour   Intake 3611.31 ml   Output 1563 ml   Net 2048.31 ml         PHYSICAL EXAM:    Constitutional:  The patient is alert, oriented and cooperative.   Integument:  Warm.  Stable right lower abdominal rubor  Cardiovascular:  Normal heart rate. Normal rhythm.    Respiratory: Coarse breath sounds at bases  Abdominal:  Soft, non-distended, original right upper quadrant drain intact with light tan output, new right upper quadrant drain with purulent bilious output, left upper quadrant drain in place without output    LABORATORY DATA:    Lab Results   Component Value Date    SODIUM 145 11/18/2021    POTASSIUM 3.9 11/18/2021    CHLORIDE 114 (H) 11/18/2021    CO2 19 (L) 11/18/2021    BUN 36 (H) 11/18/2021    CREATININE  1.17 11/18/2021    GLUCOSE 125 (H) 11/18/2021     Lab Results   Component Value Date    WBC 4.9 11/18/2021    HCT 24.7 (L) 11/18/2021    HGB 7.1 (L) 11/18/2021     11/18/2021     Lab Results   Component Value Date    AST 99 (H) 11/16/2021    GPT 30 11/16/2021    ALKPT 166 (H) 11/16/2021    BILIRUBIN 0.9 11/16/2021        IMAGING STUDIES:    Imaging studies reviewed.      CT GUIDED ASPIRATION   Final Result   Technically successful image guided drain placement in right   abdomen.       PLAN: Closed suction drainage.   ___________________________________________________________________________   _____________________________________      PROCEDURES PERFORMED:   Image guided needle placement   Percutaneous drain placement         ANESTHESIA/SEDATION: Moderate conscious sedation was not administered.    PROPHYLACTIC ANTIBIOTIC: Prophylactic antibiotics were administered within   1 hour the procedure start time.      PREPARATION: The site was prepared and draped and all elements of maximal   sterile barrier technique including sterile gloves, sterile gown, mask,   large sterile sheet, hand hygiene and cutaneous antisepsis with 2%   chlorhexidine +70% isopropyl alcohol were used. Discussion of Risks,   Benefits and Alternatives completed with patient/authorized decision maker.   Additionally, potential problems related to recuperation, likelihood of   achieving care, treatment and service goals were discussed. Relevant risks,   benefits and side effects related to alternatives, including the possible   results of not receiving care, treatment and services discussed. When   indicated, any limitations on the confidentiality of information learned   from or about the patient were explained. All patient/authorized decision   maker questions answered and consent for procedure was provided. The   patient's identification, correct procedure and position were verified. The   appropriate site was verified and marked as  appropriate. Equipment/Implants   were checked for functionality and availability.      PROCEDURE:   IMAGE GUIDED ACCESS: The skin overlying the fluid collection was sterilely   prepped and draped.  Local anesthesia was administered.  The targeted   collection was then accessed with a needle using imaging guidance which   included computed tomography.          Access Technique: 18 gauge needle        Access location: Right mid abdomen      PERCUTANEOUS DRAIN PLACEMENT: A guidewire was advanced and coiled within   the collection before dilating the tract using Seldinger technique.  A   drain was then advanced over the guidewire, formed in the collection, and   secured in place with Percu-Stay. The catheter was connected to closed   suction drainage.  A sterile dressing was applied        Catheter: 10 [place] APD        Samples sent to the lab: Yes        Additional description of procedure: None      FINDINGS:   Images from the procedure revealed a collection that was composed of air   and fluid.   The fluid appeared purulent.  Approximately 10 cc was drained   at the time of the procedure.      COMPLICATIONS: None      SPECIMENS REMOVED: cultures      IMPLANTS: None      ESTIMATED BLOOD LOSS: Minimal      RADIATION/CONTRAST DOSE:   DLP: 3589 mGy-cm   CONTRAST DOSE: 0 cc      Electronically Signed by: GETACHEW MCGINNIS M.D.    Signed on: 11/17/2021 6:23 PM          CT ABDOMEN PELVIS WO CONTRAST   Final Result   Postsurgical changes in the upper abdomen.  Extraluminal air   collections in the subhepatic region anterior to the gastric antrum.  The   air collection measures maximum of 6.5 x 3.9 cm in diameter.  Minimal   amount of fluid around the drainage catheter to the right of midline.  No   organized abscess is seen at this time.  There is no bowel obstruction.    There is no evidence of extraluminal oral contrast.  Severe edema and skin   thickening is seen in the anterior abdominal wall.  No subcutaneous abscess    is noted.      Electronically Signed by: TODD CADET MD    Signed on: 11/17/2021 10:10 AM                                       ASSESSMENT/PLAN:    Postop day 55 status post repair of perforated gastric ulcer complicated by multisystem organ failure and recurrent endoleak and postoperative period. Patient to likely undergo endoscopic evaluation today to assess location and appearance of gastric or duodenal enterocutaneous fistulas. Continue antibiotics per infectious disease.  Continue ventilator management per intensivist.  Repeat labs in the morning.  Continue ICU care.        Electronically signed by: Jerry Gracia MD  11/18/2021       Given lower BP and ongoing fevers and the likelihood that he may become more ill immediately following any endoscopic evaluation, after discussion with Dr. Diaz feel that it would be prudent to allow Duran to further recover from his drain insertion yesterday.  Assuming ongoing improvement over the next few days will likely consider endoscopic evaluation early next week.  I have discussed this with his family and they have expressed their understanding and are agreeable.    Electronically signed by: Jerry Gracia MD  11/18/2021     decreased strength

## 2022-06-23 NOTE — ED PROVIDER NOTE - NS ED ATTENDING STATEMENT MOD
Assessment  32y  at 39w4d presents for PROM induction.     Plan  1. Admit to L+D. Routine Labs. IVF.  2. Expectant management, VE in 2 hrs.  3. Fetus: cat 1 tracing. VTX. EFW 3296g by sono. Continuous EFM. Sono. No concerns.  4. Prenatal issues: none  5. GBS (+), for ampicillin  6. Pain: IV pain meds/epidural PRN    Plan per attending physician, Dr. Suzette Townsend MD  PGY1 Attending Only

## 2022-07-14 NOTE — ED ADULT TRIAGE NOTE - ADDITIONAL SAFETY/BANDS...
Notification of Discharge   This is a Notification of Discharge from our facility [address]  Please be advised that this patient has been discharge from our facility  Below you will find the admission and discharge date and time including the patients disposition  UTILIZATION REVIEW CONTACT:  Madhavi Richards  Utilization   Network Utilization Review Department  Phone: [phone] x carefully listen to the prompts  All voicemails are confidential   Email: [email or web address]     PHYSICIAN ADVISORY SERVICES:  FOR [id number] REVIEW - MEDICAL NECESSITY DENIAL  Phone: [phone]  Fax: [phone]  Email: [email or web address]     PRESENTATION DATE: 7/6/2022  9:48 PM  OBERVATION ADMISSION DATE:   INPATIENT ADMISSION DATE: 7/7/22  1:23 AM   DISCHARGE DATE: 7/13/2022  5:27 PM  DISPOSITION: Released to SNF/TCU/SNU Facility Released to SNF/TCU/SNU Facility      IMPORTANT INFORMATION:  Send all requests for admission clinical reviews, approved or denied determinations and any other requests to dedicated fax number below belonging to the campus where the patient is receiving treatment   List of dedicated fax numbers:  [address] [place] [address] DENIALS (Administrative/Medical Necessity) [phone]   [address]  (Maternity/NICU/Pediatrics) [phone]   Tasha Handing [phone]   [address] [facility] [phone]   [address] [place] [address] [phone]   [address] [place] [address],[address] Floor [address] [phone]   [facility]  [phone]   [address] [place] [address], [facility]  [address] [facility] [address] [facility] [phone] Additional Safety/Bands:

## 2023-01-01 NOTE — ED ADULT TRIAGE NOTE - ESI TRIAGE ACUITY LEVEL, MLM
Problem: Infant Inpatient Plan of Care  Goal: Plan of Care Review  Outcome: Ongoing, Progressing  Flowsheets (Taken 2023 1817)  Progress: improving  Outcome Evaluation: Infant doing well. vss. voiding and stooling. working on PO feedings. MOB and FOB and grandparents have visited with infant in nicu frequently.  Care Plan Reviewed With:   mother   father  Goal: Patient-Specific Goal (Individualized)  Outcome: Ongoing, Progressing  Goal: Absence of Hospital-Acquired Illness or Injury  Outcome: Ongoing, Progressing  Intervention: Identify and Manage Fall/Drop Risk  Recent Flowsheet Documentation  Taken 2023 1500 by Selena Winkler RN  Safety Factors:   baby under radiant warmer, side rails up   bag and mask readily available   bulb syringe readily available   ID bands on   ID verified   oxygen readily available   suction readily available  Taken 2023 0900 by Selena Winkler RN  Safety Factors:   baby under radiant warmer, side rails up   bag and mask readily available   bulb syringe readily available   ID bands on   ID verified   oxygen readily available   suction readily available  Intervention: Prevent Skin Injury  Recent Flowsheet Documentation  Taken 2023 0900 by Selena Winkler RN  Skin Protection (Infant):   electrode site changed   pulse oximeter probe site changed  Intervention: Prevent Infection  Recent Flowsheet Documentation  Taken 2023 1500 by Selena Winkler RN  Infection Prevention:   hand hygiene promoted   personal protective equipment utilized   rest/sleep promoted   visitors restricted/screened  Taken 2023 0900 by Selena Winkler RN  Infection Prevention:   hand hygiene promoted   personal protective equipment utilized   rest/sleep promoted   visitors restricted/screened  Goal: Optimal Comfort and Wellbeing  Outcome: Ongoing, Progressing  Goal: Readiness for Transition of Care  Outcome: Ongoing, Progressing     Problem: Fall Injury Risk  Goal: Absence of Fall and  Fall-Related Injury  Outcome: Ongoing, Progressing     Problem: Circumcision Care ([place])  Goal: Optimal Circumcision Site Healing  Outcome: Ongoing, Progressing     Problem: Hypoglycemia ([place])  Goal: Glucose Stability  Outcome: Ongoing, Progressing     Problem: Infection ([place])  Goal: Absence of Infection Signs and Symptoms  Outcome: Ongoing, Progressing  Intervention: Prevent or Manage Infection  Recent Flowsheet Documentation  Taken 2023 1500 by Selena Winkler RN  Infection Management: aseptic technique maintained  Taken 2023 0900 by Selena Winkler RN  Infection Management: aseptic technique maintained     Problem: Oral Nutrition ([place])  Goal: Effective Oral Intake  Outcome: Ongoing, Progressing  Intervention: Promote Effective Oral Intake  Recent Flowsheet Documentation  Taken 2023 1500 by Selena Winkler RN  Feeding Interventions:   chin supported   feeding cues monitored   feeding paced   latch assistance provided   lips stroked   reflux precautions used   sucking promoted  Taken 2023 0900 by Selena Winkler RN  Feeding Interventions:   chin supported   feeding cues monitored   feeding paced   latch assistance provided   lips stroked   reflux precautions used   sucking promoted     Problem: Infant-Parent Attachment ([place])  Goal: Demonstration of Attachment Behaviors  Outcome: Ongoing, Progressing  Intervention: Promote Infant-Parent Attachment  Recent Flowsheet Documentation  Taken 2023 1500 by Selena Winkler RN  Sleep/Rest Enhancement (Infant):   awakenings minimized   sleep/rest pattern promoted   swaddling promoted  Taken 2023 0900 by Selena Winkler RN  Parent/Child Attachment Promotion:   caring behavior modeled   parent/caregiver presence encouraged   interaction encouraged   participation in care promoted   positive reinforcement provided   strengths emphasized  Sleep/Rest Enhancement (Infant):   awakenings minimized   sleep/rest pattern promoted   swaddling  3 promoted     Problem: Pain ([place])  Goal: Acceptable Level of Comfort and Activity  Outcome: Ongoing, Progressing     Problem: Respiratory Compromise ()  Goal: Effective Oxygenation and Ventilation  Outcome: Ongoing, Progressing     Problem: Skin Injury ([place])  Goal: Skin Health and Integrity  Outcome: Ongoing, Progressing  Intervention: Provide Skin Care and Monitor for Injury  Recent Flowsheet Documentation  Taken 2023 0900 by Selena Winkler RN  Skin Protection (Infant):   electrode site changed   pulse oximeter probe site changed     Problem: Temperature Instability ([place])  Goal: Temperature Stability  Outcome: Ongoing, Progressing  Intervention: Promote Temperature Stability  Recent Flowsheet Documentation  Taken 2023 1500 by Selena Winkler RN  Warming Method:   t-shirt   swaddled  Taken 2023 0900 by Selena Winkler RN  Warming Method:   t-shirt   swaddled   Goal Outcome Evaluation:           Progress: improving  Outcome Evaluation: Infant doing well. vss. voiding and stooling. working on PO feedings. MOB and FOB and grandparents have visited with infant in nicu frequently.

## 2023-02-27 NOTE — ED ADULT NURSE NOTE - CHPI ED SYMPTOMS POS
----- Message from JoseluisPolimaxdanae Cortez sent at 2/26/2023  2:30 PM EST -----  Regarding: Markie Mcdaniel,  I am wrapping up the 10 day trial of the Dexcom. You told me to contact you if I was ready for you to call in the prescription. I would like to continue with the Dexcom G6 please to improve my blood sugar numbers. My pharmacy is [facility] on [address]. Let me know if you need anything more from me. Thank you for the opportunity to trial the Dexcom.   Ranjith Patterson WEAKNESS

## 2023-04-14 NOTE — ED ADULT NURSE NOTE - NS ED NURSE DC INFO COMPLEXITY
Medication:    Outpatient Medications Marked as Taking for the 4/14/23 encounter (Refill) with Jose Estevez MD   Medication Sig Dispense Refill   • lisdexamfetamine (Vyvanse) 60 MG capsule Take 1 capsule by mouth every morning. 30 capsule 0   • guanFACINE (INTUNIV) 1 MG TABLET SR 24 HR GIVE \"JAVONI\" 1 TABLET BY MOUTH DAILY 30 tablet 2       Message to Prescriber: n/a    [x] Pharmacy has been verified.    [] Patient completely out of medication (*Route encounter as high priority if checked)    [x] Patient informed refill request can take up to 5 business days to be processed    Patient currently has follow up appointment scheduled      
Straightforward: Basic instructions, no meds, no home treatment

## 2023-09-14 NOTE — ED PROVIDER NOTE - DATE/TIME 2
Patient lives in apartment, no steps to enter, no steps inside. Prior to this admission, Pt was in [facility], reports she was ambulating with a RW and was starting to demonstrate less assistance with ADLs.
11-Aug-2018 00:34

## 2023-10-05 NOTE — ED PROVIDER NOTE - EKG ADDITIONAL QUESTION - PERFORMED INDEPENDENT VISUALIZATION
Patient called back and stated she gave the wrong fax number. The new number is [phone].    Yes

## 2023-10-13 NOTE — ED ADULT TRIAGE NOTE - HEART RATE (BEATS/MIN)
-- DO NOT REPLY / DO NOT REPLY ALL --  -- Message is from Engagement Center Operations (ECO) --    General Patient Message: Patient states had applied for some assistance for prescription [employer] program will be sending over a fax, verify diagnoses  please watch for it, if any questions please call back       Alternative phone number: NO    Can a detailed message be left? Yes    Message Turnaround:     Is it Working Hours? Yes - Working Hours     [place]:    Please give this turnaround time to the caller:   \"This message will be sent to [state Provider's name]. The clinical team will fulfill your request as soon as they review your message.\"                
Duplicate  task.   
69

## 2024-04-23 NOTE — ED ADULT NURSE NOTE - PAIN: PRESENCE, MLM
PRIMARY DIAGNOSIS: CONGESTIVE HEART FAILURE  OUTPATIENT/OBSERVATION GOALS TO BE MET BEFORE DISCHARGE:  Dyspnea improved and O2 sats >88% at RA or at prior home O2 therapy level:     Pt on 1L NC        SpO2: 96 %, O2 Device: Nasal cannula  Vitals:    04/21/24 2124 04/22/24 1600   Weight: 83.9 kg (185 lb) 85.6 kg (188 lb 11.2 oz)        ECHO and other diagnostic testing complete (if applicable): Yes    Return to near baseline physical activity: No    Discharge Planner Nurse   Safe discharge environment identified: No  Barriers to discharge: Yes PT/OT/CM evaluations, IV lasix, and overnight sleep evaluation pending       Entered by: Elicia Hall RN 04/22/2024 7:27 PM     Please review provider order for any additional goals.   Nurse to notify provider when observation goals have been met and patient is ready for discharge.   denies pain/discomfort

## 2024-06-07 NOTE — GOALS OF CARE CONVERSATION - ADVANCED CARE PLANNING - PHONE #
1030 Greeted pt in CT scanning for CTA coronary test. Pt states she has been seen by Dr Jarrell for abnormal stress test which prompted this testing today.      1034 IV started per Magaly,CT tech.      1038 Pt to CT table. Vitals as charted. HR 51.      1040 NTG SL 0.4 given prior to CT scan.      1047 Scan complete. Pt tolerated well.      1048 Post procedure vitals taken. INT discontinued; hemostasis achieved.      1051 Pt sitting at bedside of CT table. Pt denies shortness of breath, dizziness, lightheadedness.      1054 Pt ambulated to main entrance for discharge.         
[phone]

## 2024-11-04 NOTE — PROGRESS NOTE ADULT - PROBLEM SELECTOR PROBLEM 10
Prophylactic measure
Stable

## 2025-01-15 NOTE — ED PROVIDER NOTE - NS ED MD TWO NIGHTS YN
Left a message for the patient to use Immodium OTC and let us know if symptoms persist next week so he can be seen.  
Pt called stating that he has been having stomach issues for the past couple weeks no appetite and the past couple days has had diarrhea no vomiting or fever. Would like to know what he should do he is concerned please advise.   
Yes

## 2025-02-03 NOTE — ED PROVIDER NOTE - GASTROINTESTINAL, MLM
4 Eyes Skin Assessment     NAME:  Anjelica Blanchard  YOB: 1978  MEDICAL RECORD NUMBER:  [id number]    The patient is being assessed for  Admission    I agree that at least one RN has performed a thorough Head to Toe Skin Assessment on the patient. ALL assessment sites listed below have been assessed.      Areas assessed by both nurses:    Head, Face, Ears, Shoulders, Back, Chest, Arms, Elbows, Hands, Sacrum. Buttock, Coccyx, Ischium, Legs. Feet and Heels, and Under Medical Devices   Puppy scratches to hand that are dry and healing.         Does the Patient have a Wound? No noted wound(s)       Serge Prevention initiated by RN: No  Wound Care Orders initiated by RN: No    Pressure Injury (Stage 3,4, Unstageable, DTI, NWPT, and Complex wounds) if present, place Wound referral order by RN under : No    New Ostomies, if present place, Ostomy referral order under : No     Nurse 1 eSignature: Electronically signed by Marlon Black RN on 2/3/25 at 5:05 AM EST    **SHARE this note so that the co-signing nurse can place an eSignature**    Nurse 2 eSignature: Electronically signed by Anand Mcdaniel RN on 2/3/25 at 5:08 AM EST    Abdomen soft, non-tender, no guarding.

## 2025-03-21 NOTE — ED ADULT TRIAGE NOTE - AS TEMP SITE
oral - Likely DENISHA in setting of cardiorenal syndrome  - Continue monitoring Cr and urine output. Nephrology following, appreciate recs.    #Leukocytosis- rising white count over the past couple days; also with abd distension and pain. Ddx includes severe constipation given suboptimal BM vs. less likely SBP from cardiac ascites vs. possible UTI  - UA positive, s/p meropenem, now white count climbing again- consider resuming empirically  - pt with large bm x 2, now constipated again; cont aggressive bowel regimen with suppositories/enema as needed  - f/u dx/tx para given large ascites on imaging, neg for SBP  - cont trending cbc

## 2025-05-30 NOTE — ED ADULT NURSE NOTE - GENITOURINARY ASSESSMENT
[FreeTextEntry1] : 85 year old with recurrent UTIs, OAB, small renal mass on iamging, incidental R UPJO.  1) Recurrent UTIs: Recurrent UTI is defined as two episodes of acute bacterial cystitis within six months or three episodes within one year. Patient meets criteria. Episodes are classified as uncomplicated given absence of known anatomic or functional abnormality of the urinary tract, immunocompromised host,  infection with multi-drug resistant bacteria, or recurrence within two weeks of initial treatment. We discussed the natural history of UTIs and strategies to prevent incidence of UTIs. We discussed the data related to increase water intake and cranberry extract daily. We discussed antibiotic prophylaxis both continuous and post-coital. Plan for cranberry and probiotics. Started estrace cream but felt nipple sensitivity so d/rene.  2) OAB: OAB is a clinical diagnosis characterized by the presence of bothersome urinary symptoms. It is a symptom complex with a variable and chronic course that needs to be managed over time, that it primarily affects QOL, that there is no single ideal treatment and that available treatments vary in the effort required from the patient as well as in invasiveness, risk of adverse events and reversibility. First line treatment is behavioral modifications: bladder training with pelvic floor physical therapy, fluid management, caffeine reduction, dietary changes (avoiding bladder irritants). Next line oral anti-muscarinics or oral 3-adrenoceptor agonists. Third line PTNS and neuromodulation. Fourth line more invasive surgical treatment. Not interested in PFPT. Plan for mirabegron given vibegron not covered. Was not covered either but will send to compounding pharmacy. The side effects of Mirabegron were discussed in detail. These include but are not limited to allergic reaction (hives, problems breathing), elevation of blood pressure, fast heartbeat, difficulty urinating, headache, dry eyes, dry mouth, constipation, and nasal congestion.   3) Renal mass - 2023: Right posterior interpolar 9 mm partially exophytic lesion does not meet the criteria for a simple cyst.  may represent a hemorrhagic/proteinaceous cyst or a neoplasm. CTU 5/2025: Stable 6 x 7 mm enhancing cortical lesion in the posterior aspect mid pole right kidney with two-year stability. We reviewed the possible underlying histology of solid enhancing renal masses, with the majority being malignant (~80%) whereas ~20% are benign (e.g. oncocytoma).  We discussed the role/possibility of percutaneous biopsy, with the associated risks, benefits, complications, and accuracy issues (e.g. risk of false negative results).The heterogeneous natural history/biology of renal cell carcinoma was discussed, including the fact that while many renal cancers are indolent, others behave aggressively. Options were reviewed including, not limited to, active surveillance (AS), surgical extirpation and ablation. The risks of tumor growth and metastasis on active surveillance were reviewed, including the fact that metastatic progression on AS could mean missing the opportunity for cure.  The average growth rate of ~2-3 mm/year and metastasis rates of ~2-3% on AS over 5 year interval for small renal masses <4 cm was discussed. Given age, size, stability will continue to monitor, plan for imaging 1 year.  4) UPJO incidental on imaging - Cr wnl. No CVA tenderness, does report back pain but due to spinal stenosis. Never had complicated UTI and unclear if related. CTU 5/2025 Bilateral dilated extrarenal pelvis. Fullness is demonstrated in the collecting system right kidney without change and no evidence of hydronephrosis left kidney. Images reviewed - suspect incidental finding. Options discussed with patient including further work-up with Mag3 vs observation. Given stability fo finding, no cindy hydro, normal renal function, no complicated UTIs will observe for now with observation of renal lesion.  Plan: - stop estrogen cream - continue probiotics and cranberry pills - start mirabegron - sent to compounding pharmacy - imaging one year for renal imaging - PCP re incidental findings: seeing Dr. Montesinos for hernia and PCP managing pulm nodule - fu 6 weeks impact mirabegron  I am the primary provider managing this condition and will be seeing the patient longitudinally. 
[FreeTextEntry1] : 85 year old with recurrent UTIs, OAB, small renal mass on iamging, incidental R UPJO.  1) Recurrent UTIs: Recurrent UTI is defined as two episodes of acute bacterial cystitis within six months or three episodes within one year. Patient meets criteria. Episodes are classified as uncomplicated given absence of known anatomic or functional abnormality of the urinary tract, immunocompromised host,  infection with multi-drug resistant bacteria, or recurrence within two weeks of initial treatment. We discussed the natural history of UTIs and strategies to prevent incidence of UTIs. We discussed the data related to increase water intake and cranberry extract daily. We discussed antibiotic prophylaxis both continuous and post-coital. Plan for cranberry and probiotics. Started estrace cream but felt nipple sensitivity so d/rene.  2) OAB: OAB is a clinical diagnosis characterized by the presence of bothersome urinary symptoms. It is a symptom complex with a variable and chronic course that needs to be managed over time, that it primarily affects QOL, that there is no single ideal treatment and that available treatments vary in the effort required from the patient as well as in invasiveness, risk of adverse events and reversibility. First line treatment is behavioral modifications: bladder training with pelvic floor physical therapy, fluid management, caffeine reduction, dietary changes (avoiding bladder irritants). Next line oral anti-muscarinics or oral 3-adrenoceptor agonists. Third line PTNS and neuromodulation. Fourth line more invasive surgical treatment. Not interested in PFPT. Plan for mirabegron given vibegron not covered. Was not covered either but will send to compounding pharmacy. The side effects of Mirabegron were discussed in detail. These include but are not limited to allergic reaction (hives, problems breathing), elevation of blood pressure, fast heartbeat, difficulty urinating, headache, dry eyes, dry mouth, constipation, and nasal congestion.   3) Renal mass - 2023: Right posterior interpolar 9 mm partially exophytic lesion does not meet the criteria for a simple cyst.  may represent a hemorrhagic/proteinaceous cyst or a neoplasm. CTU 5/2025: Stable 6 x 7 mm enhancing cortical lesion in the posterior aspect mid pole right kidney with two-year stability. We reviewed the possible underlying histology of solid enhancing renal masses, with the majority being malignant (~80%) whereas ~20% are benign (e.g. oncocytoma).  We discussed the role/possibility of percutaneous biopsy, with the associated risks, benefits, complications, and accuracy issues (e.g. risk of false negative results).The heterogeneous natural history/biology of renal cell carcinoma was discussed, including the fact that while many renal cancers are indolent, others behave aggressively. Options were reviewed including, not limited to, active surveillance (AS), surgical extirpation and ablation. The risks of tumor growth and metastasis on active surveillance were reviewed, including the fact that metastatic progression on AS could mean missing the opportunity for cure.  The average growth rate of ~2-3 mm/year and metastasis rates of ~2-3% on AS over 5 year interval for small renal masses <4 cm was discussed. Given age, size, stability will continue to monitor, plan for imaging 1 year.  4) UPJO incidental on imaging - Cr wnl. No CVA tenderness, does report back pain but due to spinal stenosis. Never had complicated UTI and unclear if related. CTU 5/2025 Bilateral dilated extrarenal pelvis. Fullness is demonstrated in the collecting system right kidney without change and no evidence of hydronephrosis left kidney. Images reviewed - suspect incidental finding. Options discussed with patient including further work-up with Mag3 vs observation. Given stability fo finding, no cindy hydro, normal renal function, no complicated UTIs will observe for now with observation of renal lesion.  Plan: - stop estrogen cream - continue probiotics and cranberry pills - start mirabegron - sent to compounding pharmacy - imaging one year for renal imaging - PCP re incidental findings: seeing Dr. Montesinos for hernia and PCP managing pulm nodule - fu 6 weeks impact mirabegron  I am the primary provider managing this condition and will be seeing the patient longitudinally. 
WDL

## 2025-07-14 NOTE — ED ADULT NURSE NOTE - CAS TRG GEN SKIN COLOR
Depression Screen  More Data Synopsis  PHQ2/9 Numeric Score  More data exists         7/14/2025 7/3/2024   PHQ 2/9 Numeric Score   Adult PHQ 2 Score 0 0   Adult PHQ 2 Interpretation No further screening needed No further screening needed     DEPRESSION ASSESSMENT/PLAN:  Depression screening is negative no further plan needed.    Normal for race